# Patient Record
Sex: FEMALE | Race: WHITE | Employment: OTHER | URBAN - METROPOLITAN AREA
[De-identification: names, ages, dates, MRNs, and addresses within clinical notes are randomized per-mention and may not be internally consistent; named-entity substitution may affect disease eponyms.]

---

## 2017-01-17 ENCOUNTER — HOSPITAL ENCOUNTER (OUTPATIENT)
Dept: NON INVASIVE DIAGNOSTICS | Age: 77
Discharge: HOME | End: 2017-01-17
Attending: INTERNAL MEDICINE
Payer: MEDICARE

## 2017-01-17 DIAGNOSIS — C50.111 MALIGNANT NEOPLASM OF CENTRAL PORTION OF RIGHT FEMALE BREAST (HCC): ICD-10-CM

## 2017-01-17 PROCEDURE — 93306 TTE W/DOPPLER COMPLETE: CPT

## 2017-05-09 ENCOUNTER — HOSPITAL ENCOUNTER (OUTPATIENT)
Dept: NON INVASIVE DIAGNOSTICS | Age: 77
Discharge: HOME | End: 2017-05-09
Attending: INTERNAL MEDICINE
Payer: MEDICARE

## 2017-05-09 DIAGNOSIS — Z79.899 ENCOUNTER FOR LONG-TERM (CURRENT) USE OF OTHER MEDICATIONS: ICD-10-CM

## 2017-05-09 PROCEDURE — 93306 TTE W/DOPPLER COMPLETE: CPT

## 2017-08-22 ENCOUNTER — HOSPITAL ENCOUNTER (OUTPATIENT)
Dept: NON INVASIVE DIAGNOSTICS | Age: 77
Discharge: HOME | End: 2017-08-22
Attending: INTERNAL MEDICINE
Payer: MEDICARE

## 2017-08-22 DIAGNOSIS — Z79.899 ENCOUNTER FOR LONG-TERM (CURRENT) USE OF OTHER MEDICATIONS: ICD-10-CM

## 2017-08-22 PROCEDURE — 93306 TTE W/DOPPLER COMPLETE: CPT

## 2017-08-26 PROBLEM — Z78.0 POST-MENOPAUSAL: Status: ACTIVE | Noted: 2017-08-26

## 2017-08-26 PROBLEM — K11.20 SIALOADENITIS: Status: ACTIVE | Noted: 2017-08-26

## 2017-08-26 PROBLEM — K26.9 DUODENAL ULCER: Status: ACTIVE | Noted: 2017-08-26

## 2017-08-26 PROBLEM — N64.53 NIPPLE RETRACTION: Status: ACTIVE | Noted: 2017-08-26

## 2017-08-26 PROBLEM — R53.83 FATIGUE: Status: ACTIVE | Noted: 2017-08-26

## 2017-08-26 PROBLEM — E78.5 HYPERLIPIDEMIA, ACQUIRED: Status: ACTIVE | Noted: 2017-08-26

## 2017-08-26 PROBLEM — D64.9 ANEMIA: Status: ACTIVE | Noted: 2017-08-26

## 2017-08-26 PROBLEM — L03.90 CELLULITIS: Status: ACTIVE | Noted: 2017-08-26

## 2017-08-26 PROBLEM — Z96.659 HISTORY OF TOTAL KNEE REPLACEMENT: Status: ACTIVE | Noted: 2017-08-26

## 2017-08-26 PROBLEM — Z86.018 HISTORY OF UTERINE FIBROID: Status: ACTIVE | Noted: 2017-08-26

## 2017-08-26 PROBLEM — M79.2 NEURALGIA: Status: ACTIVE | Noted: 2017-08-26

## 2017-08-26 PROBLEM — C50.911 BREAST CANCER, RIGHT (HCC): Status: ACTIVE | Noted: 2017-08-26

## 2017-09-22 PROBLEM — I10 HTN (HYPERTENSION), BENIGN: Status: ACTIVE | Noted: 2017-09-22

## 2017-09-28 ENCOUNTER — OFFICE VISIT (OUTPATIENT)
Dept: INTERNAL MEDICINE CLINIC | Age: 77
End: 2017-09-28

## 2017-09-28 VITALS
HEIGHT: 64 IN | BODY MASS INDEX: 32.61 KG/M2 | SYSTOLIC BLOOD PRESSURE: 140 MMHG | WEIGHT: 191 LBS | DIASTOLIC BLOOD PRESSURE: 78 MMHG

## 2017-09-28 DIAGNOSIS — Z23 ENCOUNTER FOR IMMUNIZATION: ICD-10-CM

## 2017-09-28 DIAGNOSIS — Z00.00 MEDICARE ANNUAL WELLNESS VISIT, SUBSEQUENT: Primary | ICD-10-CM

## 2017-09-28 DIAGNOSIS — Z13.31 SCREENING FOR DEPRESSION: ICD-10-CM

## 2017-09-28 DIAGNOSIS — I10 ESSENTIAL HYPERTENSION: ICD-10-CM

## 2017-09-28 DIAGNOSIS — C78.00 CARCINOMA OF RIGHT BREAST METASTATIC TO LUNG (HCC): ICD-10-CM

## 2017-09-28 DIAGNOSIS — C50.911 CARCINOMA OF RIGHT BREAST METASTATIC TO LUNG (HCC): ICD-10-CM

## 2017-09-28 DIAGNOSIS — Z13.6 SCREENING FOR ISCHEMIC HEART DISEASE: ICD-10-CM

## 2017-09-28 LAB
ALBUMIN SERPL-MCNC: 4.1 G/DL (ref 3.9–5.4)
ALKALINE PHOS POC: 108 U/L (ref 38–126)
ALT SERPL-CCNC: 32 U/L (ref 9–52)
AST SERPL-CCNC: 27 U/L (ref 14–36)
BACTERIA UA POCT, BACTPOCT: NORMAL
BILIRUB UR QL STRIP: NEGATIVE
BUN BLD-MCNC: 17 MG/DL (ref 7–17)
CALCIUM BLD-MCNC: 9.4 MG/DL (ref 8.4–10.2)
CASTS UA POCT: 0
CHLORIDE BLD-SCNC: 104 MMOL/L (ref 98–107)
CHOLEST SERPL-MCNC: 207 MG/DL (ref 0–200)
CLUE CELLS, CLUEPOCT: NEGATIVE
CO2 POC: 29 MMOL/L (ref 22–32)
CREAT BLD-MCNC: 0.8 MG/DL (ref 0.7–1.2)
CRYSTALS UA POCT, CRYSPOCT: NEGATIVE
EGFR (POC): 71.6
EPITHELIAL CELLS POCT: NORMAL
GLUCOSE POC: 102 MG/DL (ref 65–105)
GLUCOSE UR-MCNC: NEGATIVE MG/DL
HDLC SERPL-MCNC: 67 MG/DL (ref 35–130)
KETONES P FAST UR STRIP-MCNC: NEGATIVE MG/DL
LDL CHOLESTEROL POC: 118.6 MG/DL (ref 0–130)
MUCUS UA POCT, MUCPOCT: NORMAL
PH UR STRIP: 6 [PH] (ref 5–7)
POTASSIUM SERPL-SCNC: 5 MMOL/L (ref 3.6–5)
PROT SERPL-MCNC: 7.2 G/DL (ref 6.3–8.2)
PROT UR QL STRIP: NEGATIVE MG/DL
RBC UA POCT, RBCPOCT: NORMAL
SODIUM SERPL-SCNC: 144 MMOL/L (ref 137–145)
SP GR UR STRIP: 1.01 (ref 1.01–1.02)
TCHOL/HDL RATIO (POC): 3.1 (ref 0–4)
TOTAL BILIRUBIN POC: 0.4 MG/DL (ref 0.2–1.3)
TRICH UA POCT, TRICHPOC: NEGATIVE
TRIGL SERPL-MCNC: 107 MG/DL (ref 0–200)
UA UROBILINOGEN AMB POC: NORMAL (ref 0.2–1)
URINALYSIS CLARITY POC: CLEAR
URINALYSIS COLOR POC: NORMAL
URINE BLOOD POC: NEGATIVE
URINE CULT COMMENT, POCT: NORMAL
URINE LEUKOCYTES POC: NEGATIVE
URINE NITRITES POC: NEGATIVE
VLDLC SERPL CALC-MCNC: 21.4 MG/DL
WBC UA POCT, WBCPOCT: NORMAL
YEAST UA POCT, YEASTPOC: NEGATIVE

## 2017-09-28 RX ORDER — ATENOLOL 25 MG/1
25 TABLET ORAL
Qty: 30 TAB | Refills: 1 | Status: SHIPPED | OUTPATIENT
Start: 2017-09-28 | End: 2017-11-16 | Stop reason: SDUPTHER

## 2017-09-28 NOTE — PROGRESS NOTES
This is a Subsequent Medicare Annual Wellness Exam (AWV) (Performed 12 months after IPPE or effective date of Medicare Part B enrollment, Once in a lifetime)    I have reviewed the patient's medical history in detail and updated the computerized patient record. History     Past Medical History:   Diagnosis Date    Breast cancer, right ([address] Dx 4/12/16    Dr Aston Lin    HTN (hypertension), benign 9/22/2017    Hypertension     Motion sickness     Nausea & vomiting       Past Surgical History:   Procedure Laterality Date    HX CATARACT REMOVAL Bilateral 1997 2005    HX ORTHOPAEDIC Left 2008    part knee replacement    UPPER GI ENDOSCOPY,BIOPSY  6/20/2016         UPPER GI ENDOSCOPY,CTRL BLEED  6/20/2016          Current Outpatient Prescriptions   Medication Sig Dispense Refill    atenolol (TENORMIN) 25 mg tablet Take 1 Tab by mouth daily (after dinner). 30 Tab 1    omeprazole (PRILOSEC) 40 mg capsule Take 1 Cap by mouth two (2) times a day. 60 Cap 2    multivitamin (ONE A DAY) tablet Take 1 Tab by mouth daily.  aspirin delayed-release 81 mg tablet Take  by mouth daily.        Allergies   Allergen Reactions    [place] Cough Drops [Menthol] Swelling     Mouth swelling, had to go to ER    Bee Sting [Sting, Bee] Swelling    Sulfa (Sulfonamide Antibiotics) Hives     Family History   Problem Relation Age of Onset    Heart Disease Mother     Hypertension Mother      Social History   Substance Use Topics    Smoking status: Never Smoker    Smokeless tobacco: Never Used    Alcohol use Yes      Comment: not weekly     Patient Active Problem List   Diagnosis Code    Inversion, nipple right N64.59    Carcinoma of right breast metastatic to lung (HCC) C50.911, C78.00    Acute upper GI bleed K92.2    Acute blood loss anemia D62    Nausea & vomiting R11.2    HTN (hypertension) I10    Pre-syncope R55    Hypovolemia E86.1    Anemia D64.9    Breast cancer, right (HCC) C50.911    Cellulitis L03.90  Duodenal ulcer K26.9    Fatigue R53.83    History of total knee replacement Z96.659    History of uterine fibroid Z86.018    Hyperlipidemia, acquired E78.5    Neuralgia M79.2    Nipple retraction N64.53    Post-menopausal Z78.0    Sialoadenitis K11.20    HTN (hypertension), benign I10       Depression Risk Factor Screening:     PHQ over the last two weeks 9/28/2017   Little interest or pleasure in doing things Not at all   Feeling down, depressed or hopeless Not at all   Total Score PHQ 2 0     Alcohol Risk Factor Screening: You do not drink alcohol or very rarely. Functional Ability and Level of Safety:   Hearing Loss  Hearing is good. Activities of Daily Living  The home contains: no safety equipment  Patient does total self care    Fall RiskFall Risk Assessment, last 12 mths 9/28/2017   Able to walk? Yes   Fall in past 12 months? No       Abuse Screen  Patient is not abused    Cognitive Screening   Evaluation of Cognitive Function:  Has your family/caregiver stated any concerns about your memory: no  Normal    Patient Care Team   Patient Care Team:  Kiara Pope MD as PCP - General (Internal Medicine)  Ethan Becerra MD as Surgeon (General Surgery)    Assessment/Plan   Education and counseling provided:  Are appropriate based on today's review and evaluation  Pneumococcal Vaccine  Influenza Vaccine  Prostate cancer screening tests (PSA, covered annually)  Colorectal cancer screening tests    Diagnoses and all orders for this visit:    1. Essential hypertension    2. Carcinoma of right breast metastatic to lung [facility])    Other orders  -     atenolol (TENORMIN) 25 mg tablet; Take 1 Tab by mouth daily (after dinner).         Health Maintenance Due   Topic Date Due    DTaP/Tdap/Td series (1 - Tdap) 07/02/1962    ZOSTER VACCINE AGE 60>  05/02/2001    GLAUCOMA SCREENING Q2Y  07/02/2006    OSTEOPOROSIS SCREENING (DEXA)  07/02/2006    Pneumococcal 65+ High/Highest Risk (1 of 2 - PCV13) 07/02/2006    MEDICARE YEARLY EXAM  07/02/2006    INFLUENZA AGE 9 TO ADULT  08/01/2017     This note will not be viewable in [employer]. Leona Rashid is a 69 y.o. female and presents with Blood Pressure Check ( rm 2 - 6 mo follow up - fasting - atenolol rx she wants printed out) and Cholesterol Problem  . Subjective:  Mrs. follow-up presents today for follow-up of hypertension. She is doing well on her current regimen of atenolol. She request a printed prescription for this for refill today. She denies any side effects from the medication. She denies chest pain shortness of breath PND orthopnea or pedal edema. Past Medical History:   Diagnosis Date    Breast cancer, right ([address] Dx 4/12/16    Dr Pat Alicea    HTN (hypertension), benign 9/22/2017    Hypertension     Motion sickness     Nausea & vomiting      Past Surgical History:   Procedure Laterality Date    HX CATARACT REMOVAL Bilateral 1997 2005    HX ORTHOPAEDIC Left 2008    part knee replacement    UPPER GI ENDOSCOPY,BIOPSY  6/20/2016         UPPER GI ENDOSCOPY,CTRL BLEED  6/20/2016          Allergies   Allergen Reactions    [place] Cough Drops [Menthol] Swelling     Mouth swelling, had to go to ER    Bee Sting [Sting, Bee] Swelling    Sulfa (Sulfonamide Antibiotics) Hives     Current Outpatient Prescriptions   Medication Sig Dispense Refill    atenolol (TENORMIN) 25 mg tablet Take 1 Tab by mouth daily (after dinner). 30 Tab 1    omeprazole (PRILOSEC) 40 mg capsule Take 1 Cap by mouth two (2) times a day. 60 Cap 2    multivitamin (ONE A DAY) tablet Take 1 Tab by mouth daily.  aspirin delayed-release 81 mg tablet Take  by mouth daily.        Social History     Social History    Marital status:      Spouse name: N/A    Number of children: N/A    Years of education: N/A     Social History Main Topics    Smoking status: Never Smoker    Smokeless tobacco: Never Used    Alcohol use Yes      Comment: not weekly    Drug use: No    Sexual activity: Not Asked     Other Topics Concern    None     Social History Narrative     Family History   Problem Relation Age of Onset    Heart Disease Mother     Hypertension Mother        Review of Systems  Constitutional:  negative for fevers, chills, anorexia and weight loss  Eyes:    negative for visual disturbance and irritation  ENT:    negative for tinnitus,sore throat,nasal congestion,ear pains. hoarseness  Respiratory:     negative for cough, hemoptysis, dyspnea,wheezing  CV:    negative for chest pain, palpitations, lower extremity edema  GI:    negative for nausea, vomiting, diarrhea, abdominal pain,melena  Endo:               negative for polyuria,polydipsia,polyphagia,heat intolerance  Genitourinary : negative for frequency, dysuria and hematuria  Integumentary: negative for rash and pruritus  Hematologic:   negative for easy bruising and gum/nose bleeding  Musculoskel:  negative for myalgias, arthralgias, back pain, muscle weakness, joint pain  Neurological:   negative for headaches, dizziness, vertigo, memory problems and gait   Behavl/Psych:  negative for feelings of anxiety, depression, mood changes  ROS otherwise negative      Objective:  Visit Vitals    /78 (BP 1 Location: Left arm, BP Patient Position: At rest)    Ht 5' 4\" (1.626 m)    Wt 191 lb (86.6 kg)    BMI 32.79 kg/m2     Physical Exam:   General appearance - alert, well appearing, and in no distress  Mental status - alert, oriented to person, place, and time  EYE-ENE, EOMI, fundi normal, corneas normal, no foreign bodies  ENT-ENT exam normal, no neck nodes or sinus tenderness  Nose - normal and patent, no erythema, discharge or polyps  Mouth - mucous membranes moist, pharynx normal without lesions  Neck - supple, no significant adenopathy   Chest - clear to auscultation, no wheezes, rales or rhonchi, symmetric air entry   Heart - normal rate, regular rhythm, normal S1, S2, no murmurs, rubs, clicks or gallops   Abdomen - soft, nontender, nondistended, no masses or organomegaly  Lymph- no adenopathy palpable  Ext-peripheral pulses normal, no pedal edema, no clubbing or cyanosis  Skin-Warm and dry. no hyperpigmentation, vitiligo, or suspicious lesions  Neuro -alert, oriented, normal speech, no focal findings or movement disorder noted      Assessment/Plan:  Diagnoses and all orders for this visit:    1. Medicare annual wellness visit, subsequent  -     atenolol (TENORMIN) 25 mg tablet; Take 1 Tab by mouth daily (after dinner). -     Depression Screen Annual  -     Influenza Admin ()  -     Influenza virus vaccine (QUADRIVALENT PF SYRINGE) ([id number])  -     AMB POC COMPREHENSIVE METABOLIC PANEL  -     AMB POC URINALYSIS DIP STICK AUTO W/ MICRO   -     AMB POC LIPID PROFILE    2. Essential hypertension  -     atenolol (TENORMIN) 25 mg tablet; Take 1 Tab by mouth daily (after dinner). -     Depression Screen Annual  -     Influenza Admin ()  -     Influenza virus vaccine (QUADRIVALENT PF SYRINGE) ([id number])  -     AMB POC COMPREHENSIVE METABOLIC PANEL  -     AMB POC URINALYSIS DIP STICK AUTO W/ MICRO   -     AMB POC LIPID PROFILE    3. Carcinoma of right breast metastatic to lung (HCC)  -     atenolol (TENORMIN) 25 mg tablet; Take 1 Tab by mouth daily (after dinner). -     Depression Screen Annual  -     Influenza Admin ()  -     Influenza virus vaccine (QUADRIVALENT PF SYRINGE) ([id number])  -     AMB POC COMPREHENSIVE METABOLIC PANEL  -     AMB POC URINALYSIS DIP STICK AUTO W/ MICRO   -     AMB POC LIPID PROFILE    4. Screening for depression  -     atenolol (TENORMIN) 25 mg tablet; Take 1 Tab by mouth daily (after dinner). -     Depression Screen Annual  -     Influenza Admin ()  -     Influenza virus vaccine (QUADRIVALENT PF SYRINGE) ([id number])  -     AMB POC COMPREHENSIVE METABOLIC PANEL  -     AMB POC URINALYSIS DIP STICK AUTO W/ MICRO   -     AMB POC LIPID PROFILE    5.  Screening for ischemic heart disease  -     atenolol (TENORMIN) 25 mg tablet; Take 1 Tab by mouth daily (after dinner). -     Depression Screen Annual  -     Influenza Admin ()  -     Influenza virus vaccine (QUADRIVALENT PF SYRINGE) ([id number])  -     AMB POC COMPREHENSIVE METABOLIC PANEL  -     AMB POC URINALYSIS DIP STICK AUTO W/ MICRO   -     AMB POC LIPID PROFILE    6. Encounter for immunization  -     atenolol (TENORMIN) 25 mg tablet; Take 1 Tab by mouth daily (after dinner). -     Depression Screen Annual  -     Influenza Admin ()  -     Influenza virus vaccine (QUADRIVALENT PF SYRINGE) ([id number])  -     AMB POC COMPREHENSIVE METABOLIC PANEL  -     AMB POC URINALYSIS DIP STICK AUTO W/ MICRO   -     AMB POC LIPID PROFILE          ICD-10-CM ICD-9-CM    1. Medicare annual wellness visit, subsequent Z00.00 V70.0 atenolol (TENORMIN) 25 mg tablet      DEPRESSION SCREEN ANNUAL      ADMIN INFLUENZA VIRUS VAC      INFLUENZA VIRUS VAC QUAD,SPLIT,PRESV FREE SYRINGE IM      AMB POC COMPREHENSIVE METABOLIC PANEL      AMB POC URINALYSIS DIP STICK AUTO W/ MICRO       AMB POC LIPID PROFILE   2. Essential hypertension I10 401.9 atenolol (TENORMIN) 25 mg tablet      DEPRESSION SCREEN ANNUAL      ADMIN INFLUENZA VIRUS VAC      INFLUENZA VIRUS VAC QUAD,SPLIT,PRESV FREE SYRINGE IM      AMB POC COMPREHENSIVE METABOLIC PANEL      AMB POC URINALYSIS DIP STICK AUTO W/ MICRO       AMB POC LIPID PROFILE   3. Carcinoma of right breast metastatic to lung (HCC) C50.911 174.9 atenolol (TENORMIN) 25 mg tablet    C78.00 197.0 DEPRESSION SCREEN ANNUAL      ADMIN INFLUENZA VIRUS VAC      INFLUENZA VIRUS VAC QUAD,SPLIT,PRESV FREE SYRINGE IM      AMB POC COMPREHENSIVE METABOLIC PANEL      AMB POC URINALYSIS DIP STICK AUTO W/ MICRO       AMB POC LIPID PROFILE   4.  Screening for depression Z13.89 V79.0 atenolol (TENORMIN) 25 mg tablet      DEPRESSION SCREEN ANNUAL      ADMIN INFLUENZA VIRUS VAC      INFLUENZA VIRUS VAC QUAD,SPLIT,PRESV FREE SYRINGE IM      AMB POC COMPREHENSIVE METABOLIC PANEL      AMB POC URINALYSIS DIP STICK AUTO W/ MICRO       AMB POC LIPID PROFILE   5. Screening for ischemic heart disease Z13.6 V81.0 atenolol (TENORMIN) 25 mg tablet      DEPRESSION SCREEN ANNUAL      ADMIN INFLUENZA VIRUS VAC      INFLUENZA VIRUS VAC QUAD,SPLIT,PRESV FREE SYRINGE IM      AMB POC COMPREHENSIVE METABOLIC PANEL      AMB POC URINALYSIS DIP STICK AUTO W/ MICRO       AMB POC LIPID PROFILE      CANCELED: LIPID PANEL   6. Encounter for immunization Z23 V03.89 atenolol (TENORMIN) 25 mg tablet      DEPRESSION SCREEN ANNUAL      ADMIN INFLUENZA VIRUS VAC      INFLUENZA VIRUS VAC QUAD,SPLIT,PRESV FREE SYRINGE IM      AMB POC COMPREHENSIVE METABOLIC PANEL      AMB POC URINALYSIS DIP STICK AUTO W/ MICRO       AMB POC LIPID PROFILE     Plan:    Blood pressure and cholesterol appear to be well-controlled further recommendations based on lab results. Continue current dose of atenolol as prescribed. Follow-up in one year unless otherwise indicated. Follow-up Disposition: Not on File    I have reviewed with the patient details of the assessment and plan and all questions were answered. Relevent patient education was performed. Verbal and/or written instructions (see AVS) provided. The most recent lab findings were reviewed with the patient. Plan was discussed with patient who verbally expressed understanding. An After Visit Summary was printed and given to the patient.     Dk Hebert MD

## 2017-09-28 NOTE — PROGRESS NOTES
Laine Velez is a 69 y.o. female presenting for Blood Pressure Check ( rm 2 - 6 mo follow up - atenolol rx she wants printed out) and Cholesterol Problem  . 1. Have you been to the ER, urgent care clinic since your last visit? Hospitalized since your last visit? No    2. Have you seen or consulted any other health care providers outside of the [address] [place] [address] since your last visit? Include any pap smears or colon screening.  Yes When: 1 week ago Where: Dr Queen Mancilla Reason for visit: Oncololgy

## 2017-09-28 NOTE — PATIENT INSTRUCTIONS

## 2017-09-28 NOTE — MR AVS SNAPSHOT
Visit Information Date & Time Provider Department Dept. Phone Encounter #  
 9/28/2017  8:40 AM OZIEL Drummond MD [address] [place] [address] [facility] [phone] [id number] Upcoming Health Maintenance Date Due DTaP/Tdap/Td series (1 - Tdap) 7/2/1962 ZOSTER VACCINE AGE 60> 5/2/2001 GLAUCOMA SCREENING Q2Y 7/2/2006 OSTEOPOROSIS SCREENING (DEXA) 7/2/2006 Pneumococcal 65+ High/Highest Risk (1 of 2 - PCV13) 7/2/2006 INFLUENZA AGE 9 TO ADULT 8/1/2017 MEDICARE YEARLY EXAM 9/29/2018 Allergies as of 9/28/2017  Review Complete On: 9/28/2017 By: Itzel Jordan MD  
  
 Severity Noted Reaction Type Reactions [place] Cough Drops [Menthol] High 04/27/2016    Swelling Mouth swelling, had to go to ER [place] Sting [Sting, Bee]  04/27/2016    Swelling Sulfa (Sulfonamide Antibiotics)  04/05/2016    Hives Current Immunizations  Reviewed on 6/19/2016 Name Date Influenza Vaccine 10/28/2016, 10/24/2014, 10/29/2013, 10/22/2012, 10/31/2011 Influenza Vaccine (Quad) PF  Incomplete Not reviewed this visit You Were Diagnosed With   
  
 Codes Comments Medicare annual wellness visit, subsequent    -  Primary ICD-10-CM: Z00.00 ICD-9-CM: V70.0 Essential hypertension     ICD-10-CM: I10 
ICD-9-CM: 401.9 Carcinoma of right breast metastatic to lung [facility])     ICD-10-CM: C50.911, C78.00 ICD-9-CM: 174.9, 197.0 Screening for depression     ICD-10-CM: Z13.89 ICD-9-CM: V79.0 Screening for ischemic heart disease     ICD-10-CM: Z13.6 ICD-9-CM: V81.0 Encounter for immunization     ICD-10-CM: G42 ICD-9-CM: V03.89 Vitals BP Height(growth percentile) Weight(growth percentile) BMI OB Status Smoking Status 140/78 (BP 1 Location: Left arm, BP Patient Position: At rest) 5' 4\" (1.626 m) 191 lb (86.6 kg) 32.79 kg/m2 Postmenopausal Never Smoker Vitals History BMI and BSA Data Body Mass Index Body Surface Area 32.79 kg/m 2 1.98 m 2 Preferred Pharmacy Pharmacy Name Phone [facility] [address], [address] [place] [address] Avenue [phone] Your Updated Medication List  
  
   
This list is accurate as of: 9/28/17 10:03 AM.  Always use your most recent med list.  
  
  
  
  
 aspirin delayed-release 81 mg tablet Take  by mouth daily. atenolol 25 mg tablet Commonly known as:  TENORMIN Take 1 Tab by mouth daily (after dinner). multivitamin tablet Commonly known as:  ONE A DAY Take 1 Tab by mouth daily. omeprazole 40 mg capsule Commonly known as:  PRILOSEC Take 1 Cap by mouth two (2) times a day. Prescriptions Printed Refills  
 atenolol (TENORMIN) 25 mg tablet 1 Sig: Take 1 Tab by mouth daily (after dinner). Class: Print Route: Oral  
  
We Performed the Following ADMIN INFLUENZA VIRUS VAC [ [facility]] AMB POC COMPREHENSIVE METABOLIC PANEL [91649 CPT(R)] AMB POC LIPID PROFILE [44302 CPT(R)] AMB POC URINALYSIS DIP STICK AUTO W/ MICRO  [27651 CPT(R)] [address] [[id number] [facility]] INFLUENZA VIRUS VAC QUAD,SPLIT,PRESV FREE SYRINGE IM [id number] CPT(R)] Patient Instructions Medicare Wellness Visit, Female The best way to live healthy is to have a healthy lifestyle by eating a well-balanced diet, exercising regularly, limiting alcohol and stopping smoking. Regular physical exams and screening tests are another way to keep healthy. Preventive exams provided by your health care provider can find health problems before they become diseases or illnesses. Preventive services including immunizations, screening tests, monitoring and exams can help you take care of your own health. All people over age 72 should have a pneumovax  and and a prevnar shot to prevent pneumonia. These are once in a lifetime unless you and your provider decide differently. All people over 65 should have a yearly flu shot and a tetanus vaccine every 10 years. A bone mass density to screen for osteoporosis or thinning of the bones should be done every 2 years after 65. Screening for diabetes mellitus with a blood sugar test should be done every year. Glaucoma is a disease of the eye due to increased ocular pressure that can lead to blindness and it should be done every year by an eye professional. 
 
Cardiovascular screening tests that check for elevated lipids (fatty part of blood) which can lead to heart disease and strokes should be done every 5 years. Colorectal screening that evaluates for blood or polyps in your colon should be done yearly as a stool test or every five years as a flexible sigmoidoscope or every 10 years as a colonoscopy up to age 76. Breast cancer screening with a mammogram is recommended biennially  for women age 54-69. Screening for cervical cancer with a pap smear and pelvic exam is recommended for women after age 72 years every 2 years up to age 79 or when the provider and patient decide to stop. If there is a history of cervical abnormalities or other increased risk for cancer then the test is recommended yearly. Hepatitis C screening is also recommended for anyone born between 80 through [address]. A shingles vaccine is also recommended once in a lifetime after age 61. Your Medicare Wellness Exam is recommended annually. Here is a list of your current Health Maintenance items with a due date: 
Health Maintenance Due Topic Date Due  
 DTaP/Tdap/Td  (1 - Tdap) 07/02/1962  Shingles Vaccine  05/02/2001  Glaucoma Screening   07/02/2006  Bone Density Screening  07/02/2006  Pneumococcal Vaccine (1 of 2 - PCV13) 07/02/2006 Katie Carmona Annual Well Visit  07/02/2006  Flu Vaccine  08/01/2017 Introducing \[facility] [place] [facility]\"" & HEALTH SERVICES!    
 Rufino Maldonado introduces [employer] patient portal. Now you can access parts of your medical record, email your doctor's office, and request medication refills online. 1. In your internet browser, go to [email or web address] 2. Click on the First Time User? Click Here link in the Sign In box. You will see the New Member Sign Up page. 3. Enter your [employer] Access Code exactly as it appears below. You will not need to use this code after youve completed the sign-up process. If you do not sign up before the expiration date, you must request a new code. · [employer] Access Code: [email or web address] Expires: 11/19/2017  5:11 PM 
 
4. Enter the last four digits of your Social Security Number (xxxx) and Date of Birth (mm/dd/yyyy) as indicated and click Submit. You will be taken to the next sign-up page. 5. Create a [employer] ID. This will be your [employer] login ID and cannot be changed, so think of one that is secure and easy to remember. 6. Create a [employer] password. You can change your password at any time. 7. Enter your Password Reset Question and Answer. This can be used at a later time if you forget your password. 8. Enter your e-mail address. You will receive e-mail notification when new information is available in [address] 9. Click Sign Up. You can now view and download portions of your medical record. 10. Click the Download Summary menu link to download a portable copy of your medical information. If you have questions, please visit the Frequently Asked Questions section of the [employer] website. Remember, [employer] is NOT to be used for urgent needs. For medical emergencies, dial 911. Now available from your iPhone and Android! Please provide this summary of care documentation to your next provider. Your primary care clinician is listed as OZIEL Billy. If you have any questions after today's visit, please call [phone].

## 2017-10-02 NOTE — PROGRESS NOTES
Glucose is normal.  Liver and kidney function are normal.  Urinalysis was normal.  Total cholesterol is mildly elevated at 207. However HDL cholesterol is excellent at 67 and LDL cholesterol is excellent at 118. Labs look great. Follow-up as planned.

## 2017-11-16 DIAGNOSIS — Z23 ENCOUNTER FOR IMMUNIZATION: ICD-10-CM

## 2017-11-16 DIAGNOSIS — Z00.00 MEDICARE ANNUAL WELLNESS VISIT, SUBSEQUENT: ICD-10-CM

## 2017-11-16 DIAGNOSIS — C50.911 CARCINOMA OF RIGHT BREAST METASTATIC TO LUNG (HCC): ICD-10-CM

## 2017-11-16 DIAGNOSIS — Z13.31 SCREENING FOR DEPRESSION: ICD-10-CM

## 2017-11-16 DIAGNOSIS — Z13.6 SCREENING FOR ISCHEMIC HEART DISEASE: ICD-10-CM

## 2017-11-16 DIAGNOSIS — C78.00 CARCINOMA OF RIGHT BREAST METASTATIC TO LUNG (HCC): ICD-10-CM

## 2017-11-16 DIAGNOSIS — I10 ESSENTIAL HYPERTENSION: ICD-10-CM

## 2017-11-17 RX ORDER — ATENOLOL 25 MG/1
TABLET ORAL
Qty: 90 TAB | Refills: 3 | Status: SHIPPED | OUTPATIENT
Start: 2017-11-17 | End: 2018-10-08 | Stop reason: SDUPTHER

## 2017-11-28 ENCOUNTER — HOSPITAL ENCOUNTER (OUTPATIENT)
Dept: NON INVASIVE DIAGNOSTICS | Age: 77
Discharge: HOME | End: 2017-11-28
Attending: INTERNAL MEDICINE
Payer: MEDICARE

## 2017-11-28 DIAGNOSIS — R19.7 DIARRHEA: ICD-10-CM

## 2017-11-28 DIAGNOSIS — T45.1X5A ANEMIA DUE TO CHEMOTHERAPY: ICD-10-CM

## 2017-11-28 DIAGNOSIS — R11.2 NAUSEA WITH VOMITING: ICD-10-CM

## 2017-11-28 DIAGNOSIS — Z08 VAGINAL PAP SMEAR FOLLOWING HYSTERECTOMY FOR MALIGNANCY: ICD-10-CM

## 2017-11-28 DIAGNOSIS — D64.81 ANEMIA DUE TO CHEMOTHERAPY: ICD-10-CM

## 2017-11-28 DIAGNOSIS — Z90.710 VAGINAL PAP SMEAR FOLLOWING HYSTERECTOMY FOR MALIGNANCY: ICD-10-CM

## 2017-11-28 DIAGNOSIS — Z79.899 NEED FOR PROPHYLACTIC CHEMOTHERAPY: ICD-10-CM

## 2017-11-28 DIAGNOSIS — C50.111 MALIGNANT NEOPLASM OF CENTRAL PORTION OF RIGHT FEMALE BREAST (HCC): ICD-10-CM

## 2017-11-28 PROCEDURE — 93306 TTE W/DOPPLER COMPLETE: CPT

## 2018-03-02 ENCOUNTER — HOSPITAL ENCOUNTER (OUTPATIENT)
Dept: NON INVASIVE DIAGNOSTICS | Age: 78
Discharge: HOME | End: 2018-03-02
Attending: INTERNAL MEDICINE
Payer: MEDICARE

## 2018-03-02 DIAGNOSIS — Z90.710 VAGINAL PAP SMEAR FOLLOWING HYSTERECTOMY FOR MALIGNANCY: ICD-10-CM

## 2018-03-02 DIAGNOSIS — D64.81 ANEMIA DUE TO CHEMOTHERAPY: ICD-10-CM

## 2018-03-02 DIAGNOSIS — T45.1X5A ANEMIA DUE TO CHEMOTHERAPY: ICD-10-CM

## 2018-03-02 DIAGNOSIS — R19.7 DIARRHEA: ICD-10-CM

## 2018-03-02 DIAGNOSIS — Z08 VAGINAL PAP SMEAR FOLLOWING HYSTERECTOMY FOR MALIGNANCY: ICD-10-CM

## 2018-03-02 DIAGNOSIS — C50.111 MALIGNANT NEOPLASM OF CENTRAL PORTION OF RIGHT FEMALE BREAST (HCC): ICD-10-CM

## 2018-03-02 DIAGNOSIS — Z79.899 NEED FOR PROPHYLACTIC CHEMOTHERAPY: ICD-10-CM

## 2018-03-02 DIAGNOSIS — R11.2 NAUSEA WITH VOMITING: ICD-10-CM

## 2018-03-02 PROCEDURE — 93306 TTE W/DOPPLER COMPLETE: CPT

## 2018-06-12 ENCOUNTER — HOSPITAL ENCOUNTER (OUTPATIENT)
Dept: NON INVASIVE DIAGNOSTICS | Age: 78
Discharge: HOME | End: 2018-06-12
Attending: INTERNAL MEDICINE
Payer: MEDICARE

## 2018-06-12 DIAGNOSIS — Z90.710 VAGINAL PAP SMEAR FOLLOWING HYSTERECTOMY FOR MALIGNANCY: ICD-10-CM

## 2018-06-12 DIAGNOSIS — Z79.899 NEED FOR PROPHYLACTIC CHEMOTHERAPY: ICD-10-CM

## 2018-06-12 DIAGNOSIS — C50.111 MALIGNANT NEOPLASM OF CENTRAL PORTION OF RIGHT FEMALE BREAST (HCC): ICD-10-CM

## 2018-06-12 DIAGNOSIS — R11.2 NAUSEA WITH VOMITING: ICD-10-CM

## 2018-06-12 DIAGNOSIS — Z08 VAGINAL PAP SMEAR FOLLOWING HYSTERECTOMY FOR MALIGNANCY: ICD-10-CM

## 2018-06-12 DIAGNOSIS — D64.81 ANEMIA DUE TO CHEMOTHERAPY: ICD-10-CM

## 2018-06-12 DIAGNOSIS — R19.7 DIARRHEA: ICD-10-CM

## 2018-06-12 DIAGNOSIS — T45.1X5A ANEMIA DUE TO CHEMOTHERAPY: ICD-10-CM

## 2018-06-12 PROCEDURE — 93306 TTE W/DOPPLER COMPLETE: CPT

## 2018-08-24 RX ORDER — OMEPRAZOLE 40 MG/1
40 CAPSULE, DELAYED RELEASE ORAL 2 TIMES DAILY
Qty: 60 CAP | Refills: 2 | Status: SHIPPED | OUTPATIENT
Start: 2018-08-24 | End: 2018-10-01 | Stop reason: ALTCHOICE

## 2018-08-24 RX ORDER — OMEPRAZOLE 40 MG/1
CAPSULE, DELAYED RELEASE ORAL
Qty: 60 CAP | Refills: 6 | Status: SHIPPED | OUTPATIENT
Start: 2018-08-24 | End: 2021-12-07

## 2018-09-25 ENCOUNTER — HOSPITAL ENCOUNTER (OUTPATIENT)
Dept: NON INVASIVE DIAGNOSTICS | Age: 78
Discharge: HOME | End: 2018-09-25
Attending: INTERNAL MEDICINE
Payer: MEDICARE

## 2018-09-25 DIAGNOSIS — C50.111 MALIGNANT NEOPLASM OF CENTRAL PORTION OF RIGHT FEMALE BREAST (HCC): ICD-10-CM

## 2018-09-25 DIAGNOSIS — Z08 ENCOUNTER FOR FOLLOW-UP EXAMINATION AFTER TREATMENT FOR MALIGNANT NEOPLASM: ICD-10-CM

## 2018-09-25 DIAGNOSIS — Z79.899 OTHER LONG TERM (CURRENT) DRUG THERAPY: ICD-10-CM

## 2018-09-25 PROCEDURE — 93306 TTE W/DOPPLER COMPLETE: CPT

## 2018-10-01 ENCOUNTER — OFFICE VISIT (OUTPATIENT)
Dept: INTERNAL MEDICINE CLINIC | Age: 78
End: 2018-10-01

## 2018-10-01 VITALS
WEIGHT: 187 LBS | BODY MASS INDEX: 31.92 KG/M2 | TEMPERATURE: 98.6 F | OXYGEN SATURATION: 96 % | HEIGHT: 64 IN | RESPIRATION RATE: 16 BRPM | DIASTOLIC BLOOD PRESSURE: 83 MMHG | HEART RATE: 69 BPM | SYSTOLIC BLOOD PRESSURE: 172 MMHG

## 2018-10-01 DIAGNOSIS — C78.00 CARCINOMA OF RIGHT BREAST METASTATIC TO LUNG (HCC): ICD-10-CM

## 2018-10-01 DIAGNOSIS — C50.911 CARCINOMA OF RIGHT BREAST METASTATIC TO LUNG (HCC): ICD-10-CM

## 2018-10-01 DIAGNOSIS — I10 ESSENTIAL HYPERTENSION: ICD-10-CM

## 2018-10-01 DIAGNOSIS — Z23 ENCOUNTER FOR IMMUNIZATION: ICD-10-CM

## 2018-10-01 DIAGNOSIS — Z00.00 MEDICARE ANNUAL WELLNESS VISIT, SUBSEQUENT: Primary | ICD-10-CM

## 2018-10-01 DIAGNOSIS — Z13.31 SCREENING FOR DEPRESSION: ICD-10-CM

## 2018-10-01 NOTE — PROGRESS NOTES
Chief Complaint   Patient presents with   [facility] Annual Wellness Visit     room 2       Depression Risk Factor Screening:     PHQ over the last two weeks 10/1/2018   Little interest or pleasure in doing things Not at all   Feeling down, depressed, irritable, or hopeless Not at all   Total Score PHQ 2 0       Functional Ability and Level of Safety:     Activities of Daily Living  ADL Assessment 10/1/2018   Feeding yourself No Help Needed   Getting from bed to chair No Help Needed   Getting dressed No Help Needed   Bathing or showering No Help Needed   Walk across the room (includes cane/walker) No Help Needed   Using the telphone No Help Needed   Taking your medications No Help Needed   Preparing meals No Help Needed   Managing money (expenses/bills) No Help Needed   Moderately strenuous housework (laundry) No Help Needed   Shopping for personal items (toiletries/medicines) No Help Needed   Shopping for groceries No Help Needed   Driving No Help Needed   Climbing a flight of stairs No Help Needed   Getting to places beyond walking distances No Help Needed       Fall Risk  Fall Risk Assessment, last 12 mths 10/1/2018   Able to walk? Yes   Fall in past 12 months? No       Abuse Screen  Abuse Screening Questionnaire 10/1/2018   Do you ever feel afraid of your partner? N   Are you in a relationship with someone who physically or mentally threatens you? N   Is it safe for you to go home? Y         Patient Care Team   Patient Care Team:  Winnie Black MD as PCP - General (Internal Medicine)  Caitlin Donato MD as Physician (Hematology and Oncology)      1. Have you been to the ER, urgent care clinic since your last visit? NO Hospitalized since your last visit? NO  2. Have you seen or consulted any other health care providers outside of the [address] [place] [address] since your last visit? Include any pap smears or colon screening. YES, DR. MÁRQUEZ (ONCOLOGY)        PT IS HERE FOR AWV, PT IS FASTING.        PAP/PELVIC: PT STATES SHE NO LONGER GETS THIS DONE. MAMMO: 2016, PT HAS HX OF BREAST CANCER  DEXA:  2016? COLONOSCOPY: NEVER    Silvia Interiano is a 69 y.o. female who presents for routine immunizations. She denies any symptoms , reactions or allergies that would exclude them from being immunized today. Risks and adverse reactions were discussed and the VIS was given to them. All questions were addressed. She was observed for 15 min post injection. There were no reactions observed. Liz Singh LPN      PER PT SHE REQUESTED REG FLU SHOT VS. HIGH DOSE. REVIEWED WITH IHSAN LAGUNA TO ADMINISTER REG DOSE FLU SHOT.

## 2018-10-01 NOTE — MR AVS SNAPSHOT
[address] [facility] [address] [id number] [phone] Patient: Dontae Cartagena MRN: [id number] YQB:[birth year] Visit Information Date & Time Provider Department Dept. Phone Encounter #  
 10/1/2018  9:10 AM OZIEL Starr MD [facility] [phone] [id number] Follow-up Instructions Return in about 6 months (around 4/1/2019) for follow up. Follow-up and Disposition History Your Appointments 10/14/2019  8:50 AM  
Complete Physical with OZIEL Starr MD  
[facility] ([facility]) Appt Note: [facility] [address] [id number] [address] [facility] [phone] Upcoming Health Maintenance Date Due DTaP/Tdap/Td series (1 - Tdap) 7/2/1962 Shingrix Vaccine Age 50> (1 of 2) 7/2/1991 GLAUCOMA SCREENING Q2Y 7/2/2006 Bone Densitometry (Dexa) Screening 7/2/2006 Pneumococcal 65+ High/Highest Risk (1 of 2 - PCV13) 7/2/2006 Influenza Age 5 to Adult 8/1/2018 MEDICARE YEARLY EXAM 9/29/2018 Allergies as of 10/1/2018  Review Complete On: 10/1/2018 By: Candie White MD  
  
 Severity Noted Reaction Type Reactions Halls Cough Drops [Menthol] High 04/27/2016    Swelling Mouth swelling, had to go to ER Bee Sting [Sting, Bee]  04/27/2016    Swelling Sulfa (Sulfonamide Antibiotics)  04/05/2016    Hives Current Immunizations  Reviewed on 6/19/2016 Name Date Influenza Vaccine 10/28/2016, 10/24/2014, 10/29/2013, 10/22/2012, 10/31/2011 Influenza Vaccine (Quad) PF 10/1/2018, 9/28/2017 Tdap 8/24/2018 12:00 AM  
  
 Not reviewed this visit You Were Diagnosed With   
  
 Codes Comments Medicare annual wellness visit, subsequent    -  Primary ICD-10-CM: Z00.00 ICD-9-CM: V70.0 Encounter for immunization     ICD-10-CM: Y39 ICD-9-CM: V03.89   
 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 
ICD-9-CM: 401.9 Screening for depression     ICD-10-CM: Z13.31 
ICD-9-CM: V79.0 Vitals BP Pulse Temp Resp Height(growth percentile) Weight(growth percentile) 172/83 (BP 1 Location: Left arm, BP Patient Position: Sitting) 69 98.6 °F (37 °C) (Oral) 16 5' 4\" (1.626 m) 187 lb (84.8 kg) SpO2 BMI OB Status Smoking Status 96% 32.1 kg/m2 Postmenopausal Never Smoker Vitals History BMI and BSA Data Body Mass Index Body Surface Area  
 32.1 kg/m 2 1.96 m 2 Preferred Pharmacy Pharmacy Name Phone [facility] [address], [address] [place] [address] Avenue [phone] Your Updated Medication List  
  
   
This list is accurate as of 10/1/18 10:33 AM.  Always use your most recent med list.  
  
  
  
  
 aspirin delayed-release 81 mg tablet Take  by mouth daily. atenolol 25 mg tablet Commonly known as:  TENORMIN  
TAKE 1 TABLET EVERY DAY  
  
 multivitamin tablet Commonly known as:  ONE A DAY Take 1 Tab by mouth daily. omeprazole 40 mg capsule Commonly known as:  PRILOSEC  
TAKE ONE CAPSULE BY MOUTH TWICE DAILY We Performed the Following Lee 69 [[id number] HCP] INFLUENZA VIRUS VAC QUAD,SPLIT,PRESV FREE SYRINGE IM [id number] CPT(R)] Follow-up Instructions Return in about 6 months (around 4/1/2019) for follow up. Patient Instructions Medicare Wellness Visit, Female The best way to live healthy is to have a lifestyle where you eat a well-balanced diet, exercise regularly, limit alcohol use, and quit all forms of tobacco/nicotine, if applicable. Regular preventive services are another way to keep healthy.  Preventive services (vaccines, screening tests, monitoring & exams) can help personalize your care plan, which helps you manage your own care. Screening tests can find health problems at the earliest stages, when they are easiest to treat. [facility] follows the current, evidence-based guidelines published by the [facility] Cornelio Cali ([facility]) when recommending preventive services for our patients. Because we follow these guidelines, sometimes recommendations change over time as research supports it. (For example, mammograms used to be recommended annually. Even though Medicare will still pay for an annual mammogram, the newer guidelines recommend a mammogram every two years for women of average risk.) Of course, you and your doctor may decide to screen more often for some diseases, based on your risk and your health status. Preventive services for you include: - Medicare offers their members a free annual wellness visit, which is time for you and your primary care provider to discuss and plan for your preventive service needs. Take advantage of this benefit every year! 
-All adults over the age of 72 should receive the recommended pneumonia vaccines. Current USPSTF guidelines recommend a series of two vaccines for the best pneumonia protection.  
-All adults should have a flu vaccine yearly and a tetanus vaccine every 10 years. All adults age 61 and older should receive a shingles vaccine once in their lifetime.   
-A bone mass density test is recommended when a woman turns 65 to screen for osteoporosis. This test is only recommended one time, as a screening. Some providers will use this same test as a disease monitoring tool if you already have osteoporosis.  
-All adults age 38-68 who are overweight should have a diabetes screening test once every three years.  
-Other screening tests and preventive services for persons with diabetes include: an eye exam to screen for diabetic retinopathy, a kidney function test, a foot exam, and stricter control over your cholesterol.  
-Cardiovascular screening for adults with routine risk involves an electrocardiogram (ECG) at intervals determined by your doctor.  
-Colorectal cancer screenings should be done for adults age 54-65 with no increased risk factors for colorectal cancer. There are a number of acceptable methods of screening for this type of cancer. Each test has its own benefits and drawbacks. Discuss with your doctor what is most appropriate for you during your annual wellness visit. The different tests include: colonoscopy (considered the best screening method), a fecal occult blood test, a fecal DNA test, and sigmoidoscopy. -Breast cancer screenings are recommended every other year for women of normal risk, age 54-69. 
-Cervical cancer screenings for women over age 72 are only recommended with certain risk factors.  
-All adults born between [facility] should be screened once for Hepatitis C. Here is a list of your current Health Maintenance items (your personalized list of preventive services) with a due date: 
Health Maintenance Due Topic Date Due  
 DTaP/Tdap/Td  (1 - Tdap) 07/02/1962  Shingles Vaccine (1 of 2) 07/02/1991  Glaucoma Screening   07/02/2006  Bone Mineral Density   07/02/2006  Pneumococcal Vaccine (1 of 2 - PCV13) 07/02/2006  Flu Vaccine  08/01/2018 [facility] Annual Well Visit  09/29/2018 Medicare Wellness Visit, Female The best way to live healthy is to have a lifestyle where you eat a well-balanced diet, exercise regularly, limit alcohol use, and quit all forms of tobacco/nicotine, if applicable. Regular preventive services are another way to keep healthy. Preventive services (vaccines, screening tests, monitoring & exams) can help personalize your care plan, which helps you manage your own care. Screening tests can find health problems at the earliest stages, when they are easiest to treat. [facility] follows the current, evidence-based guidelines published by the [facility] States Cornelio Viraj ([facility]) when recommending preventive services for our patients. Because we follow these guidelines, sometimes recommendations change over time as research supports it. (For example, mammograms used to be recommended annually. Even though Medicare will still pay for an annual mammogram, the newer guidelines recommend a mammogram every two years for women of average risk.) Of course, you and your doctor may decide to screen more often for some diseases, based on your risk and your health status. Preventive services for you include: - Medicare offers their members a free annual wellness visit, which is time for you and your primary care provider to discuss and plan for your preventive service needs. Take advantage of this benefit every year! 
-All adults over the age of 72 should receive the recommended pneumonia vaccines. Current USPSTF guidelines recommend a series of two vaccines for the best pneumonia protection.  
-All adults should have a flu vaccine yearly and a tetanus vaccine every 10 years. All adults age 61 and older should receive a shingles vaccine once in their lifetime.   
-A bone mass density test is recommended when a woman turns 65 to screen for osteoporosis. This test is only recommended one time, as a screening. Some providers will use this same test as a disease monitoring tool if you already have osteoporosis. -All adults age 38-68 who are overweight should have a diabetes screening test once every three years.  
-Other screening tests and preventive services for persons with diabetes include: an eye exam to screen for diabetic retinopathy, a kidney function test, a foot exam, and stricter control over your cholesterol.  
-Cardiovascular screening for adults with routine risk involves an electrocardiogram (ECG) at intervals determined by your doctor. -Colorectal cancer screenings should be done for adults age 54-65 with no increased risk factors for colorectal cancer. There are a number of acceptable methods of screening for this type of cancer. Each test has its own benefits and drawbacks. Discuss with your doctor what is most appropriate for you during your annual wellness visit. The different tests include: colonoscopy (considered the best screening method), a fecal occult blood test, a fecal DNA test, and sigmoidoscopy. -Breast cancer screenings are recommended every other year for women of normal risk, age 54-69. 
-Cervical cancer screenings for women over age 72 are only recommended with certain risk factors.  
-All adults born between [facility] should be screened once for Hepatitis C. Here is a list of your current Health Maintenance items (your personalized list of preventive services) with a due date: 
Health Maintenance Due Topic Date Due  
 DTaP/Tdap/Td  (1 - Tdap) 07/02/1962  Shingles Vaccine (1 of 2) 07/02/1991  Glaucoma Screening   07/02/2006  Bone Mineral Density   07/02/2006  Pneumococcal Vaccine (1 of 2 - PCV13) 07/02/2006  Flu Vaccine  08/01/2018 [facility] Annual Well Visit  09/29/2018 All Medicare beneficiaries aged 48 and older are covered; however, when a beneficiary is at high risk, there is no minimum age required to receive a screening colonoscopy or a barium enema rendered as an alternative to a screening colonoscopy. The following are the coverage criteria for each colorectal cancer screening test/procedure. Screening FOBT Medicare provides coverage of a screening FOBT annually (i.e., at least 11 months have passed following the month in which the last covered screening FOBT was performed) for beneficiaries aged 48 and older. This screening requires a written order from the beneficiarys attending physician.  
 
NOTE: Medicare will only provide coverage for one FOBT per year: either HCPCS code  or CPT code [id number], but not both. Screening Colonoscopy For Beneficiaries at [address] for Developing Colorectal Cancer Medicare provides coverage of a screening colonoscopy (HCPCS code ) once every 2 years for beneficiaries at high risk for developing colorectal cancer (i.e., at least 23 months have passed following the month in which the last covered screening colonoscopy [HCPCS code ] was performed). For Beneficiaries Not at [address] for Developing Colorectal Cancer Medicare provides coverage of a screening colonoscopy (HCPCS code ) for beneficiaries who do not meet the criteria for being at high risk for developing colorectal cancer once every 10 years (i.e., at least 119 months have passed following the month in which the last covered screening colonoscopy [HCPCS code ] was performed). If the beneficiary otherwise qualifies to have a covered screening colonoscopy (HCPCS code ) based on the above but has had a covered screening flexible sigmoidoscopy (HCPCS code ), then Medicare may cover a screening colonoscopy (HCPCS code ) only after at least 47 months have passed following the month in which the last covered screening flexible sigmoidoscopy (HCPCS code ) was performed. Patient Instructions History Introducing [facility]! [employer] introduces [employer] patient portal. Now you can access parts of your medical record, email your doctor's office, and request medication refills online. 1. In your internet browser, go to [email or web address] and [email or web address] 2. Click on the First Time User? Click Here link in the Sign In box. You will see the New Member Sign Up page. 3. Enter your [employer] Access Code exactly as it appears below. You will not need to use this code after youve completed the sign-up process. If you do not sign up before the expiration date, you must request a new code. · [employer] Access Code: [email or web address] Expires: 12/23/2018  3:43 PM 
 
4. Enter the last four digits of your Social Security Number (xxxx) and Date of Birth (mm/dd/yyyy) as indicated and click Submit. You will be taken to the next sign-up page. 5. Create a [employer] ID. This will be your [employer] login ID and cannot be changed, so think of one that is secure and easy to remember. 6. Create a [employer] password. You can change your password at any time. 7. Enter your Password Reset Question and Answer. This can be used at a later time if you forget your password. 8. Enter your e-mail address. You will receive e-mail notification when new information is available in [address] 9. Click Sign Up. You can now view and download portions of your medical record. 10. Click the Download Summary menu link to download a portable copy of your medical information. If you have questions, please visit the Frequently Asked Questions section of the [employer] website. Remember, [employer] is NOT to be used for urgent needs. For medical emergencies, dial 911. Now available from your iPhone and Android! Please provide this summary of care documentation to your next provider. Your primary care clinician is listed as OZIEL Muhammad. If you have any questions after today's visit, please call [phone].

## 2018-10-01 NOTE — PATIENT INSTRUCTIONS
Medicare Wellness Visit, Female     The best way to live healthy is to have a lifestyle where you eat a well-balanced diet, exercise regularly, limit alcohol use, and quit all forms of tobacco/nicotine, if applicable. Regular preventive services are another way to keep healthy. Preventive services (vaccines, screening tests, monitoring & exams) can help personalize your care plan, which helps you manage your own care. Screening tests can find health problems at the earliest stages, when they are easiest to treat. [facility] follows the current, evidence-based guidelines published by the [facility] Cornelio Viraj ([facility]) when recommending preventive services for our patients. Because we follow these guidelines, sometimes recommendations change over time as research supports it. (For example, mammograms used to be recommended annually. Even though Medicare will still pay for an annual mammogram, the newer guidelines recommend a mammogram every two years for women of average risk.)  Of course, you and your doctor may decide to screen more often for some diseases, based on your risk and your health status. Preventive services for you include:  - Medicare offers their members a free annual wellness visit, which is time for you and your primary care provider to discuss and plan for your preventive service needs. Take advantage of this benefit every year!  -All adults over the age of 72 should receive the recommended pneumonia vaccines. Current USPSTF guidelines recommend a series of two vaccines for the best pneumonia protection.   -All adults should have a flu vaccine yearly and a tetanus vaccine every 10 years. All adults age 61 and older should receive a shingles vaccine once in their lifetime.    -A bone mass density test is recommended when a woman turns 65 to screen for osteoporosis. This test is only recommended one time, as a screening.  Some providers will use this same test as a disease monitoring tool if you already have osteoporosis. -All adults age 38-68 who are overweight should have a diabetes screening test once every three years.   -Other screening tests and preventive services for persons with diabetes include: an eye exam to screen for diabetic retinopathy, a kidney function test, a foot exam, and stricter control over your cholesterol.   -Cardiovascular screening for adults with routine risk involves an electrocardiogram (ECG) at intervals determined by your doctor.   -Colorectal cancer screenings should be done for adults age 54-65 with no increased risk factors for colorectal cancer. There are a number of acceptable methods of screening for this type of cancer. Each test has its own benefits and drawbacks. Discuss with your doctor what is most appropriate for you during your annual wellness visit. The different tests include: colonoscopy (considered the best screening method), a fecal occult blood test, a fecal DNA test, and sigmoidoscopy. -Breast cancer screenings are recommended every other year for women of normal risk, age 54-69.  -Cervical cancer screenings for women over age 72 are only recommended with certain risk factors.   -All adults born between [facility] should be screened once for Hepatitis C. Here is a list of your current Health Maintenance items (your personalized list of preventive services) with a due date:  Health Maintenance Due   Topic Date Due    DTaP/Tdap/Td  (1 - Tdap) 07/02/1962    Shingles Vaccine (1 of 2) 07/02/1991    Glaucoma Screening   07/02/2006    Bone Mineral Density   07/02/2006    Pneumococcal Vaccine (1 of 2 - PCV13) 07/02/2006    Flu Vaccine  08/01/2018    Annual Well Visit  09/29/2018           Medicare Wellness Visit, Female     The best way to live healthy is to have a lifestyle where you eat a well-balanced diet, exercise regularly, limit alcohol use, and quit all forms of tobacco/nicotine, if applicable.    Regular preventive services are another way to keep healthy. Preventive services (vaccines, screening tests, monitoring & exams) can help personalize your care plan, which helps you manage your own care. Screening tests can find health problems at the earliest stages, when they are easiest to treat. [facility] Patricia follows the current, evidence-based guidelines published by the [facility] Cornelio Viraj (USPSTF) when recommending preventive services for our patients. Because we follow these guidelines, sometimes recommendations change over time as research supports it. (For example, mammograms used to be recommended annually. Even though Medicare will still pay for an annual mammogram, the newer guidelines recommend a mammogram every two years for women of average risk.)  Of course, you and your doctor may decide to screen more often for some diseases, based on your risk and your health status. Preventive services for you include:  - Medicare offers their members a free annual wellness visit, which is time for you and your primary care provider to discuss and plan for your preventive service needs. Take advantage of this benefit every year!  -All adults over the age of 72 should receive the recommended pneumonia vaccines. Current USPSTF guidelines recommend a series of two vaccines for the best pneumonia protection.   -All adults should have a flu vaccine yearly and a tetanus vaccine every 10 years. All adults age 61 and older should receive a shingles vaccine once in their lifetime.    -A bone mass density test is recommended when a woman turns 65 to screen for osteoporosis. This test is only recommended one time, as a screening. Some providers will use this same test as a disease monitoring tool if you already have osteoporosis.   -All adults age 38-68 who are overweight should have a diabetes screening test once every three years.   -Other screening tests and preventive services for persons with diabetes include: an eye exam to screen for diabetic retinopathy, a kidney function test, a foot exam, and stricter control over your cholesterol.   -Cardiovascular screening for adults with routine risk involves an electrocardiogram (ECG) at intervals determined by your doctor.   -Colorectal cancer screenings should be done for adults age 54-65 with no increased risk factors for colorectal cancer. There are a number of acceptable methods of screening for this type of cancer. Each test has its own benefits and drawbacks. Discuss with your doctor what is most appropriate for you during your annual wellness visit. The different tests include: colonoscopy (considered the best screening method), a fecal occult blood test, a fecal DNA test, and sigmoidoscopy. -Breast cancer screenings are recommended every other year for women of normal risk, age 54-69.  -Cervical cancer screenings for women over age 72 are only recommended with certain risk factors.   -All adults born between [facility] should be screened once for Hepatitis C. Here is a list of your current Health Maintenance items (your personalized list of preventive services) with a due date:  Health Maintenance Due   Topic Date Due    DTaP/Tdap/Td  (1 - Tdap) 07/02/1962    Shingles Vaccine (1 of 2) 07/02/1991    Glaucoma Screening   07/02/2006    Bone Mineral Density   07/02/2006    Pneumococcal Vaccine (1 of 2 - PCV13) 07/02/2006    Flu Vaccine  08/01/2018    Annual Well Visit  09/29/2018         All Medicare beneficiaries aged 48 and older are covered; however, when a beneficiary is at high risk, there is no minimum age required to receive a screening colonoscopy or a barium enema rendered as an alternative to a screening colonoscopy. The following are the coverage criteria for each colorectal cancer screening test/procedure.     Screening FOBT  Medicare provides coverage of a screening FOBT annually (i.e., at least 11 months have passed following the month in which the last covered screening FOBT was performed) for beneficiaries aged 48 and older. This screening requires a written order from the beneficiarys attending physician. NOTE: Medicare will only provide coverage for one FOBT per year: either HCPCS code  or CPT code 42096, but not both. Screening Colonoscopy  For Beneficiaries at [facility] for Developing Colorectal Cancer  Medicare provides coverage of a screening colonoscopy (HCPCS code ) once every 2 years for beneficiaries at high risk for developing colorectal cancer (i.e., at least 23 months have passed following the month in which the last covered screening colonoscopy [HCPCS code ] was performed). For Beneficiaries Not at [facility] for Developing Colorectal Cancer  Medicare provides coverage of a screening colonoscopy (HCPCS code ) for beneficiaries who do not meet the criteria for being at high risk for developing colorectal cancer once every 10 years (i.e., at least 119 months have passed following the month in which the last covered screening colonoscopy [HCPCS code ] was performed). If the beneficiary otherwise qualifies to have a covered screening colonoscopy (HCPCS code ) based on the above but has had a covered screening flexible sigmoidoscopy (HCPCS code ), then Medicare may cover a screening colonoscopy (HCPCS code ) only after at least 47 months have passed following the month in which the last covered screening flexible sigmoidoscopy (HCPCS code ) was performed.

## 2018-10-08 DIAGNOSIS — C78.00 CARCINOMA OF RIGHT BREAST METASTATIC TO LUNG (HCC): ICD-10-CM

## 2018-10-08 DIAGNOSIS — Z13.31 SCREENING FOR DEPRESSION: ICD-10-CM

## 2018-10-08 DIAGNOSIS — Z00.00 MEDICARE ANNUAL WELLNESS VISIT, SUBSEQUENT: ICD-10-CM

## 2018-10-08 DIAGNOSIS — Z23 ENCOUNTER FOR IMMUNIZATION: ICD-10-CM

## 2018-10-08 DIAGNOSIS — C50.911 CARCINOMA OF RIGHT BREAST METASTATIC TO LUNG (HCC): ICD-10-CM

## 2018-10-08 DIAGNOSIS — Z13.6 SCREENING FOR ISCHEMIC HEART DISEASE: ICD-10-CM

## 2018-10-08 DIAGNOSIS — I10 ESSENTIAL HYPERTENSION: ICD-10-CM

## 2018-10-08 RX ORDER — ATENOLOL 25 MG/1
TABLET ORAL
Qty: 90 TAB | Refills: 3 | Status: SHIPPED | OUTPATIENT
Start: 2018-10-08 | End: 2019-07-29 | Stop reason: SDUPTHER

## 2019-01-08 ENCOUNTER — HOSPITAL ENCOUNTER (OUTPATIENT)
Dept: NON INVASIVE DIAGNOSTICS | Age: 79
Discharge: HOME | End: 2019-01-08
Attending: INTERNAL MEDICINE
Payer: MEDICARE

## 2019-01-08 DIAGNOSIS — D64.81 ANEMIA DUE TO CHEMOTHERAPY: ICD-10-CM

## 2019-01-08 DIAGNOSIS — T45.1X5A ANEMIA DUE TO CHEMOTHERAPY: ICD-10-CM

## 2019-01-08 DIAGNOSIS — C50.111 MALIGNANT NEOPLASM OF CENTRAL PORTION OF RIGHT FEMALE BREAST (HCC): ICD-10-CM

## 2019-01-08 DIAGNOSIS — Z90.710 VAGINAL PAP SMEAR FOLLOWING HYSTERECTOMY FOR MALIGNANCY: ICD-10-CM

## 2019-01-08 DIAGNOSIS — Z79.899 NEED FOR PROPHYLACTIC CHEMOTHERAPY: ICD-10-CM

## 2019-01-08 DIAGNOSIS — Z08 VAGINAL PAP SMEAR FOLLOWING HYSTERECTOMY FOR MALIGNANCY: ICD-10-CM

## 2019-01-08 DIAGNOSIS — R19.7 DIARRHEA: ICD-10-CM

## 2019-01-08 DIAGNOSIS — R11.2 NAUSEA WITH VOMITING: ICD-10-CM

## 2019-01-08 PROCEDURE — 93306 TTE W/DOPPLER COMPLETE: CPT

## 2019-03-18 RX ORDER — OMEPRAZOLE 40 MG/1
CAPSULE, DELAYED RELEASE ORAL
Qty: 60 CAP | Refills: 2 | Status: SHIPPED | OUTPATIENT
Start: 2019-03-18 | End: 2019-06-10 | Stop reason: SDUPTHER

## 2019-04-11 ENCOUNTER — HOSPITAL ENCOUNTER (OUTPATIENT)
Dept: NON INVASIVE DIAGNOSTICS | Age: 79
Discharge: HOME | End: 2019-04-11
Attending: INTERNAL MEDICINE
Payer: MEDICARE

## 2019-04-11 DIAGNOSIS — Z79.899 NEED FOR PROPHYLACTIC CHEMOTHERAPY: ICD-10-CM

## 2019-04-11 DIAGNOSIS — R11.2 NAUSEA WITH VOMITING: ICD-10-CM

## 2019-04-11 DIAGNOSIS — Z08 VAGINAL PAP SMEAR FOLLOWING HYSTERECTOMY FOR MALIGNANCY: ICD-10-CM

## 2019-04-11 DIAGNOSIS — T45.1X5A ANEMIA DUE TO CHEMOTHERAPY: ICD-10-CM

## 2019-04-11 DIAGNOSIS — C50.111 MALIGNANT NEOPLASM OF CENTRAL PORTION OF RIGHT FEMALE BREAST (HCC): ICD-10-CM

## 2019-04-11 DIAGNOSIS — R19.7 DIARRHEA OF PRESUMED INFECTIOUS ORIGIN: ICD-10-CM

## 2019-04-11 DIAGNOSIS — D64.81 ANEMIA DUE TO CHEMOTHERAPY: ICD-10-CM

## 2019-04-11 DIAGNOSIS — Z90.710 VAGINAL PAP SMEAR FOLLOWING HYSTERECTOMY FOR MALIGNANCY: ICD-10-CM

## 2019-04-11 LAB
ECHO AO ROOT DIAM: 3.07 CM
ECHO AV CUSP MM: 1.42 CM
ECHO LA VOL 2C: 71.91 ML (ref 22–52)
ECHO LA VOL 4C: 61.11 ML (ref 22–52)
ECHO LV INTERNAL DIMENSION DIASTOLIC: 5.05 CM (ref 3.9–5.3)
ECHO LV INTERNAL DIMENSION SYSTOLIC: 3.47 CM
ECHO LV IVSD: 1.15 CM (ref 0.6–0.9)
ECHO LV IVSS: 1.55 CM
ECHO LV MASS 2D: 272.6 G (ref 67–162)
ECHO LV POSTERIOR WALL DIASTOLIC: 1.19 CM (ref 0.6–0.9)
ECHO LV POSTERIOR WALL SYSTOLIC: 1.51 CM
ECHO LVOT DIAM: 1.98 CM
ECHO MV A VELOCITY: 92.22 CM/S
ECHO MV AREA PHT: 6.3 CM2
ECHO MV E DECELERATION TIME (DT): 121.3 MS
ECHO MV E VELOCITY: 113.36 CM/S
ECHO MV E/A RATIO: 1.23
ECHO MV PRESSURE HALF TIME (PHT): 35.2 MS
ECHO PV MAX VELOCITY: 80.09 CM/S
ECHO PV PEAK GRADIENT: 2.6 MMHG
ECHO TV REGURGITANT MAX VELOCITY: 245.13 CM/S
ECHO TV REGURGITANT PEAK GRADIENT: 24 MMHG

## 2019-04-11 PROCEDURE — 93306 TTE W/DOPPLER COMPLETE: CPT

## 2019-06-10 ENCOUNTER — OFFICE VISIT (OUTPATIENT)
Dept: INTERNAL MEDICINE CLINIC | Age: 79
End: 2019-06-10

## 2019-06-10 VITALS
HEART RATE: 78 BPM | WEIGHT: 192.2 LBS | HEIGHT: 63 IN | OXYGEN SATURATION: 95 % | TEMPERATURE: 98.4 F | SYSTOLIC BLOOD PRESSURE: 140 MMHG | DIASTOLIC BLOOD PRESSURE: 70 MMHG | BODY MASS INDEX: 34.05 KG/M2 | RESPIRATION RATE: 17 BRPM

## 2019-06-10 DIAGNOSIS — N39.0 URINARY TRACT INFECTION WITHOUT HEMATURIA, SITE UNSPECIFIED: Primary | ICD-10-CM

## 2019-06-10 LAB
BACTERIA,BACTU: ABNORMAL
BILIRUB UR QL: NEGATIVE
CLARITY: ABNORMAL
COLOR UR: ABNORMAL
GLUCOSE 24H UR-MRATE: NEGATIVE G/(24.H)
HGB UR QL STRIP: ABNORMAL
KETONES UR QL STRIP.AUTO: NEGATIVE
LEUKOCYTE ESTERASE: ABNORMAL
NITRITE UR QL STRIP.AUTO: NEGATIVE
PH UR STRIP: 5 [PH] (ref 5–7)
PROT UR STRIP-MCNC: ABNORMAL MG/DL
RBC #/AREA URNS HPF: ABNORMAL #/HPF
SP GR UR REFRACTOMETRY: 1.02 (ref 1–1.03)
SQUAMOUS EPITHELIAL CELLS: ABNORMAL
UROBILINOGEN UR QL STRIP.AUTO: NEGATIVE
WBC URNS QL MICRO: ABNORMAL #/HPF

## 2019-06-10 RX ORDER — CIPROFLOXACIN 250 MG/1
250 TABLET, FILM COATED ORAL 2 TIMES DAILY
Qty: 14 TAB | Refills: 0 | Status: SHIPPED | OUTPATIENT
Start: 2019-06-10 | End: 2020-10-19

## 2019-06-10 RX ORDER — GLUCOSAMINE SULFATE 1500 MG
POWDER IN PACKET (EA) ORAL DAILY
COMMUNITY

## 2019-06-10 NOTE — PROGRESS NOTES
Carlottaon Juan Ramon Andres  Identified pt with two pt identifiers(name and ). Chief Complaint   Patient presents with    Urinary Burning    Urinary Frequency    Chills   Symptoms started yesterday. She is scheduled for infusions tomorrow for breast cancer. Patient was unable to leave enough urine to test.    1. Have you been to the ER, urgent care clinic since your last visit? Hospitalized since your last visit? no    2. Have you seen or consulted any other health care providers outside of the [facility] [place] [address] [place] since your last visit? Include any pap smears or colon screening. no      Medication reconciliation up to date and corrected with patient at this time. Today's provider has been notified of reason for visit, vitals and flowsheets obtained on patients. Reviewed record in preparation for visit, huddled with provider and have obtained necessary documentation.         Depression Risk Factor Screening:     3 most recent PHQ Screens 10/1/2018   Little interest or pleasure in doing things Not at all   Feeling down, depressed, irritable, or hopeless Not at all   Total Score PHQ 2 0       Functional Ability and Level of Safety:     Activities of Daily Living  ADL Assessment 10/1/2018   Feeding yourself No Help Needed   Getting from bed to chair No Help Needed   Getting dressed No Help Needed   Bathing or showering No Help Needed   Walk across the room (includes cane/walker) No Help Needed   Using the telphone No Help Needed   Taking your medications No Help Needed   Preparing meals No Help Needed   Managing money (expenses/bills) No Help Needed   Moderately strenuous housework (laundry) No Help Needed   Shopping for personal items (toiletries/medicines) No Help Needed   Shopping for groceries No Help Needed   Driving No Help Needed   Climbing a flight of stairs No Help Needed   Getting to places beyond walking distances No Help Needed       Fall Risk  Fall Risk Assessment, last 12 mths 10/1/2018   Able to walk? Yes   Fall in past 12 months? No       Abuse Screen  Abuse Screening Questionnaire 10/1/2018   Do you ever feel afraid of your partner? N   Are you in a relationship with someone who physically or mentally threatens you? N   Is it safe for you to go home?  Y           Health Maintenance Due   Topic    Shingrix Vaccine Age 49> (1 of 2)    GLAUCOMA SCREENING Q2Y     Bone Densitometry (Dexa) Screening     Pneumococcal 65+ years (1 of 2 - PCV13)       Wt Readings from Last 3 Encounters:   06/10/19 192 lb 3.2 oz (87.2 kg)   10/01/18 187 lb (84.8 kg)   09/28/17 191 lb (86.6 kg)     Temp Readings from Last 3 Encounters:   06/10/19 98.4 °F (36.9 °C) (Oral)   10/01/18 98.6 °F (37 °C) (Oral)   09/19/16 97 °F (36.1 °C) (Oral)     BP Readings from Last 3 Encounters:   06/10/19 173/83   10/01/18 172/83   09/28/17 140/78     Pulse Readings from Last 3 Encounters:   06/10/19 78   10/01/18 69   09/19/16 74     Vitals:    06/10/19 1032   BP: 173/83   Pulse: 78   Resp: 17   Temp: 98.4 °F (36.9 °C)   TempSrc: Oral   SpO2: 95%   Weight: 192 lb 3.2 oz (87.2 kg)   Height: 5' 3\" (1.6 m)   PainSc:   2         Patient Care Team   Patient Care Team:  Bryan Leavitt MD as PCP - General (Internal Medicine)  Lg Cruz MD as Physician (Hematology and Oncology)

## 2019-06-10 NOTE — PROGRESS NOTES
Subjective:  Ms. Sanchez Shen is a pleasant 69year old lady, patient of Dr. Benny Vu, who comes in today with a one day history of some urinary frequency and dysuria. She denies any hematuria. She denies abdominal or flank pain. She has had some chills, but no fever. Denies nausea or vomiting. In addition she tells me she is getting ready to get Herceptin and Perjeta IV tomorrow through the [facility]. Past Medical History:   Diagnosis Date    Breast cancer, right ([address] Dx 4/12/16    Dr Jaja Ayers    HTN (hypertension), benign 9/22/2017    Hypertension     Motion sickness     Nausea & vomiting      Past Surgical History:   Procedure Laterality Date    HX CATARACT REMOVAL Bilateral 1997 2005    HX ORTHOPAEDIC Left 2008    part knee replacement    UPPER GI ENDOSCOPY,BIOPSY  6/20/2016         UPPER GI ENDOSCOPY,CTRL BLEED  6/20/2016            Current Outpatient Medications on File Prior to Visit   Medication Sig Dispense Refill    cholecalciferol (VITAMIN D3) 1,000 unit cap Take  by mouth daily.  trastuzumab (HERCEPTIN IV) by IntraVENous route.  pertuzumab (PERJETA IV) by IntraVENous route.  atenolol (TENORMIN) 25 mg tablet TAKE 1 TABLET EVERY DAY 90 Tab 3    omeprazole (PRILOSEC) 40 mg capsule TAKE ONE CAPSULE BY MOUTH TWICE DAILY 60 Cap 6    multivitamin (ONE A DAY) tablet Take 1 Tab by mouth daily.  aspirin delayed-release 81 mg tablet Take  by mouth daily. No current facility-administered medications on file prior to visit. Allergies   Allergen Reactions    Wellfleet Cough Drops [Menthol] Swelling     Mouth swelling, had to go to ER    Bee Sting [Sting, Bee] Swelling    Sulfa (Sulfonamide Antibiotics) Hives     Physical Examination:  GENERAL:  Pleasant lady in no acute distress. She is alert and oriented. VITALS:  BP: initially 173/83, repeated by myself 140/70. P: 78.  R: 17.  T: 98.4. O2 sat: 95. HEENT:  Normocephalic, atraumatic.   NECK:  Supple without adenopathy. CHEST:  Lungs clear to auscultation, no rales or wheezes. CV:  Heart regular rhythm without murmur. EXTREMITIES:  No edema or calf tenderness. Distal pulses were present. ABDOMEN:  Soft, non tender, no organomegaly or masses. No CVAT. Studies:  Stat UA revealed 5-10 WBCs, 10-20 RBCs, a few bact. Culture pending. Impression:  1. UTI. Plan:  1. I opted to start her on Cipro 250 mg twice daily for seven days. 2. She is to call the [address] [place] [address] to report whether she can get her infusion tomorrow. 3. She will increase fluids and rest.  4. She certainly will contact us should she fail to improve or if her condition worsens. 5. I will call her as soon as I have the results of her culture.

## 2019-06-10 NOTE — PATIENT INSTRUCTIONS
Urinary Tract Infection in Women: Care Instructions  Your Care Instructions    A urinary tract infection, or UTI, is a general term for an infection anywhere between the kidneys and the urethra (where urine comes out). Most UTIs are bladder infections. They often cause pain or burning when you urinate. UTIs are caused by bacteria and can be cured with antibiotics. Be sure to complete your treatment so that the infection goes away. Follow-up care is a key part of your treatment and safety. Be sure to make and go to all appointments, and call your doctor if you are having problems. It's also a good idea to know your test results and keep a list of the medicines you take. How can you care for yourself at home? · Take your antibiotics as directed. Do not stop taking them just because you feel better. You need to take the full course of antibiotics. · Drink extra water and other fluids for the next day or two. This may help wash out the bacteria that are causing the infection. (If you have kidney, heart, or liver disease and have to limit fluids, talk with your doctor before you increase your fluid intake.)  · Avoid drinks that are carbonated or have caffeine. They can irritate the bladder. · Urinate often. Try to empty your bladder each time. · To relieve pain, take a hot bath or lay a heating pad set on low over your lower belly or genital area. Never go to sleep with a heating pad in place. To prevent UTIs  · Drink plenty of water each day. This helps you urinate often, which clears bacteria from your system. (If you have kidney, heart, or liver disease and have to limit fluids, talk with your doctor before you increase your fluid intake.)  · Urinate when you need to. · Urinate right after you have sex. · Change sanitary pads often. · Avoid douches, bubble baths, feminine hygiene sprays, and other feminine hygiene products that have deodorants.   · After going to the bathroom, wipe from front to back.  When should you call for help? Call your doctor now or seek immediate medical care if:    · Symptoms such as fever, chills, nausea, or vomiting get worse or appear for the first time.     · You have new pain in your back just below your rib cage. This is called flank pain.     · There is new blood or pus in your urine.     · You have any problems with your antibiotic medicine.    Watch closely for changes in your health, and be sure to contact your doctor if:    · You are not getting better after taking an antibiotic for 2 days.     · Your symptoms go away but then come back. Where can you learn more? Go to http://nav-kellen.info/. Enter I375 in the search box to learn more about \"Urinary Tract Infection in Women: Care Instructions. \"  Current as of: March 20, 2018  Content Version: 11.9  © 8321-5605 [employer]. Care instructions adapted under license by [employer] (which disclaims liability or warranty for this information). If you have questions about a medical condition or this instruction, always ask your healthcare professional. [address] any warranty or liability for your use of this information.

## 2019-06-12 LAB — BACTERIA UR CULT: NORMAL

## 2019-07-15 ENCOUNTER — HOSPITAL ENCOUNTER (OUTPATIENT)
Dept: NON INVASIVE DIAGNOSTICS | Age: 79
Discharge: HOME | End: 2019-07-15
Attending: INTERNAL MEDICINE
Payer: MEDICARE

## 2019-07-15 VITALS
SYSTOLIC BLOOD PRESSURE: 150 MMHG | WEIGHT: 192 LBS | HEIGHT: 63 IN | BODY MASS INDEX: 34.02 KG/M2 | DIASTOLIC BLOOD PRESSURE: 80 MMHG

## 2019-07-15 DIAGNOSIS — Z79.899 OTHER LONG TERM (CURRENT) DRUG THERAPY: ICD-10-CM

## 2019-07-15 DIAGNOSIS — Z08 ENCOUNTER FOR FOLLOW-UP EXAMINATION AFTER TREATMENT FOR MALIGNANT NEOPLASM: ICD-10-CM

## 2019-07-15 DIAGNOSIS — C50.111 MALIGNANT NEOPLASM OF CENTRAL PORTION OF RIGHT FEMALE BREAST (HCC): ICD-10-CM

## 2019-07-15 LAB
ECHO AV AREA PEAK VELOCITY: 1.8 CM2
ECHO AV AREA VTI: 1.7 CM2
ECHO AV AREA/BSA PEAK VELOCITY: 0.9 CM2/M2
ECHO AV AREA/BSA VTI: 0.8 CM2/M2
ECHO AV CUSP MM: 1.4 CM
ECHO AV MEAN GRADIENT: 3.8 MMHG
ECHO AV PEAK GRADIENT: 6.5 MMHG
ECHO AV PEAK VELOCITY: 127.01 CM/S
ECHO AV VTI: 30.71 CM
ECHO EST RA PRESSURE: 10 MMHG
ECHO LA AREA 2C: 19.72 CM2
ECHO LA AREA 4C: 17.4 CM2
ECHO LA MAJOR AXIS: 3.73 CM
ECHO LA VOL 2C: 53.23 ML (ref 22–52)
ECHO LA VOL 4C: 45.27 ML (ref 22–52)
ECHO LA VOL BP: 52.5 ML (ref 22–52)
ECHO LA VOL/BSA BIPLANE: 27.62 ML/M2 (ref 16–28)
ECHO LA VOLUME INDEX A2C: 28.01 ML/M2 (ref 16–28)
ECHO LA VOLUME INDEX A4C: 23.82 ML/M2 (ref 16–28)
ECHO LV E' LATERAL VELOCITY: 7.66 CM/S
ECHO LV E' SEPTAL VELOCITY: 8.09 CM/S
ECHO LV EDV A2C: 74.6 ML
ECHO LV EDV A4C: 68.3 ML
ECHO LV EDV BP: 76 ML (ref 56–104)
ECHO LV EDV INDEX A4C: 35.9 ML/M2
ECHO LV EDV INDEX BP: 40 ML/M2
ECHO LV EDV NDEX A2C: 39.3 ML/M2
ECHO LV EJECTION FRACTION A2C: 47 %
ECHO LV EJECTION FRACTION A4C: 64 %
ECHO LV EJECTION FRACTION BIPLANE: 58.6 % (ref 55–100)
ECHO LV ESV A2C: 39.8 ML
ECHO LV ESV A4C: 24.3 ML
ECHO LV ESV BP: 31.5 ML (ref 19–49)
ECHO LV ESV INDEX A2C: 20.9 ML/M2
ECHO LV ESV INDEX A4C: 12.8 ML/M2
ECHO LV ESV INDEX BP: 16.6 ML/M2
ECHO LV INTERNAL DIMENSION DIASTOLIC MMODE: 5.08 CM
ECHO LV INTERNAL DIMENSION DIASTOLIC: 4.9 CM (ref 3.9–5.3)
ECHO LV INTERNAL DIMENSION SYSTOLIC MMODE: 3.51 CM
ECHO LV INTERNAL DIMENSION SYSTOLIC: 3.29 CM
ECHO LV IVSD MMODE: 1.01 CM
ECHO LV IVSD: 1.2 CM (ref 0.6–0.9)
ECHO LV MASS 2D: 284 G (ref 67–162)
ECHO LV MASS INDEX 2D: 149.4 G/M2 (ref 43–95)
ECHO LV POSTERIOR WALL DIASTOLIC MMODE: 1.17 CM
ECHO LV POSTERIOR WALL DIASTOLIC: 1.29 CM (ref 0.6–0.9)
ECHO LVOT DIAM: 1.55 CM
ECHO LVOT PEAK GRADIENT: 5.8 MMHG
ECHO LVOT PEAK VELOCITY: 120.28 CM/S
ECHO LVOT SV: 51 ML
ECHO LVOT VTI: 26.88 CM
ECHO MV A VELOCITY: 107.33 CM/S
ECHO MV AREA PHT: 3.1 CM2
ECHO MV AREA PISA: 0 CM2
ECHO MV AREA VTI: 1.8 CM2
ECHO MV E DECELERATION TIME (DT): 223.6 MS
ECHO MV E POINT SEPTAL SEPARATION (EPSS): 5.9 CM
ECHO MV E VELOCITY: 93.68 CM/S
ECHO MV E/A RATIO: 0.87
ECHO MV E/E' LATERAL: 12.23
ECHO MV E/E' RATIO (AVERAGED): 11.9
ECHO MV E/E' SEPTAL: 11.58
ECHO MV EROA PISA: 0 CM2
ECHO MV MAX VELOCITY: 111.14 CM/S
ECHO MV MEAN GRADIENT: 2 MMHG
ECHO MV PEAK GRADIENT: 4.9 MMHG
ECHO MV PRESSURE HALF TIME (PHT): 70.4 MS
ECHO MV REGURGITANT PEAK GRADIENT: 92.1 MMHG
ECHO MV REGURGITANT PEAK VELOCITY: 479.81 CM/S
ECHO MV REGURGITANT RADIUS PISA: 0.34 CM
ECHO MV REGURGITANT VOLUME: 6.58 CC
ECHO MV REGURGITANT VTIA: 178.19 CM
ECHO MV VTI: 29.05 CM
ECHO PULMONARY ARTERY SYSTOLIC PRESSURE (PASP): 41.4 MMHG
ECHO PV MAX VELOCITY: 109.45 CM/S
ECHO PV MEAN GRADIENT: 2.3 MMHG
ECHO PV PEAK GRADIENT: 4.8 MMHG
ECHO PV VTI: 21.57 CM
ECHO RIGHT VENTRICULAR SYSTOLIC PRESSURE (RVSP): 41.4 MMHG
ECHO RV TAPSE: 2.24 CM (ref 1.5–2)
ECHO RVOT PEAK VELOCITY: 74.94 CM/S
ECHO TV A WAVE: 50.51 CM/S
ECHO TV E WAVE: 51.86 CM/S
ECHO TV EROA: 1
ECHO TV REGURGITANT MAX VELOCITY: 280.28 CM/S
ECHO TV REGURGITANT PEAK GRADIENT: 31.4 MMHG

## 2019-07-15 PROCEDURE — 93306 TTE W/DOPPLER COMPLETE: CPT

## 2019-07-29 DIAGNOSIS — I10 ESSENTIAL HYPERTENSION: ICD-10-CM

## 2019-07-29 DIAGNOSIS — Z13.6 SCREENING FOR ISCHEMIC HEART DISEASE: ICD-10-CM

## 2019-07-29 DIAGNOSIS — C78.00 CARCINOMA OF RIGHT BREAST METASTATIC TO LUNG (HCC): ICD-10-CM

## 2019-07-29 DIAGNOSIS — Z23 ENCOUNTER FOR IMMUNIZATION: ICD-10-CM

## 2019-07-29 DIAGNOSIS — C50.911 CARCINOMA OF RIGHT BREAST METASTATIC TO LUNG (HCC): ICD-10-CM

## 2019-07-29 DIAGNOSIS — Z13.31 SCREENING FOR DEPRESSION: ICD-10-CM

## 2019-07-29 DIAGNOSIS — Z00.00 MEDICARE ANNUAL WELLNESS VISIT, SUBSEQUENT: ICD-10-CM

## 2019-07-30 RX ORDER — ATENOLOL 25 MG/1
TABLET ORAL
Qty: 90 TAB | Refills: 3 | Status: SHIPPED | OUTPATIENT
Start: 2019-07-30 | End: 2020-05-11

## 2019-10-14 ENCOUNTER — OFFICE VISIT (OUTPATIENT)
Dept: INTERNAL MEDICINE CLINIC | Age: 79
End: 2019-10-14

## 2019-10-14 VITALS
SYSTOLIC BLOOD PRESSURE: 142 MMHG | BODY MASS INDEX: 35.33 KG/M2 | WEIGHT: 199.4 LBS | HEART RATE: 78 BPM | RESPIRATION RATE: 20 BRPM | DIASTOLIC BLOOD PRESSURE: 80 MMHG | HEIGHT: 63 IN | OXYGEN SATURATION: 97 %

## 2019-10-14 DIAGNOSIS — N39.0 URINARY TRACT INFECTION WITHOUT HEMATURIA, SITE UNSPECIFIED: ICD-10-CM

## 2019-10-14 DIAGNOSIS — Z13.6 SCREENING FOR ISCHEMIC HEART DISEASE: ICD-10-CM

## 2019-10-14 DIAGNOSIS — Z13.39 SCREENING FOR ALCOHOLISM: ICD-10-CM

## 2019-10-14 DIAGNOSIS — Z00.00 MEDICARE ANNUAL WELLNESS VISIT, SUBSEQUENT: Primary | ICD-10-CM

## 2019-10-14 DIAGNOSIS — Z13.31 SCREENING FOR DEPRESSION: ICD-10-CM

## 2019-10-14 DIAGNOSIS — E66.01 SEVERE OBESITY (HCC): ICD-10-CM

## 2019-10-14 DIAGNOSIS — I10 ESSENTIAL HYPERTENSION: ICD-10-CM

## 2019-10-14 DIAGNOSIS — Z13.1 SCREENING FOR DIABETES MELLITUS: ICD-10-CM

## 2019-10-14 DIAGNOSIS — Z23 ENCOUNTER FOR IMMUNIZATION: ICD-10-CM

## 2019-10-14 LAB
A-G RATIO,AGRAT: 1.3 RATIO
ALBUMIN SERPL-MCNC: 4.2 G/DL (ref 3.9–5.4)
ALP SERPL-CCNC: 113 U/L (ref 38–126)
ALT SERPL-CCNC: 22 U/L (ref 0–35)
ANION GAP SERPL CALC-SCNC: 9 MMOL/L
AST SERPL W P-5'-P-CCNC: 25 U/L (ref 14–36)
BACTERIA,BACTU: ABNORMAL
BILIRUB SERPL-MCNC: 0.6 MG/DL (ref 0.2–1.3)
BILIRUB UR QL: NEGATIVE
BUN SERPL-MCNC: 13 MG/DL (ref 7–17)
BUN/CREATININE RATIO,BUCR: 14 RATIO
CALCIUM SERPL-MCNC: 9.2 MG/DL (ref 8.4–10.2)
CHLORIDE SERPL-SCNC: 102 MMOL/L (ref 98–107)
CHOL/HDL RATIO,CHHD: 3 RATIO (ref 0–4)
CHOLEST SERPL-MCNC: 226 MG/DL (ref 0–200)
CLARITY: CLEAR
CO2 SERPL-SCNC: 29 MMOL/L (ref 22–32)
COLOR UR: ABNORMAL
CREAT SERPL-MCNC: 0.9 MG/DL (ref 0.7–1.2)
GLOBULIN,GLOB: 3.3
GLUCOSE 24H UR-MRATE: NEGATIVE G/(24.H)
GLUCOSE SERPL-MCNC: 118 MG/DL (ref 65–105)
HDLC SERPL-MCNC: 68 MG/DL (ref 35–130)
HGB UR QL STRIP: NEGATIVE
KETONES UR QL STRIP.AUTO: NEGATIVE
LDL/HDL RATIO,LDHD: 2 RATIO
LDLC SERPL CALC-MCNC: 134 MG/DL (ref 0–130)
LEUKOCYTE ESTERASE: NEGATIVE
NITRITE UR QL STRIP.AUTO: NEGATIVE
PH UR STRIP: 6 [PH] (ref 5–7)
POTASSIUM SERPL-SCNC: 4.9 MMOL/L (ref 3.6–5)
PROT SERPL-MCNC: 7.5 G/DL (ref 6.3–8.2)
PROT UR STRIP-MCNC: ABNORMAL MG/DL
RBC #/AREA URNS HPF: ABNORMAL #/HPF
SODIUM SERPL-SCNC: 140 MMOL/L (ref 137–145)
SP GR UR REFRACTOMETRY: 1.01 (ref 1–1.03)
SQUAMOUS EPITHELIAL CELLS: ABNORMAL
TRIGL SERPL-MCNC: 122 MG/DL (ref 0–200)
UROBILINOGEN UR QL STRIP.AUTO: NEGATIVE
VLDLC SERPL CALC-MCNC: 24 MG/DL
WBC URNS QL MICRO: ABNORMAL #/HPF

## 2019-10-14 NOTE — PROGRESS NOTES
After obtaining written consent and per orders of Dr. Martha Melendrez, injection of flu shot given by Jammie Painter LPN. Order and injection/medication verified by second nurse/ma review by Jacob Saini CMA. Patient tolerated procedure well. VIS was given to them. No reactions noted.

## 2019-10-14 NOTE — PROGRESS NOTES
This is the Subsequent Medicare Annual Wellness Exam, performed 12 months or more after the Initial AWV or the last Subsequent AWV    I have reviewed the patient's medical history in detail and updated the computerized patient record. History     Past Medical History:   Diagnosis Date    Breast cancer, right ([address] Dx 4/12/16    Dr Nando Arroyo    HTN (hypertension), benign 9/22/2017    Hypertension     Motion sickness     Nausea & vomiting       Past Surgical History:   Procedure Laterality Date    HX CATARACT REMOVAL Bilateral 1997 2005    HX ORTHOPAEDIC Left 2008    part knee replacement    UPPER GI ENDOSCOPY,BIOPSY  6/20/2016         UPPER GI ENDOSCOPY,CTRL BLEED  6/20/2016          Current Outpatient Medications   Medication Sig Dispense Refill    OTHER Indications: circulation and vein support      atenolol (TENORMIN) 25 mg tablet TAKE 1 TABLET EVERY DAY 90 Tab 3    cholecalciferol (VITAMIN D3) 1,000 unit cap Take  by mouth daily.  trastuzumab (HERCEPTIN IV) by IntraVENous route.  omeprazole (PRILOSEC) 40 mg capsule TAKE ONE CAPSULE BY MOUTH TWICE DAILY 60 Cap 6    multivitamin (ONE A DAY) tablet Take 1 Tab by mouth daily.  aspirin delayed-release 81 mg tablet Take  by mouth daily.  pertuzumab (PERJETA IV) by IntraVENous route.  ciprofloxacin HCl (CIPRO) 250 mg tablet Take 1 Tab by mouth two (2) times a day.  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 (HCC) C50.911, C78.00    Acute upper GI bleed K92.2    Acute blood loss anemia D62    Nausea & vomiting R11.2    HTN (hypertension) I10    Pre-syncope R55    Hypovolemia E86.1    Anemia D64.9    Breast cancer, right (HCC) C50.911    Cellulitis L03.90    Duodenal ulcer K26.9    Fatigue R53.83    History of total knee replacement Z96.659    History of uterine fibroid Z86.018    Hyperlipidemia, acquired E78.5    Neuralgia M79.2    Nipple retraction N64.53    Post-menopausal Z78.0    Sialoadenitis K11.20    HTN (hypertension), benign I10    Severe obesity (HCC) E66.01       Depression Risk Factor Screening:     3 most recent PHQ Screens 10/14/2019   Little interest or pleasure in doing things Not at all   Feeling down, depressed, irritable, or hopeless Not at all   Total Score PHQ 2 0     Alcohol Risk Factor Screening: You do not drink alcohol or very rarely. Functional Ability and Level of Safety:   Hearing Loss  The patient wears hearing aids. Activities of Daily Living  The home contains: no safety equipment. Patient does total self care    Fall Risk  Fall Risk Assessment, last 12 mths 10/14/2019   Able to walk? Yes   Fall in past 12 months? Yes   Fall with injury? No   Number of falls in past 12 months 1   Fall Risk Score 1       Abuse Screen  Patient is not abused    Cognitive Screening   Evaluation of Cognitive Function:  Has your family/caregiver stated any concerns about your memory: no  Normal    Patient Care Team   Patient Care Team:  Meryle Guise, MD as PCP - General (Internal Medicine)  Sulema Rodriguez MD as Physician (Hematology and Oncology)    Assessment/Plan   Education and counseling provided:  Are appropriate based on today's review and evaluation    Diagnoses and all orders for this visit:    1. Medicare annual wellness visit, subsequent    2.  Essential hypertension  -     METABOLIC PANEL, COMPREHENSIVE  -     LIPID PANEL  - URINALYSIS W/O MICRO    3. Screening for ischemic heart disease  -     LIPID PANEL    4. Encounter for immunization  -     INFLUENZA VIRUS VAC QUAD,SPLIT,PRESV FREE SYRINGE IM    5. Severe obesity ([address]    6. Screening for alcoholism  -     [place] ANNUAL ALCOHOL SCREEN 15 MIN    7. Screening for depression  -     DEPRESSION SCREEN ANNUAL    8. Screening for diabetes mellitus        Health Maintenance Due   Topic Date Due    Shingrix Vaccine Age 49> (1 of 2) 07/02/1991    GLAUCOMA SCREENING Q2Y  07/02/2006    Bone Densitometry (Dexa) Screening  07/02/2006    Pneumococcal 65+ years (1 of 2 - PCV13) 07/02/2006    Influenza Age 5 to Adult  08/01/2019    MEDICARE YEARLY EXAM  10/02/2019     This note will not be viewable in [address] Venancio Ennis is a 67 y.o. female and presents with Annual Wellness Visit and Hypertension  . Subjective:  Mrs. Andres presents today for annual wellness review as well as follow-up of hypertension. She has a history of breast cancer and is being followed by Dr. Amy Charles and remains on Herceptin at this time. She has some peripheral neuropathy mainly involving her feet and some intermittent loose stools that she states is a complication of the chemotherapy that she received. She remains on Tenormin for hypertension with good results. She has a previous history of a duodenal ulcer and remains on omeprazole. She denies any chest pain, palpitations, PND, orthopnea, or pedal edema.     Past Medical History:   Diagnosis Date    Breast cancer, right ([address] Dx 4/12/16    Dr Gail Clarke    HTN (hypertension), benign 9/22/2017    Hypertension     Motion sickness     Nausea & vomiting      Past Surgical History:   Procedure Laterality Date    HX CATARACT REMOVAL Bilateral 1997 2005    HX ORTHOPAEDIC Left 2008    part knee replacement    UPPER GI ENDOSCOPY,BIOPSY  6/20/2016         UPPER GI ENDOSCOPY,CTRL BLEED  6/20/2016          Allergies   Allergen Reactions    [place] Cough Drops [Menthol] Swelling     Mouth swelling, had to go to ER    Bee Sting [Sting, Bee] Swelling    Sulfa (Sulfonamide Antibiotics) Hives     Current Outpatient Medications   Medication Sig Dispense Refill    OTHER Indications: circulation and vein support      atenolol (TENORMIN) 25 mg tablet TAKE 1 TABLET EVERY DAY 90 Tab 3    cholecalciferol (VITAMIN D3) 1,000 unit cap Take  by mouth daily.  trastuzumab (HERCEPTIN IV) by IntraVENous route.  omeprazole (PRILOSEC) 40 mg capsule TAKE ONE CAPSULE BY MOUTH TWICE DAILY 60 Cap 6    multivitamin (ONE A DAY) tablet Take 1 Tab by mouth daily.  aspirin delayed-release 81 mg tablet Take  by mouth daily.  pertuzumab (PERJETA IV) by IntraVENous route.  ciprofloxacin HCl (CIPRO) 250 mg tablet Take 1 Tab by mouth two (2) times a day. 14 Tab 0     Social History     Socioeconomic History    Marital status:      Spouse name: Not on file    Number of children: Not on file    Years of education: Not on file    Highest education level: Not on file   Tobacco Use    Smoking status: Never Smoker    Smokeless tobacco: Never Used   Substance and Sexual Activity    Alcohol use: Yes     Comment:  occasional    Drug use: No     Family History   Problem Relation Age of Onset    Heart Disease Mother     Hypertension Mother        Review of Systems  Constitutional:  negative for fevers, chills, anorexia and weight loss  Eyes:    negative for visual disturbance and irritation  ENT:    negative for tinnitus,sore throat,nasal congestion,ear pains. hoarseness  Respiratory:     negative for cough, hemoptysis, dyspnea,wheezing  CV:    negative for chest pain, palpitations, lower extremity edema  GI:    negative for nausea, vomiting, diarrhea, abdominal pain,melena  Endo:               negative for polyuria,polydipsia,polyphagia,heat intolerance  Genitourinary : negative for frequency, dysuria and hematuria  Integumentary: negative for rash and pruritus  Hematologic:   negative for easy bruising and gum/nose bleeding  Musculoskel:  negative for myalgias, arthralgias, back pain, muscle weakness, joint pain  Neurological:   negative for headaches, dizziness, vertigo, memory problems and gait   Behavl/Psych:  negative for feelings of anxiety, depression, mood changes  ROS otherwise negative      Objective:  Visit Vitals  /80 (BP 1 Location: Left arm, BP Patient Position: Sitting)   Pulse 78   Resp 20   Ht 5' 3\" (1.6 m)   Wt 199 lb 6.4 oz (90.4 kg)   SpO2 97%   BMI 35.32 kg/m²     Physical Exam:   General appearance - alert, well appearing, and in no distress  Mental status - alert, oriented to person, place, and time  EYE-ENE, EOMI, fundi normal, corneas normal, no foreign bodies  ENT-ENT exam normal, no neck nodes or sinus tenderness  Nose - normal and patent, no erythema, discharge or polyps  Mouth - mucous membranes moist, pharynx normal without lesions  Neck - supple, no significant adenopathy   Chest - clear to auscultation, no wheezes, rales or rhonchi, symmetric air entry   Heart - normal rate, regular rhythm, normal S1, S2, no murmurs, rubs, clicks or gallops   Abdomen - soft, nontender, nondistended, no masses or organomegaly  Lymph- no adenopathy palpable  Ext-peripheral pulses normal, no pedal edema, no clubbing or cyanosis  Skin-Warm and dry. no hyperpigmentation, vitiligo, or suspicious lesions  Neuro -alert, oriented, normal speech, no focal findings or movement disorder noted      Assessment/Plan:  Diagnoses and all orders for this visit:    1. Medicare annual wellness visit, subsequent    2. Essential hypertension  -     METABOLIC PANEL, COMPREHENSIVE  -     LIPID PANEL  -     URINALYSIS W/O MICRO    3. Screening for ischemic heart disease  -     LIPID PANEL    4. Encounter for immunization  -     INFLUENZA VIRUS VAC QUAD,SPLIT,PRESV FREE SYRINGE IM    5. Severe obesity ([address]    6.  Screening for alcoholism  -     [place] ANNUAL ALCOHOL SCREEN 15 MIN    7. Screening for depression  -     DEPRESSION SCREEN ANNUAL    8. Screening for diabetes mellitus          ICD-10-CM ICD-9-CM    1. Medicare annual wellness visit, subsequent Z00.00 V70.0    2. Essential hypertension Q26 027.3 METABOLIC PANEL, COMPREHENSIVE      LIPID PANEL      URINALYSIS W/O MICRO   3. Screening for ischemic heart disease Z13.6 V81.0 LIPID PANEL   4. Encounter for immunization Z23 V03.89 INFLUENZA VIRUS VAC QUAD,SPLIT,PRESV FREE SYRINGE IM   5. Severe obesity ([facility] E66.01 278.01    6. Screening for alcoholism Z13.39 V79.1 [place] ANNUAL ALCOHOL SCREEN 15 MIN   7. Screening for depression Z13.31 V79.0 DEPRESSION SCREEN ANNUAL   8. Screening for diabetes mellitus Z13.1 V77.1          Follow-up and Dispositions    · Return in about 6 months (around 4/14/2020). Plan:    Continue current medical regimen as outlined above. Further recommendations based on labs as ordered. Return to clinic in 6 months for reevaluation. I have reviewed with the patient details of the assessment and plan and all questions were answered. Relevent patient education was performed. Verbal and/or written instructions (see AVS) provided. The most recent lab findings were reviewed with the patient. Plan was discussed with patient who verbally expressed understanding. An After Visit Summary was printed and given to the patient.     Mikel Ryan MD

## 2019-10-14 NOTE — PROGRESS NOTES
Chief Complaint   Patient presents with    Annual Wellness Visit    Hypertension       Depression Risk Factor Screening:     3 most recent PHQ Screens 10/14/2019   Little interest or pleasure in doing things Not at all   Feeling down, depressed, irritable, or hopeless Not at all   Total Score PHQ 2 0       Functional Ability and Level of Safety:     Activities of Daily Living  ADL Assessment 10/14/2019   Feeding yourself No Help Needed   Getting from bed to chair No Help Needed   Getting dressed No Help Needed   Bathing or showering No Help Needed   Walk across the room (includes cane/walker) No Help Needed   Using the telphone No Help Needed   Taking your medications No Help Needed   Preparing meals No Help Needed   Managing money (expenses/bills) No Help Needed   Moderately strenuous housework (laundry) No Help Needed   Shopping for personal items (toiletries/medicines) No Help Needed   Shopping for groceries No Help Needed   Driving No Help Needed   Climbing a flight of stairs No Help Needed   Getting to places beyond walking distances No Help Needed       Fall Risk  Fall Risk Assessment, last 12 mths 10/14/2019   Able to walk? Yes   Fall in past 12 months? Yes   Fall with injury? No   Number of falls in past 12 months 1   Fall Risk Score 1       Abuse Screen  Abuse Screening Questionnaire 10/14/2019   Do you ever feel afraid of your partner? N   Are you in a relationship with someone who physically or mentally threatens you? N   Is it safe for you to go home? Y     Reviewed record in preparation for visit and have obtained necessary documentation. Identified pt with two pt identifiers(name and ).     Chief Complaint   Patient presents with   [facility] Annual Wellness Visit    Hypertension      Wt Readings from Last 3 Encounters:   10/14/19 199 lb 6.4 oz (90.4 kg)   07/15/19 192 lb (87.1 kg)   06/10/19 192 lb 3.2 oz (87.2 kg)     Temp Readings from Last 3 Encounters:   06/10/19 98.4 °F (36.9 °C) (Oral)   10/01/18 98.6 °F (37 °C) (Oral)   09/19/16 97 °F (36.1 °C) (Oral)     BP Readings from Last 3 Encounters:   10/14/19 152/84   07/15/19 150/80   06/10/19 140/70     Pulse Readings from Last 3 Encounters:   10/14/19 78   06/10/19 78   10/01/18 69       Coordination of Care Questionnaire:  :     1) Have you been to an emergency room, urgent care clinic since your last visit? No     Hospitalized since your last visit? No             2) Have you seen or consulted any other health care providers outside of [address] [place] [address] since your last visit?  Yes Dr Vivien Cage            Patient Care Team   Patient Care Team:  Abhijeet Savage MD as PCP - General (Internal Medicine)  Yoni Boland MD as Physician (Hematology and Oncology)

## 2019-10-14 NOTE — PATIENT INSTRUCTIONS
Vaccine Information Statement    Influenza (Flu) Vaccine (Inactivated or Recombinant): What You Need to Know    Many Vaccine Information Statements are available in [place] and other languages. See www.immunize.org/vis  Hojas de información sobre vacunas están disponibles en español y en muchos otros idiomas. Visite www.immunize.org/vis    1. Why get vaccinated? Influenza vaccine can prevent influenza (flu). Flu is a contagious disease that spreads around the [facility] every year, usually between October and May. Anyone can get the flu, but it is more dangerous for some people. Infants and young children, people 72years of age and older, pregnant women, and people with certain health conditions or a weakened immune system are at greatest risk of flu complications. Pneumonia, bronchitis, sinus infections and ear infections are examples of flu-related complications. If you have a medical condition, such as heart disease, cancer or diabetes, flu can make it worse. Flu can cause fever and chills, sore throat, muscle aches, fatigue, cough, headache, and runny or stuffy nose. Some people may have vomiting and diarrhea, though this is more common in children than adults. Each year thousands of people in the [facility] die from flu, and many more are hospitalized. Flu vaccine prevents millions of illnesses and flu-related visits to the doctor each year. 2. Influenza vaccines     CDC recommends everyone 10months of age and older get vaccinated every flu season. Children 6 months through 6years of age may need 2 doses during a single flu season. Everyone else needs only 1 dose each flu season. It takes about 2 weeks for protection to develop after vaccination. There are many flu viruses, and they are always changing. Each year a new flu vaccine is made to protect against three or four viruses that are likely to cause disease in the upcoming flu season.  Even when the vaccine doesnt exactly match these viruses, it may still provide some protection. Influenza vaccine does not cause flu. Influenza vaccine may be given at the same time as other vaccines. 3. Talk with your health care provider    Tell your vaccine provider if the person getting the vaccine:   Has had an allergic reaction after a previous dose of influenza vaccine, or has any severe, life-threatening allergies.  Has ever had Guillain-Barré Syndrome (also called GBS). In some cases, your health care provider may decide to postpone influenza vaccination to a future visit. People with minor illnesses, such as a cold, may be vaccinated. People who are moderately or severely ill should usually wait until they recover before getting influenza vaccine. Your health care provider can give you more information. 4. Risks of a reaction     Soreness, redness, and swelling where shot is given, fever, muscle aches, and headache can happen after influenza vaccine.  There may be a very small increased risk of Guillain-Barré Syndrome (GBS) after inactivated influenza vaccine (the flu shot). Dexter Beery children who get the flu shot along with pneumococcal vaccine (PCV13), and/or DTaP vaccine at the same time might be slightly more likely to have a seizure caused by fever. Tell your health care provider if a child who is getting flu vaccine has ever had a seizure. People sometimes faint after medical procedures, including vaccination. Tell your provider if you feel dizzy or have vision changes or ringing in the ears. As with any medicine, there is a very remote chance of a vaccine causing a severe allergic reaction, other serious injury, or death. 5. What if there is a serious problem? An allergic reaction could occur after the vaccinated person leaves the clinic.  If you see signs of a severe allergic reaction (hives, swelling of the face and throat, difficulty breathing, a fast heartbeat, dizziness, or weakness), call 9-1-1 and get the person to the nearest hospital.    For other signs that concern you, call your health care provider. Adverse reactions should be reported to the Vaccine Adverse Event Reporting System (VAERS). Your health care provider will usually file this report, or you can do it yourself. Visit the VAERS website at www.vaers. hhs.gov or call 5-661.791.6121. VAERS is only for reporting reactions, and VAERS staff do not give medical advice. 6. The National Vaccine Injury Compensation Program    The [facility] Vaccine Injury Compensation Program (VICP) is a federal program that was created to compensate people who may have been injured by certain vaccines. Visit the VICP website at www.[facility].gov/vaccinecompensation or call 7-159.851.6990 to learn about the program and about filing a claim. There is a time limit to file a claim for compensation. 7. How can I learn more?  Ask your health care provider.  Call your local or state health department.  Contact the Centers for Disease Control and Prevention (CDC):  - Call 9-512.540.8605 (4-567-PEF-INFO) or  - Visit CDCs influenza website at www.cdc.gov/flu    Vaccine Information Statement (Interim)  Inactivated Influenza Vaccine   8/15/2019  43 KAROLeonarda Carver [id number]   Department of Health and Human Services  Centers for Disease Control and Prevention    Office Use Only      Medicare Wellness Visit, Female     The best way to live healthy is to have a lifestyle where you eat a well-balanced diet, exercise regularly, limit alcohol use, and quit all forms of tobacco/nicotine, if applicable. Regular preventive services are another way to keep healthy. Preventive services (vaccines, screening tests, monitoring & exams) can help personalize your care plan, which helps you manage your own care. Screening tests can find health problems at the earliest stages, when they are easiest to treat.    [facility] follows the current, evidence-based guidelines published by the [employer] (USPSTF) when recommending preventive services for our patients. Because we follow these guidelines, sometimes recommendations change over time as research supports it. (For example, mammograms used to be recommended annually. Even though Medicare will still pay for an annual mammogram, the newer guidelines recommend a mammogram every two years for women of average risk.)  Of course, you and your doctor may decide to screen more often for some diseases, based on your risk and your health status. Preventive services for you include:  - Medicare offers their members a free annual wellness visit, which is time for you and your primary care provider to discuss and plan for your preventive service needs. Take advantage of this benefit every year!  -All adults over the age of 72 should receive the recommended pneumonia vaccines. Current USPSTF guidelines recommend a series of two vaccines for the best pneumonia protection.   -All adults should have a flu vaccine yearly and a tetanus vaccine every 10 years. All adults age 61 and older should receive a shingles vaccine once in their lifetime.    -A bone mass density test is recommended when a woman turns 65 to screen for osteoporosis. This test is only recommended one time, as a screening. Some providers will use this same test as a disease monitoring tool if you already have osteoporosis.   -All adults age 38-68 who are overweight should have a diabetes screening test once every three years.   -Other screening tests and preventive services for persons with diabetes include: an eye exam to screen for diabetic retinopathy, a kidney function test, a foot exam, and stricter control over your cholesterol.   -Cardiovascular screening for adults with routine risk involves an electrocardiogram (ECG) at intervals determined by your doctor.   -Colorectal cancer screenings should be done for adults age 54-65 with no increased risk factors for colorectal cancer. There are a number of acceptable methods of screening for this type of cancer. Each test has its own benefits and drawbacks. Discuss with your doctor what is most appropriate for you during your annual wellness visit. The different tests include: colonoscopy (considered the best screening method), a fecal occult blood test, a fecal DNA test, and sigmoidoscopy. -Breast cancer screenings are recommended every other year for women of normal risk, age 54-69.  -Cervical cancer screenings for women over age 72 are only recommended with certain risk factors.   -All adults born between [facility] should be screened once for Hepatitis C. Here is a list of your current Health Maintenance items (your personalized list of preventive services) with a due date:  Health Maintenance Due   Topic Date Due    Shingles Vaccine (1 of 2) 07/02/1991    Glaucoma Screening   07/02/2006    Bone Mineral Density   07/02/2006    Pneumococcal Vaccine (1 of 2 - PCV13) 07/02/2006    Flu Vaccine  08/01/2019    Annual Well Visit  10/02/2019         All Medicare beneficiaries aged 48 and older are covered; however, when a beneficiary is at high risk, there is no minimum age required to receive a screening colonoscopy or a barium enema rendered as an alternative to a screening colonoscopy. The following are the coverage criteria for each colorectal cancer screening test/procedure. Screening FOBT  Medicare provides coverage of a screening FOBT annually (i.e., at least 11 months have passed following the month in which the last covered screening FOBT was performed) for beneficiaries aged 48 and older. This screening requires a written order from the beneficiarys attending physician. NOTE: Medicare will only provide coverage for one FOBT per year: either HCPCS code  or CPT code 85124, but not both.     Screening Colonoscopy  For Beneficiaries at [address] [place] [address] for Developing Colorectal Cancer  Medicare provides coverage of a screening colonoscopy (HCPCS code ) once every 2 years for beneficiaries at high risk for developing colorectal cancer (i.e., at least 23 months have passed following the month in which the last covered screening colonoscopy [HCPCS code ] was performed). For Beneficiaries Not at [address] for Developing Colorectal Cancer  Medicare provides coverage of a screening colonoscopy (HCPCS code ) for beneficiaries who do not meet the criteria for being at high risk for developing colorectal cancer once every 10 years (i.e., at least 119 months have passed following the month in which the last covered screening colonoscopy [HCPCS code ] was performed). If the beneficiary otherwise qualifies to have a covered screening colonoscopy (HCPCS code ) based on the above but has had a covered screening flexible sigmoidoscopy ([facility] ), then Medicare may cover a screening colonoscopy (HCPCS code ) only after at least 47 months have passed following the month in which the last covered screening flexible sigmoidoscopy (HCPCS code ) was performed.

## 2019-10-16 LAB — BACTERIA UR CULT: NORMAL

## 2019-10-17 ENCOUNTER — HOSPITAL ENCOUNTER (OUTPATIENT)
Dept: NON INVASIVE DIAGNOSTICS | Age: 79
Discharge: HOME | End: 2019-10-17
Attending: INTERNAL MEDICINE
Payer: MEDICARE

## 2019-10-17 VITALS — BODY MASS INDEX: 35.31 KG/M2 | HEIGHT: 63 IN | WEIGHT: 199.3 LBS

## 2019-10-17 DIAGNOSIS — C50.111 MALIGNANT NEOPLASM OF CENTRAL PORTION OF RIGHT FEMALE BREAST (HCC): ICD-10-CM

## 2019-10-17 DIAGNOSIS — Z79.899 ENCOUNTER FOR LONG-TERM (CURRENT) USE OF OTHER MEDICATIONS: ICD-10-CM

## 2019-10-17 LAB
ECHO AO ROOT DIAM: 3.13 CM
ECHO AV AREA PEAK VELOCITY: 2.4 CM2
ECHO AV AREA/BSA PEAK VELOCITY: 1.2 CM2/M2
ECHO AV CUSP MM: 1.37 CM
ECHO AV PEAK GRADIENT: 8.8 MMHG
ECHO AV PEAK VELOCITY: 148.21 CM/S
ECHO EST RA PRESSURE: 8 MMHG
ECHO LA AREA 4C: 23.2 CM2
ECHO LA VOL 2C: 71.82 ML (ref 22–52)
ECHO LA VOL 4C: 77.39 ML (ref 22–52)
ECHO LA VOL BP: 81.37 ML (ref 22–52)
ECHO LA VOL/BSA BIPLANE: 42.14 ML/M2 (ref 16–28)
ECHO LA VOLUME INDEX A2C: 37.19 ML/M2 (ref 16–28)
ECHO LA VOLUME INDEX A4C: 40.08 ML/M2 (ref 16–28)
ECHO LV E' LATERAL VELOCITY: 7.74 CM/S
ECHO LV E' SEPTAL VELOCITY: 7.58 CM/S
ECHO LV EDV A2C: 104.4 ML
ECHO LV EDV A4C: 104.5 ML
ECHO LV EDV BP: 106.1 ML (ref 56–104)
ECHO LV EDV INDEX A4C: 54.1 ML/M2
ECHO LV EDV INDEX BP: 54.9 ML/M2
ECHO LV EDV NDEX A2C: 54.1 ML/M2
ECHO LV EJECTION FRACTION A2C: 50 %
ECHO LV EJECTION FRACTION A4C: 54 %
ECHO LV EJECTION FRACTION BIPLANE: 52.8 % (ref 55–100)
ECHO LV ESV A2C: 52.4 ML
ECHO LV ESV A4C: 47.8 ML
ECHO LV ESV BP: 50 ML (ref 19–49)
ECHO LV ESV INDEX A2C: 27.1 ML/M2
ECHO LV ESV INDEX A4C: 24.8 ML/M2
ECHO LV ESV INDEX BP: 25.9 ML/M2
ECHO LV INTERNAL DIMENSION DIASTOLIC: 4.66 CM (ref 3.9–5.3)
ECHO LV INTERNAL DIMENSION SYSTOLIC: 2.67 CM
ECHO LV IVSD: 1.17 CM (ref 0.6–0.9)
ECHO LV IVSS: 1.74 CM
ECHO LV MASS 2D: 242.2 G (ref 67–162)
ECHO LV MASS INDEX 2D: 125.4 G/M2 (ref 43–95)
ECHO LV POSTERIOR WALL DIASTOLIC: 1.2 CM (ref 0.6–0.9)
ECHO LV POSTERIOR WALL SYSTOLIC: 1.75 CM
ECHO LVOT DIAM: 2.12 CM
ECHO LVOT PEAK GRADIENT: 4.2 MMHG
ECHO LVOT PEAK VELOCITY: 102.38 CM/S
ECHO MV A VELOCITY: 97.31 CM/S
ECHO MV AREA PISA: 0.2 CM2
ECHO MV E DECELERATION TIME (DT): 183.7 MS
ECHO MV E VELOCITY: 92.63 CM/S
ECHO MV E/A RATIO: 0.95
ECHO MV E/E' LATERAL: 11.97
ECHO MV E/E' RATIO (AVERAGED): 12.09
ECHO MV E/E' SEPTAL: 12.22
ECHO MV EROA PISA: 0.2 CM2
ECHO MV REGURGITANT PEAK GRADIENT: 114.4 MMHG
ECHO MV REGURGITANT PEAK VELOCITY: 534.82 CM/S
ECHO MV REGURGITANT RADIUS PISA: 0.68 CM
ECHO PULMONARY ARTERY SYSTOLIC PRESSURE (PASP): 35.1 MMHG
ECHO RA AREA 4C: 10.5 CM2
ECHO RIGHT VENTRICULAR SYSTOLIC PRESSURE (RVSP): 35.1 MMHG
ECHO RV INTERNAL DIMENSION: 2.75 CM
ECHO TV REGURGITANT MAX VELOCITY: 260.44 CM/S
ECHO TV REGURGITANT PEAK GRADIENT: 27.1 MMHG

## 2019-10-17 PROCEDURE — 93306 TTE W/DOPPLER COMPLETE: CPT

## 2020-01-21 ENCOUNTER — HOSPITAL ENCOUNTER (OUTPATIENT)
Dept: NON INVASIVE DIAGNOSTICS | Age: 80
Discharge: HOME | End: 2020-01-21
Attending: INTERNAL MEDICINE
Payer: MEDICARE

## 2020-01-21 VITALS
SYSTOLIC BLOOD PRESSURE: 179 MMHG | WEIGHT: 199 LBS | BODY MASS INDEX: 35.26 KG/M2 | DIASTOLIC BLOOD PRESSURE: 97 MMHG | HEIGHT: 63 IN

## 2020-01-21 DIAGNOSIS — R19.7 DIARRHEA: ICD-10-CM

## 2020-01-21 DIAGNOSIS — Z08 ENCOUNTER FOR FOLLOW-UP EXAMINATION AFTER TREATMENT FOR MALIGNANT NEOPLASM: ICD-10-CM

## 2020-01-21 DIAGNOSIS — C50.111 MALIGNANT NEOPLASM OF CENTRAL PORTION OF RIGHT FEMALE BREAST (HCC): ICD-10-CM

## 2020-01-21 DIAGNOSIS — Z79.899 OTHER LONG TERM (CURRENT) DRUG THERAPY: ICD-10-CM

## 2020-01-21 DIAGNOSIS — D64.81 ANEMIA DUE TO ANTINEOPLASTIC CHEMOTHERAPY: ICD-10-CM

## 2020-01-21 DIAGNOSIS — R11.2 NAUSEA WITH VOMITING: ICD-10-CM

## 2020-01-21 DIAGNOSIS — T45.1X5A ANEMIA DUE TO ANTINEOPLASTIC CHEMOTHERAPY: ICD-10-CM

## 2020-01-21 LAB
AV VELOCITY RATIO: 0.78
AV VTI RATIO: 0.8
ECHO AO ROOT DIAM: 2.98 CM
ECHO AV AREA PEAK VELOCITY: 2.4 CM2
ECHO AV AREA VTI: 2.5 CM2
ECHO AV MEAN GRADIENT: 3.8 MMHG
ECHO AV MEAN VELOCITY: 0.93 M/S
ECHO AV PEAK GRADIENT: 7.1 MMHG
ECHO AV PEAK VELOCITY: 133.37 CM/S
ECHO AV VTI: 32.79 CM
ECHO EST RA PRESSURE: 10 MMHG
ECHO LA AREA 4C: 22.8 CM2
ECHO LA MAJOR AXIS: 4.7 CM
ECHO LA TO AORTIC ROOT RATIO: 1.58
ECHO LA VOL 4C: 68.16 ML (ref 22–52)
ECHO LA VOLUME INDEX A4C: 35.32 ML/M2 (ref 16–28)
ECHO LV E' LATERAL VELOCITY: 5.56 CM/S
ECHO LV E' SEPTAL VELOCITY: 5.29 CM/S
ECHO LV EDV A4C: 131.4 ML
ECHO LV EDV INDEX A4C: 68.1 ML/M2
ECHO LV EJECTION FRACTION A4C: 61 %
ECHO LV ESV A4C: 50.8 ML
ECHO LV ESV INDEX A4C: 26.3 ML/M2
ECHO LV INTERNAL DIMENSION DIASTOLIC MMODE: 5.1 CM
ECHO LV INTERNAL DIMENSION DIASTOLIC: 4.87 CM (ref 3.9–5.3)
ECHO LV INTERNAL DIMENSION SYSTOLIC MMODE: 3.46 CM
ECHO LV INTERNAL DIMENSION SYSTOLIC: 3.55 CM
ECHO LV IVSD: 1.37 CM (ref 0.6–0.9)
ECHO LV MASS 2D: 339.9 G (ref 67–162)
ECHO LV MASS INDEX 2D: 176.1 G/M2 (ref 43–95)
ECHO LV POSTERIOR WALL DIASTOLIC: 1.45 CM (ref 0.6–0.9)
ECHO LVOT CARDIAC OUTPUT: 5.5 L/MIN
ECHO LVOT DIAM: 1.99 CM
ECHO LVOT PEAK GRADIENT: 4.4 MMHG
ECHO LVOT PEAK VELOCITY: 104.34 CM/S
ECHO LVOT SV: 81.7 ML
ECHO LVOT VTI: 26.29 CM
ECHO MV A VELOCITY: 108.99 CM/S
ECHO MV AREA PHT: 4.3 CM2
ECHO MV AREA VTI: 2.7 CM2
ECHO MV E DECELERATION TIME (DT): 172.4 MS
ECHO MV E VELOCITY: 109.43 CM/S
ECHO MV E/A RATIO: 1
ECHO MV E/E' LATERAL: 19.68
ECHO MV E/E' RATIO (AVERAGED): 20.18
ECHO MV E/E' SEPTAL: 20.69
ECHO MV MAX VELOCITY: 113.01 CM/S
ECHO MV MEAN GRADIENT: 2.2 MMHG
ECHO MV MEAN INFLOW VELOCITY: 0.7 M/S
ECHO MV PEAK GRADIENT: 5.1 MMHG
ECHO MV PRESSURE HALF TIME (PHT): 50.9 MS
ECHO MV REGURGITANT PEAK GRADIENT: 120.2 MMHG
ECHO MV REGURGITANT PEAK VELOCITY: 548.23 CM/S
ECHO MV VTI: 30.41 CM
ECHO PULMONARY ARTERY SYSTOLIC PRESSURE (PASP): 45.9 MMHG
ECHO PV MAX VELOCITY: 87.93 CM/S
ECHO PV MEAN GRADIENT: 1.8 MMHG
ECHO PV PEAK GRADIENT: 3.1 MMHG
ECHO PV VTI: 21.15 CM
ECHO RA AREA 4C: 11.22 CM2
ECHO RIGHT VENTRICULAR SYSTOLIC PRESSURE (RVSP): 45.9 MMHG
ECHO TV REGURGITANT MAX VELOCITY: 299.5 CM/S
ECHO TV REGURGITANT PEAK GRADIENT: 35.9 MMHG
LVFS 2D: 27.18 %
LVFS: 32.2 %
LVOT MG: 2.33 MMHG
LVOT MV: 0.73 CM/S
MV DEC SLOPE: 6.35
PULMONARY ARTERY END DIASTOLIC PRESSURE: 14.4 MMHG
PULMONARY ARTERY MEAN PRESURE: 24.9 MMHG
PV END DIASTOLIC VELOCITY: 1 MMHG

## 2020-01-21 PROCEDURE — 93306 TTE W/DOPPLER COMPLETE: CPT

## 2020-05-05 ENCOUNTER — HOSPITAL ENCOUNTER (OUTPATIENT)
Dept: NON INVASIVE DIAGNOSTICS | Age: 80
Discharge: HOME | End: 2020-05-05
Attending: INTERNAL MEDICINE
Payer: MEDICARE

## 2020-05-05 VITALS
WEIGHT: 199 LBS | DIASTOLIC BLOOD PRESSURE: 94 MMHG | BODY MASS INDEX: 35.26 KG/M2 | SYSTOLIC BLOOD PRESSURE: 170 MMHG | HEIGHT: 63 IN

## 2020-05-05 DIAGNOSIS — R11.2 NAUSEA WITH VOMITING: ICD-10-CM

## 2020-05-05 DIAGNOSIS — R19.7 DIARRHEA: ICD-10-CM

## 2020-05-05 DIAGNOSIS — Z08 ENCOUNTER FOR FOLLOW-UP EXAMINATION AFTER COMPLETED TREATMENT FOR MALIGNANT NEOPLASM: ICD-10-CM

## 2020-05-05 DIAGNOSIS — C50.111 MALIGNANT NEOPLASM OF CENTRAL PORTION OF RIGHT FEMALE BREAST (HCC): ICD-10-CM

## 2020-05-05 DIAGNOSIS — T45.1X5A ANEMIA DUE TO ANTINEOPLASTIC CHEMOTHERAPY: ICD-10-CM

## 2020-05-05 DIAGNOSIS — D64.81 ANEMIA DUE TO ANTINEOPLASTIC CHEMOTHERAPY: ICD-10-CM

## 2020-05-05 DIAGNOSIS — Z79.899 OTHER LONG TERM (CURRENT) DRUG THERAPY: ICD-10-CM

## 2020-05-05 LAB
AV VELOCITY RATIO: 0.73
AV VTI RATIO: 0.7
ECHO AO ROOT DIAM: 2.72 CM
ECHO AV AREA PEAK VELOCITY: 2.3 CM2
ECHO AV AREA VTI: 2.1 CM2
ECHO AV MEAN GRADIENT: 6 MMHG
ECHO AV MEAN VELOCITY: 1.18 M/S
ECHO AV PEAK GRADIENT: 10.7 MMHG
ECHO AV PEAK VELOCITY: 163.61 CM/S
ECHO AV VTI: 43.62 CM
ECHO EST RA PRESSURE: 10 MMHG
ECHO LA AREA 4C: 19.9 CM2
ECHO LA MAJOR AXIS: 3.48 CM
ECHO LA TO AORTIC ROOT RATIO: 1.28
ECHO LA VOL 4C: 63.99 ML (ref 22–52)
ECHO LA VOLUME INDEX A4C: 33.16 ML/M2 (ref 16–28)
ECHO LV E' LATERAL VELOCITY: 9.5 CM/S
ECHO LV E' SEPTAL VELOCITY: 9.54 CM/S
ECHO LV EDV A4C: 123 ML
ECHO LV EDV INDEX A4C: 63.7 ML/M2
ECHO LV EJECTION FRACTION A4C: 61 %
ECHO LV ESV A4C: 48.3 ML
ECHO LV ESV INDEX A4C: 25 ML/M2
ECHO LV INTERNAL DIMENSION DIASTOLIC MMODE: 6.1 CM
ECHO LV INTERNAL DIMENSION DIASTOLIC: 4.66 CM (ref 3.9–5.3)
ECHO LV INTERNAL DIMENSION SYSTOLIC MMODE: 5.15 CM
ECHO LV INTERNAL DIMENSION SYSTOLIC: 3.67 CM
ECHO LV IVSD: 1.17 CM (ref 0.6–0.9)
ECHO LV MASS 2D: 276.1 G (ref 67–162)
ECHO LV MASS INDEX 2D: 143.1 G/M2 (ref 43–95)
ECHO LV POSTERIOR WALL DIASTOLIC: 1.41 CM (ref 0.6–0.9)
ECHO LVOT CARDIAC OUTPUT: 6.8 L/MIN
ECHO LVOT DIAM: 2.02 CM
ECHO LVOT PEAK GRADIENT: 5.7 MMHG
ECHO LVOT PEAK VELOCITY: 118.89 CM/S
ECHO LVOT SV: 92.7 ML
ECHO LVOT VTI: 28.81 CM
ECHO MV A VELOCITY: 127.66 CM/S
ECHO MV AREA PHT: 5.5 CM2
ECHO MV AREA VTI: 2.9 CM2
ECHO MV E DECELERATION TIME (DT): 186.7 MS
ECHO MV E VELOCITY: 106.04 CM/S
ECHO MV E/A RATIO: 0.83
ECHO MV E/E' LATERAL: 11.16
ECHO MV E/E' RATIO (AVERAGED): 11.14
ECHO MV E/E' SEPTAL: 11.12
ECHO MV MAX VELOCITY: 135.38 CM/S
ECHO MV MEAN GRADIENT: 4 MMHG
ECHO MV MEAN INFLOW VELOCITY: 0.96 M/S
ECHO MV PEAK GRADIENT: 7.3 MMHG
ECHO MV PRESSURE HALF TIME (PHT): 40.2 MS
ECHO MV REGURGITANT PEAK GRADIENT: 103.9 MMHG
ECHO MV REGURGITANT PEAK VELOCITY: 509.54 CM/S
ECHO MV VTI: 32.44 CM
ECHO PULMONARY ARTERY SYSTOLIC PRESSURE (PASP): 45.1 MMHG
ECHO PV MAX VELOCITY: 88.16 CM/S
ECHO PV MEAN GRADIENT: 1.7 MMHG
ECHO PV PEAK GRADIENT: 3.1 MMHG
ECHO PV VTI: 20.03 CM
ECHO RA AREA 4C: 12.4 CM2
ECHO RIGHT VENTRICULAR SYSTOLIC PRESSURE (RVSP): 45.1 MMHG
ECHO TV REGURGITANT MAX VELOCITY: 296.37 CM/S
ECHO TV REGURGITANT PEAK GRADIENT: 35.1 MMHG
LVFS 2D: 21.23 %
LVFS: 15.58 %
LVOT MG: 3.12 MMHG
LVOT MV: 0.83 CM/S
MV DEC SLOPE: 5.68

## 2020-05-05 PROCEDURE — 93306 TTE W/DOPPLER COMPLETE: CPT

## 2020-05-08 DIAGNOSIS — C78.00 CARCINOMA OF RIGHT BREAST METASTATIC TO LUNG (HCC): ICD-10-CM

## 2020-05-08 DIAGNOSIS — C50.911 CARCINOMA OF RIGHT BREAST METASTATIC TO LUNG (HCC): ICD-10-CM

## 2020-05-08 DIAGNOSIS — Z13.31 SCREENING FOR DEPRESSION: ICD-10-CM

## 2020-05-08 DIAGNOSIS — I10 ESSENTIAL HYPERTENSION: ICD-10-CM

## 2020-05-08 DIAGNOSIS — Z00.00 MEDICARE ANNUAL WELLNESS VISIT, SUBSEQUENT: ICD-10-CM

## 2020-05-08 DIAGNOSIS — Z23 ENCOUNTER FOR IMMUNIZATION: ICD-10-CM

## 2020-05-08 DIAGNOSIS — Z13.6 SCREENING FOR ISCHEMIC HEART DISEASE: ICD-10-CM

## 2020-05-11 RX ORDER — ATENOLOL 25 MG/1
TABLET ORAL
Qty: 90 TAB | Refills: 3 | Status: SHIPPED | OUTPATIENT
Start: 2020-05-11 | End: 2021-02-23

## 2020-08-18 ENCOUNTER — HOSPITAL ENCOUNTER (OUTPATIENT)
Dept: NON INVASIVE DIAGNOSTICS | Age: 80
Discharge: HOME | End: 2020-08-18
Attending: INTERNAL MEDICINE
Payer: MEDICARE

## 2020-08-18 VITALS — WEIGHT: 199.08 LBS | HEIGHT: 63 IN | BODY MASS INDEX: 35.27 KG/M2

## 2020-08-18 DIAGNOSIS — Z08 ENCOUNTER FOR FOLLOW-UP EXAMINATION AFTER COMPLETED TREATMENT FOR MALIGNANT NEOPLASM: ICD-10-CM

## 2020-08-18 DIAGNOSIS — C50.111 MALIGNANT NEOPLASM OF CENTRAL PORTION OF RIGHT FEMALE BREAST (HCC): ICD-10-CM

## 2020-08-18 DIAGNOSIS — Z79.899 OTHER LONG TERM (CURRENT) DRUG THERAPY: ICD-10-CM

## 2020-08-18 DIAGNOSIS — R11.2 NAUSEA WITH VOMITING: ICD-10-CM

## 2020-08-18 DIAGNOSIS — T45.1X5A ANEMIA DUE TO ANTINEOPLASTIC CHEMOTHERAPY: ICD-10-CM

## 2020-08-18 DIAGNOSIS — D64.81 ANEMIA DUE TO ANTINEOPLASTIC CHEMOTHERAPY: ICD-10-CM

## 2020-08-18 DIAGNOSIS — R19.7 DIARRHEA: ICD-10-CM

## 2020-08-18 LAB
ECHO AO ROOT DIAM: 2.82 CM
ECHO AV AREA PEAK VELOCITY: 3.12 CM2
ECHO AV AREA/BSA PEAK VELOCITY: 1.6 CM2/M2
ECHO AV CUSP MM: 1.59 CM
ECHO AV PEAK GRADIENT: 3.72 MMHG
ECHO AV PEAK VELOCITY: 96.45 CM/S
ECHO EST RA PRESSURE: 10 MMHG
ECHO LA AREA 4C: 19.2 CM2
ECHO LA MAJOR AXIS: 2.95 CM
ECHO LA MINOR AXIS: 1.53 CM
ECHO LA TO AORTIC ROOT RATIO: 1.41
ECHO LA TO AORTIC ROOT RATIO: 1.41
ECHO LA VOL 2C: 111.85 ML (ref 22–52)
ECHO LA VOL 4C: 57.56 ML (ref 22–52)
ECHO LA VOL BP: 87.16 ML (ref 22–52)
ECHO LA VOL/BSA BIPLANE: 45.16 ML/M2 (ref 16–28)
ECHO LA VOLUME INDEX A2C: 57.95 ML/M2 (ref 16–28)
ECHO LA VOLUME INDEX A4C: 29.82 ML/M2 (ref 16–28)
ECHO LV E' LATERAL VELOCITY: 6.47 CM/S
ECHO LV E' SEPTAL VELOCITY: 6.77 CM/S
ECHO LV INTERNAL DIMENSION DIASTOLIC: 4.97 CM (ref 3.9–5.3)
ECHO LV INTERNAL DIMENSION SYSTOLIC: 3.11 CM
ECHO LV IVSD: 1.24 CM (ref 0.6–0.9)
ECHO LV IVSS: 1.74 CM
ECHO LV MASS 2D: 231.5 G (ref 67–162)
ECHO LV MASS INDEX 2D: 120 G/M2 (ref 43–95)
ECHO LV POSTERIOR WALL DIASTOLIC: 1.16 CM (ref 0.6–0.9)
ECHO LV POSTERIOR WALL SYSTOLIC: 1.88 CM
ECHO LVOT DIAM: 1.89 CM
ECHO LVOT PEAK GRADIENT: 4.63 MMHG
ECHO LVOT PEAK VELOCITY: 107.64 CM/S
ECHO MV A VELOCITY: 103.89 CM/S
ECHO MV E DECELERATION TIME (DT): 0.19 S
ECHO MV E VELOCITY: 94.41 CM/S
ECHO MV E/A RATIO: 0.91
ECHO MV E/E' LATERAL: 14.59
ECHO MV E/E' RATIO (AVERAGED): 14.27
ECHO MV E/E' SEPTAL: 13.95
ECHO MV EROA PISA: 0.11 CM2
ECHO MV REGURGITANT RADIUS PISA: 0.59 CM
ECHO MV REGURGITANT VOLUME: 26.54 ML
ECHO MV REGURGITANT VTIA: 241.96 CM
ECHO PV MAX VELOCITY: 99.77 CM/S
ECHO PV PEAK INSTANTANEOUS GRADIENT SYSTOLIC: 3.98 MMHG
ECHO PV REGURGITANT END DIASTOLIC MAX VELOCITY: 86.89 CM/S
ECHO PV REGURGITANT MAX VELOCITY: 624 CM/S
ECHO RIGHT VENTRICULAR SYSTOLIC PRESSURE (RVSP): 40.31 MMHG
ECHO RV INTERNAL DIMENSION: 2.99 CM
ECHO TV REGURGITANT MAX VELOCITY: 275.09 CM/S
ECHO TV REGURGITANT PEAK GRADIENT: 30.31 MMHG

## 2020-08-18 PROCEDURE — 93356 MYOCRD STRAIN IMG SPCKL TRCK: CPT

## 2020-10-19 ENCOUNTER — OFFICE VISIT (OUTPATIENT)
Dept: INTERNAL MEDICINE CLINIC | Age: 80
End: 2020-10-19
Payer: MEDICARE

## 2020-10-19 VITALS
TEMPERATURE: 98 F | HEIGHT: 63 IN | BODY MASS INDEX: 37.21 KG/M2 | WEIGHT: 210 LBS | OXYGEN SATURATION: 95 % | HEART RATE: 82 BPM | SYSTOLIC BLOOD PRESSURE: 160 MMHG | RESPIRATION RATE: 18 BRPM | DIASTOLIC BLOOD PRESSURE: 82 MMHG

## 2020-10-19 DIAGNOSIS — Z23 NEEDS FLU SHOT: ICD-10-CM

## 2020-10-19 DIAGNOSIS — C78.00 CARCINOMA OF RIGHT BREAST METASTATIC TO LUNG (HCC): ICD-10-CM

## 2020-10-19 DIAGNOSIS — Z00.00 MEDICARE ANNUAL WELLNESS VISIT, SUBSEQUENT: Primary | ICD-10-CM

## 2020-10-19 DIAGNOSIS — Z13.31 SCREENING FOR DEPRESSION: ICD-10-CM

## 2020-10-19 DIAGNOSIS — I10 ESSENTIAL HYPERTENSION: ICD-10-CM

## 2020-10-19 DIAGNOSIS — C50.911 CARCINOMA OF RIGHT BREAST METASTATIC TO LUNG (HCC): ICD-10-CM

## 2020-10-19 DIAGNOSIS — Z13.6 SCREENING FOR ISCHEMIC HEART DISEASE: ICD-10-CM

## 2020-10-19 DIAGNOSIS — E78.5 HYPERLIPIDEMIA, ACQUIRED: ICD-10-CM

## 2020-10-19 DIAGNOSIS — Z78.0 POSTMENOPAUSAL STATE: ICD-10-CM

## 2020-10-19 DIAGNOSIS — E66.01 SEVERE OBESITY (HCC): ICD-10-CM

## 2020-10-19 LAB
CHOL/HDL RATIO,CHHD: 3 RATIO (ref 0–4)
CHOLEST SERPL-MCNC: 225 MG/DL (ref 0–200)
HDLC SERPL-MCNC: 66 MG/DL (ref 35–130)
LDL/HDL RATIO,LDHD: 2 RATIO
LDLC SERPL CALC-MCNC: 133 MG/DL (ref 0–130)
TRIGL SERPL-MCNC: 131 MG/DL (ref 0–200)
VLDLC SERPL CALC-MCNC: 26 MG/DL

## 2020-10-19 PROCEDURE — G8536 NO DOC ELDER MAL SCRN: HCPCS | Performed by: INTERNAL MEDICINE

## 2020-10-19 PROCEDURE — 80061 LIPID PANEL: CPT | Performed by: INTERNAL MEDICINE

## 2020-10-19 PROCEDURE — 99214 OFFICE O/P EST MOD 30 MIN: CPT | Performed by: INTERNAL MEDICINE

## 2020-10-19 PROCEDURE — 1090F PRES/ABSN URINE INCON ASSESS: CPT | Performed by: INTERNAL MEDICINE

## 2020-10-19 PROCEDURE — G8753 SYS BP > OR = 140: HCPCS | Performed by: INTERNAL MEDICINE

## 2020-10-19 PROCEDURE — 3288F FALL RISK ASSESSMENT DOCD: CPT | Performed by: INTERNAL MEDICINE

## 2020-10-19 PROCEDURE — G8754 DIAS BP LESS 90: HCPCS | Performed by: INTERNAL MEDICINE

## 2020-10-19 PROCEDURE — G8427 DOCREV CUR MEDS BY ELIG CLIN: HCPCS | Performed by: INTERNAL MEDICINE

## 2020-10-19 PROCEDURE — G8510 SCR DEP NEG, NO PLAN REQD: HCPCS | Performed by: INTERNAL MEDICINE

## 2020-10-19 PROCEDURE — G8417 CALC BMI ABV UP PARAM F/U: HCPCS | Performed by: INTERNAL MEDICINE

## 2020-10-19 PROCEDURE — G0008 ADMIN INFLUENZA VIRUS VAC: HCPCS | Performed by: INTERNAL MEDICINE

## 2020-10-19 PROCEDURE — G0439 PPPS, SUBSEQ VISIT: HCPCS | Performed by: INTERNAL MEDICINE

## 2020-10-19 PROCEDURE — 90686 IIV4 VACC NO PRSV 0.5 ML IM: CPT

## 2020-10-19 PROCEDURE — 1100F PTFALLS ASSESS-DOCD GE2>/YR: CPT | Performed by: INTERNAL MEDICINE

## 2020-10-19 PROCEDURE — G8400 PT W/DXA NO RESULTS DOC: HCPCS | Performed by: INTERNAL MEDICINE

## 2020-10-19 NOTE — PROGRESS NOTES
After obtaining verbal consent and per orders of Dr. Kelvin Hernandez, injection of flu shot given by Fredy Mahajan LPN. Order and injection/medication verified by second nurse/ma review by Luz Olmstead LPN. Patient tolerated procedure well.  No reactions noted

## 2020-10-19 NOTE — PATIENT INSTRUCTIONS
Vaccine Information Statement    Influenza (Flu) Vaccine (Inactivated or Recombinant): What You Need to Know    Many Vaccine Information Statements are available in [place] and other languages. See www.immunize.org/vis  Hojas de información sobre vacunas están disponibles en español y en muchos otros idiomas. Visite www.immunize.org/vis    1. Why get vaccinated? Influenza vaccine can prevent influenza (flu). Flu is a contagious disease that spreads around the [facility] every year, usually between October and May. Anyone can get the flu, but it is more dangerous for some people. Infants and young children, people 72years of age and older, pregnant women, and people with certain health conditions or a weakened immune system are at greatest risk of flu complications. Pneumonia, bronchitis, sinus infections and ear infections are examples of flu-related complications. If you have a medical condition, such as heart disease, cancer or diabetes, flu can make it worse. Flu can cause fever and chills, sore throat, muscle aches, fatigue, cough, headache, and runny or stuffy nose. Some people may have vomiting and diarrhea, though this is more common in children than adults. Each year thousands of people in the [facility] die from flu, and many more are hospitalized. Flu vaccine prevents millions of illnesses and flu-related visits to the doctor each year. 2. Influenza vaccines     CDC recommends everyone 10months of age and older get vaccinated every flu season. Children 6 months through 6years of age may need 2 doses during a single flu season. Everyone else needs only 1 dose each flu season. It takes about 2 weeks for protection to develop after vaccination. There are many flu viruses, and they are always changing. Each year a new flu vaccine is made to protect against three or four viruses that are likely to cause disease in the upcoming flu season.  Even when the vaccine doesnt exactly match these viruses, it may still provide some protection. Influenza vaccine does not cause flu. Influenza vaccine may be given at the same time as other vaccines. 3. Talk with your health care provider    Tell your vaccine provider if the person getting the vaccine:   Has had an allergic reaction after a previous dose of influenza vaccine, or has any severe, life-threatening allergies.  Has ever had Guillain-Barré Syndrome (also called GBS). In some cases, your health care provider may decide to postpone influenza vaccination to a future visit. People with minor illnesses, such as a cold, may be vaccinated. People who are moderately or severely ill should usually wait until they recover before getting influenza vaccine. Your health care provider can give you more information. 4. Risks of a reaction     Soreness, redness, and swelling where shot is given, fever, muscle aches, and headache can happen after influenza vaccine.  There may be a very small increased risk of Guillain-Barré Syndrome (GBS) after inactivated influenza vaccine (the flu shot). [facility] children who get the flu shot along with pneumococcal vaccine (PCV13), and/or DTaP vaccine at the same time might be slightly more likely to have a seizure caused by fever. Tell your health care provider if a child who is getting flu vaccine has ever had a seizure. People sometimes faint after medical procedures, including vaccination. Tell your provider if you feel dizzy or have vision changes or ringing in the ears. As with any medicine, there is a very remote chance of a vaccine causing a severe allergic reaction, other serious injury, or death. 5. What if there is a serious problem? An allergic reaction could occur after the vaccinated person leaves the clinic.  If you see signs of a severe allergic reaction (hives, swelling of the face and throat, difficulty breathing, a fast heartbeat, dizziness, or weakness), call 9-1-1 and get the person to the nearest hospital.    For other signs that concern you, call your health care provider. Adverse reactions should be reported to the Vaccine Adverse Event Reporting System (VAERS). Your health care provider will usually file this report, or you can do it yourself. Visit the VAERS website at www.vaers. hhs.gov or call 6-699.293.3834. VAERS is only for reporting reactions, and VAERS staff do not give medical advice. 6. The National Vaccine Injury Compensation Program    The [facility] Vaccine Injury Compensation Program (VICP) is a federal program that was created to compensate people who may have been injured by certain vaccines. Visit the VICP website at www.[facility].gov/vaccinecompensation or call 1-882.514.8747 to learn about the program and about filing a claim. There is a time limit to file a claim for compensation. 7. How can I learn more?  Ask your health care provider.  Call your local or state health department.  Contact the Centers for Disease Control and Prevention (CDC):  - Call 4-967.623.2035 (5-630-MNF-INFO) or  - Visit CDCs influenza website at www.cdc.gov/flu    Vaccine Information Statement (Interim)  Inactivated Influenza Vaccine   8/15/2019  43 CHASIDY Montoya [id number]   Department of Health and Human Services  Centers for Disease Control and Prevention    Office Use Only      Medicare Wellness Visit, Female     The best way to live healthy is to have a lifestyle where you eat a well-balanced diet, exercise regularly, limit alcohol use, and quit all forms of tobacco/nicotine, if applicable. Regular preventive services are another way to keep healthy. Preventive services (vaccines, screening tests, monitoring & exams) can help personalize your care plan, which helps you manage your own care. Screening tests can find health problems at the earliest stages, when they are easiest to treat.    [facility] follows the current, evidence-based guidelines published by the [facility] (USPSTF) when recommending preventive services for our patients. Because we follow these guidelines, sometimes recommendations change over time as research supports it. (For example, mammograms used to be recommended annually. Even though Medicare will still pay for an annual mammogram, the newer guidelines recommend a mammogram every two years for women of average risk). Of course, you and your doctor may decide to screen more often for some diseases, based on your risk and your co-morbidities (chronic disease you are already diagnosed with). Preventive services for you include:  - Medicare offers their members a free annual wellness visit, which is time for you and your primary care provider to discuss and plan for your preventive service needs. Take advantage of this benefit every year!  -All adults over the age of 72 should receive the recommended pneumonia vaccines. Current USPSTF guidelines recommend a series of two vaccines for the best pneumonia protection.   -All adults should have a flu vaccine yearly and a tetanus vaccine every 10 years.   -All adults age 48 and older should receive the shingles vaccines (series of two vaccines). -All adults age 38-68 who are overweight should have a diabetes screening test once every three years.   -All adults born between [birth year] and 1964 should be screened once for Hepatitis C.  -Other screening tests and preventive services for persons with diabetes include: an eye exam to screen for diabetic retinopathy, a kidney function test, a foot exam, and stricter control over your cholesterol.   -Cardiovascular screening for adults with routine risk involves an electrocardiogram (ECG) at intervals determined by your doctor.   -Colorectal cancer screenings should be done for adults age 54-65 with no increased risk factors for colorectal cancer.   There are a number of acceptable methods of screening for this type of cancer. Each test has its own benefits and drawbacks. Discuss with your doctor what is most appropriate for you during your annual wellness visit. The different tests include: colonoscopy (considered the best screening method), a fecal occult blood test, a fecal DNA test, and sigmoidoscopy.    -A bone mass density test is recommended when a woman turns 65 to screen for osteoporosis. This test is only recommended one time, as a screening. Some providers will use this same test as a disease monitoring tool if you already have osteoporosis. -Breast cancer screenings are recommended every other year for women of normal risk, age 54-69.  -Cervical cancer screenings for women over age 72 are only recommended with certain risk factors.      Here is a list of your current Health Maintenance items (your personalized list of preventive services) with a due date:  Health Maintenance Due   Topic Date Due    Shingles Vaccine (1 of 2) 07/02/1991    Glaucoma Screening   07/02/2006    Bone Mineral Density   07/02/2006    Pneumococcal Vaccine (1 of 1 - PPSV23) 07/02/2006    Pneumococcal Vaccine 65+ years at Risk (1 of 2 - PCV13) 07/02/2006    Yearly Flu Vaccine (1) 09/01/2020    Annual Well Visit  10/14/2020

## 2020-10-19 NOTE — PROGRESS NOTES
This is the Subsequent Medicare Annual Wellness Exam, performed 12 months or more after the Initial AWV or the last Subsequent AWV    I have reviewed the patient's medical history in detail and updated the computerized patient record. History     Patient Active Problem List   Diagnosis Code    Inversion, nipple right N64.59    Carcinoma of right breast metastatic to lung ([facility] C50.911, C78.00    Acute upper GI bleed K92.2    Acute blood loss anemia D62    Nausea & vomiting R11.2    HTN (hypertension) I10    Pre-syncope R55    Hypovolemia E86.1    Anemia D64.9    Breast cancer, right (HCC) C50.911    Cellulitis L03.90    Duodenal ulcer K26.9    Fatigue R53.83    History of total knee replacement Z96.659    History of uterine fibroid Z86.018    Hyperlipidemia, acquired E78.5    Neuralgia M79.2    Nipple retraction N64.53    Post-menopausal Z78.0    Sialoadenitis K11.20    HTN (hypertension), benign I10    Severe obesity ([facility] E66.01     Past Medical History:   Diagnosis Date    Breast cancer, right ([facility] [facility] Dx 4/12/16    Dr Yoav Gallego    HTN (hypertension), benign 9/22/2017    Hypertension     Motion sickness     Nausea & vomiting       Past Surgical History:   Procedure Laterality Date    HX CATARACT REMOVAL Bilateral 1997 2005    HX ORTHOPAEDIC Left 2008    part knee replacement    UPPER GI ENDOSCOPY,BIOPSY  6/20/2016         UPPER GI ENDOSCOPY,CTRL BLEED  6/20/2016          Current Outpatient Medications   Medication Sig Dispense Refill    atenoloL (TENORMIN) 25 mg tablet TAKE 1 TABLET EVERY DAY 90 Tab 3    OTHER Indications: circulation and vein support      cholecalciferol (VITAMIN D3) 1,000 unit cap Take  by mouth daily.  trastuzumab (HERCEPTIN IV) by IntraVENous route.  multivitamin (ONE A DAY) tablet Take 1 Tab by mouth daily.  aspirin delayed-release 81 mg tablet Take  by mouth daily.  pertuzumab (PERJETA IV) by IntraVENous route.       omeprazole (PRILOSEC) 40 mg capsule TAKE ONE CAPSULE BY MOUTH TWICE DAILY 60 Cap 6     Allergies   Allergen Reactions    [place] Cough Drops [Menthol] Swelling     Mouth swelling, had to go to ER    Bee Sting [Sting, Bee] Swelling    Sulfa (Sulfonamide Antibiotics) Hives       Family History   Problem Relation Age of Onset    Heart Disease Mother     Hypertension Mother      Social History     Tobacco Use    Smoking status: Never Smoker    Smokeless tobacco: Never Used   Substance Use Topics    Alcohol use: Yes     Comment:  occasional       Depression Risk Factor Screening:     3 most recent PHQ Screens 10/19/2020   Little interest or pleasure in doing things Not at all   Feeling down, depressed, irritable, or hopeless Not at all   Total Score PHQ 2 0       Alcohol Risk Screen   Do you average more than 1 drink per night or more than 7 drinks a week:  No    On any one occasion in the past three months have you have had more than 3 drinks containing alcohol:  No        Functional Ability and Level of Safety:   Hearing: Hearing is good. Activities of Daily Living: The home contains: no safety equipment. Patient does total self care     Ambulation: with no difficulty     Fall Risk:  Fall Risk Assessment, last 12 mths 10/19/2020   Able to walk? Yes   Fall in past 12 months? Yes   Fall with injury? No   Number of falls in past 12 months 1   Fall Risk Score 1     Abuse Screen:  Patient is not abused       Cognitive Screening   Has your family/caregiver stated any concerns about your memory: no     Cognitive Screening: Normal - Verbal Fluency Test    Patient Care Team   Patient Care Team:  Abdifatah Adams MD as PCP - General (Internal Medicine)  Abdifatah Adams MD as PCP - REHABILITATION [facility]  Jacquelyn Cardona MD as Physician (Hematology and Oncology)    Assessment/Plan   Education and counseling provided:  Are appropriate based on today's review and evaluation    Diagnoses and all orders for this visit:    1. Medicare annual wellness visit, subsequent    2. Essential hypertension    3. Carcinoma of right breast metastatic to lung ([facility]    4. Severe obesity ([facility] [address]    5. Hyperlipidemia, acquired  -     LIPID PANEL    6. Needs flu shot  -     INFLUENZA VIRUS VAC QUAD,SPLIT,PRESV FREE SYRINGE IM    7. Screening for depression  -     DEPRESSION SCREEN ANNUAL    8. Screening for ischemic heart disease    9. Postmenopausal state  -     DEXA BONE DENSITY STUDY AXIAL; Future        Health Maintenance Due   Topic Date Due    Shingrix Vaccine Age 49> (1 of 2) 07/02/1991    GLAUCOMA SCREENING Q2Y  07/02/2006    Bone Densitometry (Dexa) Screening  07/02/2006    Pneumococcal 65+ years (1 of 1 - PPSV23) 07/02/2006    Pneumococcal 65+ yrs at Risk Vaccine (1 of 2 - PCV13) 07/02/2006    Flu Vaccine (1) 09/01/2020    Medicare Yearly Exam  10/14/2020           Suly Oreilly is a 79 y.o. female and presents with Annual Wellness Visit  . Subjective:  Mrs. Andres presents today for Medicare annual wellness review as well as follow-up of hypertension. She remains on Tenormin 25 mg daily. She has been on this medication for years now. She denies any chest pain, shortness of breath, PND, orthopnea, or pedal edema. She remains on Herceptin for breast cancer. She has not had a follow-up bone density test and this was discussed at length with her today. She remains on omeprazole for reflux. She takes 81 mg aspirin daily.     Past Medical History:   Diagnosis Date    Breast cancer, right ([facility] [address] Dx 4/12/16    Dr Edison Nettles    HTN (hypertension), benign 9/22/2017    Hypertension     Motion sickness     Nausea & vomiting      Past Surgical History:   Procedure Laterality Date    HX CATARACT REMOVAL Bilateral 1997 2005    HX ORTHOPAEDIC Left 2008    part knee replacement    UPPER GI ENDOSCOPY,BIOPSY  6/20/2016         UPPER GI ENDOSCOPY,CTRL BLEED  6/20/2016          Allergies   Allergen Reactions    [place] Cough Drops [Menthol] Swelling     Mouth swelling, had to go to ER    Bee Sting [Sting, Bee] Swelling    Sulfa (Sulfonamide Antibiotics) Hives     Current Outpatient Medications   Medication Sig Dispense Refill    atenoloL (TENORMIN) 25 mg tablet TAKE 1 TABLET EVERY DAY 90 Tab 3    OTHER Indications: circulation and vein support      cholecalciferol (VITAMIN D3) 1,000 unit cap Take  by mouth daily.  trastuzumab (HERCEPTIN IV) by IntraVENous route.  multivitamin (ONE A DAY) tablet Take 1 Tab by mouth daily.  aspirin delayed-release 81 mg tablet Take  by mouth daily.  pertuzumab (PERJETA IV) by IntraVENous route.  omeprazole (PRILOSEC) 40 mg capsule TAKE ONE CAPSULE BY MOUTH TWICE DAILY 60 Cap 6     Social History     Socioeconomic History    Marital status:      Spouse name: Not on file    Number of children: Not on file    Years of education: Not on file    Highest education level: Not on file   Tobacco Use    Smoking status: Never Smoker    Smokeless tobacco: Never Used   Substance and Sexual Activity    Alcohol use: Yes     Comment:  occasional    Drug use: No     Family History   Problem Relation Age of Onset    Heart Disease Mother     Hypertension Mother        Review of Systems  Constitutional:  negative for fevers, chills, anorexia and weight loss  Eyes:    negative for visual disturbance and irritation  ENT:    negative for tinnitus,sore throat,nasal congestion,ear pains. hoarseness  Respiratory:     negative for cough, hemoptysis, dyspnea,wheezing  CV:    negative for chest pain, palpitations, lower extremity edema  GI:    negative for nausea, vomiting, diarrhea, abdominal pain,melena  Endo:               negative for polyuria,polydipsia,polyphagia,heat intolerance  Genitourinary : negative for frequency, dysuria and hematuria  Integumentary: negative for rash and pruritus  Hematologic:   negative for easy bruising and gum/nose bleeding  Musculoskel:  negative for myalgias, arthralgias, back pain, muscle weakness, joint pain  Neurological:   negative for headaches, dizziness, vertigo, memory problems and gait   Behavl/Psych:  negative for feelings of anxiety, depression, mood changes  ROS otherwise negative      Objective:  Visit Vitals  BP (!) 160/82 (BP 1 Location: Left arm, BP Patient Position: Sitting)   Pulse 82   Temp 98 °F (36.7 °C) (Oral)   Resp 18   Ht 5' 3\" (1.6 m)   Wt 210 lb (95.3 kg)   SpO2 95%   BMI 37.20 kg/m²     Physical Exam:   General appearance - alert, well appearing, and in no distress  Mental status - alert, oriented to person, place, and time  EYE-ENE, EOMI, fundi normal, corneas normal, no foreign bodies  ENT-ENT exam normal, no neck nodes or sinus tenderness  Nose - normal and patent, no erythema, discharge or polyps  Mouth - mucous membranes moist, pharynx normal without lesions  Neck - supple, no significant adenopathy   Chest - clear to auscultation, no wheezes, rales or rhonchi, symmetric air entry   Heart - normal rate, regular rhythm, normal S1, S2, no murmurs, rubs, clicks or gallops   Abdomen - soft, nontender, nondistended, no masses or organomegaly  Lymph- no adenopathy palpable  Ext-peripheral pulses normal, no pedal edema, no clubbing or cyanosis  Skin-Warm and dry. no hyperpigmentation, vitiligo, or suspicious lesions  Neuro -alert, oriented, normal speech, no focal findings or movement disorder noted      Assessment/Plan:  Diagnoses and all orders for this visit:    1. Medicare annual wellness visit, subsequent    2. Essential hypertension    3. Carcinoma of right breast metastatic to lung ([address]    4. Severe obesity ([address]    5. Hyperlipidemia, acquired  -     LIPID PANEL    6. Needs flu shot  -     INFLUENZA VIRUS VAC QUAD,SPLIT,PRESV FREE SYRINGE IM    7. Screening for depression  -     DEPRESSION SCREEN ANNUAL    8. Screening for ischemic heart disease    9. Postmenopausal state  -     DEXA BONE DENSITY STUDY AXIAL;  Future          ICD-10-CM ICD-9-CM    1. Medicare annual wellness visit, subsequent  Z00.00 V70.0    2. Essential hypertension  I10 401.9    3. Carcinoma of right breast metastatic to lung (HCC)  C50.911 174.9     C78.00 197.0    4. Severe obesity ([address]  E66.01 278.01    5. Hyperlipidemia, acquired  E78.5 272.4 LIPID PANEL   6. Needs flu shot  Z23 V04.81 INFLUENZA VIRUS VAC QUAD,SPLIT,PRESV FREE SYRINGE IM   7. Screening for depression  Z13.31 V79.0 [address]   8. Screening for ischemic heart disease  Z13.6 V81.0    9. Postmenopausal state  Z78.0 V49.81 DEXA BONE DENSITY STUDY AXIAL         Plan:    Increase atenolol to 25 mg twice daily. Patient will have a blood pressure check next week and notify me if her blood pressure remains elevated. We discussed adjusting her regimen for her blood pressure if needed. She will be scheduled for bone density test per her Medicare annual wellness review. She will receive a quadrivalent influenza vaccine today per her request.  She will have a follow-up lipid profile with a mildly elevated LDL cholesterol and an excellent HDL cholesterol. I have reviewed with the patient details of the assessment and plan and all questions were answered. Relevent patient education was performed. Verbal and/or written instructions (see AVS) provided. The most recent lab findings were reviewed with the patient. Plan was discussed with patient who verbally expressed understanding. An After Visit Summary was printed and given to the patient.     Sirena Yap MD

## 2020-10-19 NOTE — PROGRESS NOTES
Chief Complaint   Patient presents with    Annual Wellness Visit       Depression Risk Factor Screening:     3 most recent PHQ Screens 10/19/2020   Little interest or pleasure in doing things Not at all   Feeling down, depressed, irritable, or hopeless Not at all   Total Score PHQ 2 0       Functional Ability and Level of Safety:     Activities of Daily Living  ADL Assessment 10/19/2020   Feeding yourself No Help Needed   Getting from bed to chair No Help Needed   Getting dressed No Help Needed   Bathing or showering No Help Needed   Walk across the room (includes cane/walker) No Help Needed   Using the telphone No Help Needed   Taking your medications No Help Needed   Preparing meals No Help Needed   Managing money (expenses/bills) No Help Needed   Moderately strenuous housework (laundry) No Help Needed   Shopping for personal items (toiletries/medicines) No Help Needed   Shopping for groceries No Help Needed   Driving No Help Needed   Climbing a flight of stairs No Help Needed   Getting to places beyond walking distances No Help Needed       Fall Risk  Fall Risk Assessment, last 12 mths 10/19/2020   Able to walk? Yes   Fall in past 12 months? Yes   Fall with injury? No   Number of falls in past 12 months 1   Fall Risk Score 1       Abuse Screen  Abuse Screening Questionnaire 10/19/2020   Do you ever feel afraid of your partner? N   Are you in a relationship with someone who physically or mentally threatens you? N   Is it safe for you to go home? Y     Reviewed record in preparation for visit and have obtained necessary documentation. Identified pt with two pt identifiers(name and ).     Chief Complaint   Patient presents with   [address] [facility] Annual Wellness Visit      Wt Readings from Last 3 Encounters:   10/19/20 210 lb (95.3 kg)   20 199 lb 1.2 oz (90.3 kg)   20 199 lb (90.3 kg)     Temp Readings from Last 3 Encounters:   10/19/20 98 °F (36.7 °C) (Oral)   06/10/19 98.4 °F (36.9 °C) (Oral)   10/01/18 98.6 °F (37 °C) (Oral)     BP Readings from Last 3 Encounters:   10/19/20 (!) 160/82   05/05/20 (!) 170/94   01/21/20 (!) 179/97     Pulse Readings from Last 3 Encounters:   10/19/20 82   10/14/19 78   06/10/19 78       Coordination of Care Questionnaire:  :     1) Have you been to an emergency room, urgent care clinic since your last visit? No     Hospitalized since your last visit? No               2) Have you seen or consulted any other health care providers outside of [address] [place] [address] since your last visit?  Yes Dr Shannon Valentin            Patient Care Team   Patient Care Team:  Shelly Rodriguez MD as PCP - General (Internal Medicine)  Shelly Rodriguez MD as PCP - [facility]  Diana Zuniga MD as Physician (Hematology and Oncology)

## 2020-10-22 ENCOUNTER — TRANSCRIBE ORDER (OUTPATIENT)
Dept: SCHEDULING | Age: 80
End: 2020-10-22

## 2020-10-22 DIAGNOSIS — Z79.899 ENCOUNTER FOR LONG-TERM (CURRENT) USE OF OTHER MEDICATIONS: Primary | ICD-10-CM

## 2020-10-27 ENCOUNTER — TELEPHONE (OUTPATIENT)
Dept: INTERNAL MEDICINE CLINIC | Age: 80
End: 2020-10-27

## 2020-10-27 NOTE — TELEPHONE ENCOUNTER
Patient calling to give Dr. Wesly Way her BP reading from when she saw Dr. Kristina Tyson it was 182/87. She was advised to call with her reading since she stated taking two atenolol 25mg.

## 2020-12-01 ENCOUNTER — HOSPITAL ENCOUNTER (OUTPATIENT)
Dept: NON INVASIVE DIAGNOSTICS | Age: 80
Discharge: HOME | End: 2020-12-01
Attending: INTERNAL MEDICINE
Payer: MEDICARE

## 2020-12-01 DIAGNOSIS — Z79.899 ENCOUNTER FOR LONG-TERM (CURRENT) USE OF OTHER MEDICATIONS: ICD-10-CM

## 2020-12-01 LAB
ECHO AO ROOT DIAM: 2.45 CM
ECHO AV AREA PEAK VELOCITY: 2.56 CM2
ECHO AV CUSP MM: 1.7 CM
ECHO AV PEAK GRADIENT: 9.4 MMHG
ECHO AV PEAK VELOCITY: 153.32 CM/S
ECHO EST RA PRESSURE: 10 MMHG
ECHO LA MAJOR AXIS: 4.08 CM
ECHO LA TO AORTIC ROOT RATIO: 1.54
ECHO LA TO AORTIC ROOT RATIO: 1.54
ECHO LV E' LATERAL VELOCITY: 6.77 CM/S
ECHO LV E' SEPTAL VELOCITY: 7.5 CM/S
ECHO LV EDV A2C: 112.09 ML
ECHO LV EDV A4C: 93.51 ML
ECHO LV EDV BP: 104.83 ML (ref 56–104)
ECHO LV EJECTION FRACTION A2C: 60 PERCENT
ECHO LV EJECTION FRACTION A4C: 56 PERCENT
ECHO LV EJECTION FRACTION BIPLANE: 58.4 PERCENT (ref 55–100)
ECHO LV ESV A2C: 45.14 ML
ECHO LV ESV A4C: 40.77 ML
ECHO LV ESV BP: 43.57 ML (ref 19–49)
ECHO LV INTERNAL DIMENSION DIASTOLIC: 4.93 CM (ref 3.9–5.3)
ECHO LV INTERNAL DIMENSION SYSTOLIC: 3.62 CM
ECHO LV IVSD: 1.63 CM (ref 0.6–0.9)
ECHO LV IVSS: 1.7 CM
ECHO LV MASS 2D: 311.1 G (ref 67–162)
ECHO LV POSTERIOR WALL DIASTOLIC: 1.34 CM (ref 0.6–0.9)
ECHO LV POSTERIOR WALL SYSTOLIC: 1.89 CM
ECHO LVOT DIAM: 2.08 CM
ECHO LVOT PEAK GRADIENT: 5.34 MMHG
ECHO LVOT PEAK VELOCITY: 115.58 CM/S
ECHO MV A VELOCITY: 107.21 CM/S
ECHO MV E DECELERATION TIME (DT): 197.43 MS
ECHO MV E VELOCITY: 110.67 CM/S
ECHO MV E/A RATIO: 1.03
ECHO MV E/E' LATERAL: 16.35
ECHO MV E/E' RATIO (AVERAGED): 15.55
ECHO MV E/E' SEPTAL: 14.76
ECHO MV EROA PISA: 0.14 CM2
ECHO MV MAX VELOCITY: 119.96 CM/S
ECHO MV MEAN GRADIENT: 3.03 MMHG
ECHO MV PEAK GRADIENT: 5.76 MMHG
ECHO MV REGURGITANT RADIUS PISA: 0.59 CM
ECHO MV REGURGITANT VOLUME: 27.32 ML
ECHO MV REGURGITANT VTIA: 192.21 CM
ECHO MV VTI: 32.19 CM
ECHO PV MAX VELOCITY: 89.75 CM/S
ECHO PV PEAK INSTANTANEOUS GRADIENT SYSTOLIC: 3.22 MMHG
ECHO PV REGURGITANT MAX VELOCITY: 84.27 CM/S
ECHO RIGHT VENTRICULAR SYSTOLIC PRESSURE (RVSP): 48.36 MMHG
ECHO RV INTERNAL DIMENSION: 2.51 CM
ECHO TV REGURGITANT MAX VELOCITY: 309.67 CM/S
ECHO TV REGURGITANT MAX VELOCITY: 477.92 CM/S
ECHO TV REGURGITANT PEAK GRADIENT: 38.36 MMHG
MR PISA PV: 536.09 CM/S

## 2020-12-01 PROCEDURE — 93306 TTE W/DOPPLER COMPLETE: CPT

## 2021-01-19 ENCOUNTER — TRANSCRIBE ORDER (OUTPATIENT)
Dept: SCHEDULING | Age: 81
End: 2021-01-19

## 2021-01-19 DIAGNOSIS — D64.81 ANEMIA DUE TO ANTINEOPLASTIC CHEMOTHERAPY: ICD-10-CM

## 2021-01-19 DIAGNOSIS — C50.111 MALIGNANT NEOPLASM OF CENTRAL PORTION OF RIGHT FEMALE BREAST (HCC): Primary | ICD-10-CM

## 2021-01-19 DIAGNOSIS — R11.2 NAUSEA WITH VOMITING: ICD-10-CM

## 2021-01-19 DIAGNOSIS — Z79.899 DRUG THERAPY: ICD-10-CM

## 2021-01-19 DIAGNOSIS — R19.7 DIARRHEA: ICD-10-CM

## 2021-01-19 DIAGNOSIS — T45.1X5A ANEMIA DUE TO ANTINEOPLASTIC CHEMOTHERAPY: ICD-10-CM

## 2021-01-29 ENCOUNTER — OFFICE VISIT (OUTPATIENT)
Dept: INTERNAL MEDICINE CLINIC | Age: 81
End: 2021-01-29
Payer: MEDICARE

## 2021-01-29 VITALS
RESPIRATION RATE: 16 BRPM | HEART RATE: 74 BPM | BODY MASS INDEX: 37.67 KG/M2 | WEIGHT: 212.6 LBS | HEIGHT: 63 IN | OXYGEN SATURATION: 97 % | TEMPERATURE: 98 F | DIASTOLIC BLOOD PRESSURE: 82 MMHG | SYSTOLIC BLOOD PRESSURE: 130 MMHG

## 2021-01-29 DIAGNOSIS — E66.01 SEVERE OBESITY (HCC): ICD-10-CM

## 2021-01-29 DIAGNOSIS — E78.5 HYPERLIPIDEMIA, ACQUIRED: ICD-10-CM

## 2021-01-29 DIAGNOSIS — I10 ESSENTIAL HYPERTENSION: Primary | ICD-10-CM

## 2021-01-29 PROBLEM — H35.3231 EXUDATIVE AGE-RELATED MACULAR DEGENERATION OF BOTH EYES WITH ACTIVE CHOROIDAL NEOVASCULARIZATION (HCC): Status: ACTIVE | Noted: 2021-01-29

## 2021-01-29 PROBLEM — M85.89 OSTEOPENIA OF MULTIPLE SITES: Status: ACTIVE | Noted: 2021-01-29

## 2021-01-29 PROBLEM — E78.00 PURE HYPERCHOLESTEROLEMIA: Status: ACTIVE | Noted: 2021-01-29

## 2021-01-29 PROCEDURE — G8417 CALC BMI ABV UP PARAM F/U: HCPCS | Performed by: INTERNAL MEDICINE

## 2021-01-29 PROCEDURE — 3288F FALL RISK ASSESSMENT DOCD: CPT | Performed by: INTERNAL MEDICINE

## 2021-01-29 PROCEDURE — G8536 NO DOC ELDER MAL SCRN: HCPCS | Performed by: INTERNAL MEDICINE

## 2021-01-29 PROCEDURE — 99214 OFFICE O/P EST MOD 30 MIN: CPT | Performed by: INTERNAL MEDICINE

## 2021-01-29 PROCEDURE — G8427 DOCREV CUR MEDS BY ELIG CLIN: HCPCS | Performed by: INTERNAL MEDICINE

## 2021-01-29 PROCEDURE — 1090F PRES/ABSN URINE INCON ASSESS: CPT | Performed by: INTERNAL MEDICINE

## 2021-01-29 PROCEDURE — G8399 PT W/DXA RESULTS DOCUMENT: HCPCS | Performed by: INTERNAL MEDICINE

## 2021-01-29 PROCEDURE — 1100F PTFALLS ASSESS-DOCD GE2>/YR: CPT | Performed by: INTERNAL MEDICINE

## 2021-01-29 PROCEDURE — G8752 SYS BP LESS 140: HCPCS | Performed by: INTERNAL MEDICINE

## 2021-01-29 PROCEDURE — G8510 SCR DEP NEG, NO PLAN REQD: HCPCS | Performed by: INTERNAL MEDICINE

## 2021-01-29 PROCEDURE — G8754 DIAS BP LESS 90: HCPCS | Performed by: INTERNAL MEDICINE

## 2021-01-29 RX ORDER — AMLODIPINE BESYLATE 5 MG/1
TABLET ORAL
COMMUNITY
Start: 2021-01-21 | End: 2021-05-14 | Stop reason: SDUPTHER

## 2021-01-29 NOTE — PROGRESS NOTES
This note will not be viewable in [address] Joana William is a 79 y.o. female and presents with Blood Pressure Check (1 month bp check)  . Subjective: This is follow-up presents today for follow-up blood pressure check. He has been in the office with an elevated blood pressure in October and had follow-up with her specialist and her blood pressure remained elevated. She was started on amlodipine 5 mg daily in addition to atenolol 25 mg twice daily. She had a bone density test which we reviewed today which revealed some osteopenia involving her hip and forearm. She has macular degeneration and is seeing a retina specialist at [facility] and getting a Avastin injections. She remains on Herceptin for breast cancer. Her cholesterol is mildly elevated with an excellent HDL and minimally elevated LDL cholesterol.     Past Medical History:   Diagnosis Date    Breast cancer, right ([address] Dx 4/12/16    Dr Lynch Degree    Exudative age-related macular degeneration of both eyes with active choroidal neovascularization ([address] 1/29/2021    HTN (hypertension), benign 9/22/2017    Hypertension     Motion sickness     Nausea & vomiting     Osteopenia of multiple sites 1/29/2021    Pure hypercholesterolemia 1/29/2021     Past Surgical History:   Procedure Laterality Date    HX CATARACT REMOVAL Bilateral 1997 2005    HX ORTHOPAEDIC Left 2008    part knee replacement    UPPER GI ENDOSCOPY,BIOPSY  6/20/2016         UPPER GI ENDOSCOPY,CTRL BLEED  6/20/2016          Allergies   Allergen Reactions    [place] Cough Drops [Menthol] Swelling     Mouth swelling, had to go to ER    Bee Sting [Sting, Bee] Swelling    Sulfa (Sulfonamide Antibiotics) Hives     Current Outpatient Medications   Medication Sig Dispense Refill    amLODIPine (NORVASC) 5 mg tablet       atenoloL (TENORMIN) 25 mg tablet TAKE 1 TABLET EVERY DAY (Patient taking differently: two (2) times a day.) 90 Tab 3    OTHER Indications: circulation and vein support      cholecalciferol (VITAMIN D3) 1,000 unit cap Take  by mouth daily.  trastuzumab (HERCEPTIN IV) by IntraVENous route.  pertuzumab (PERJETA IV) by IntraVENous route.  omeprazole (PRILOSEC) 40 mg capsule TAKE ONE CAPSULE BY MOUTH TWICE DAILY 60 Cap 6    multivitamin (ONE A DAY) tablet Take 1 Tab by mouth daily.  aspirin delayed-release 81 mg tablet Take  by mouth daily. Social History     Socioeconomic History    Marital status:      Spouse name: Not on file    Number of children: Not on file    Years of education: Not on file    Highest education level: Not on file   Tobacco Use    Smoking status: Never Smoker    Smokeless tobacco: Never Used   Substance and Sexual Activity    Alcohol use: Yes     Comment:  occasional    Drug use: No     Family History   Problem Relation Age of Onset    Heart Disease Mother     Hypertension Mother        Review of Systems  Constitutional:  negative for fevers, chills, anorexia and weight loss  Eyes:    negative for visual disturbance and irritation  ENT:    negative for tinnitus,sore throat,nasal congestion,ear pains. hoarseness  Respiratory:     negative for cough, hemoptysis, dyspnea,wheezing  CV:    negative for chest pain, palpitations, lower extremity edema  GI:    negative for nausea, vomiting, diarrhea, abdominal pain,melena  Endo:               negative for polyuria,polydipsia,polyphagia,heat intolerance  Genitourinary : negative for frequency, dysuria and hematuria  Integumentary: negative for rash and pruritus  Hematologic:   negative for easy bruising and gum/nose bleeding  Musculoskel:  negative for myalgias, arthralgias, back pain, muscle weakness, joint pain  Neurological:   negative for headaches, dizziness, vertigo, memory problems and gait   Behavl/Psych:  negative for feelings of anxiety, depression, mood changes  ROS otherwise negative      Objective:  Visit Vitals  /82 (BP 1 Location: Left upper arm, BP Patient Position: Sitting)   Pulse 74   Temp 98 °F (36.7 °C) (Temporal)   Resp 16   Ht 5' 3\" (1.6 m)   Wt 212 lb 9.6 oz (96.4 kg)   SpO2 97%   BMI 37.66 kg/m²     Physical Exam:   General appearance - alert, well appearing, and in no distress  Mental status - alert, oriented to person, place, and time  EYE-ENE, EOMI, fundi normal, corneas normal, no foreign bodies  ENT-ENT exam normal, no neck nodes or sinus tenderness  Nose - normal and patent, no erythema, discharge or polyps  Mouth - mucous membranes moist, pharynx normal without lesions  Neck - supple, no significant adenopathy   Chest - clear to auscultation, no wheezes, rales or rhonchi, symmetric air entry   Heart - normal rate, regular rhythm, normal S1, S2, no murmurs, rubs, clicks or gallops   Abdomen - soft, nontender, nondistended, no masses or organomegaly  Lymph- no adenopathy palpable  Ext-peripheral pulses normal, no pedal edema, no clubbing or cyanosis  Skin-Warm and dry. no hyperpigmentation, vitiligo, or suspicious lesions  Neuro -alert, oriented, normal speech, no focal findings or movement disorder noted      Assessment/Plan:  Diagnoses and all orders for this visit:    1. Essential hypertension    2. Hyperlipidemia, acquired    3. Severe obesity ([facility]          ICD-10-CM ICD-9-CM    1. Essential hypertension  I10 401.9    2. Hyperlipidemia, acquired  E78.5 272.4    3. Severe obesity ([address]  E66.01 278.01        Plan:    Continue amlodipine 5 mg daily in addition to atenolol 25 mg twice daily. Blood pressure appears to be well controlled. HDL cholesterol is excellent with a minimally elevated LDL cholesterol in the absence of other risk factors. We will continue to monitor. I have reviewed with the patient details of the assessment and plan and all questions were answered. Relevent patient education was performed.  Verbal and/or written instructions (see AVS) provided. The most recent lab findings were reviewed with the patient. Plan was discussed with patient who verbally expressed understanding. An After Visit Summary was printed and given to the patient.     Mainor Ty MD

## 2021-01-29 NOTE — PROGRESS NOTES
Lakshmi Adame is a 79 y.o. female     Chief Complaint   Patient presents with    Blood Pressure Check     1 month bp check       Visit Vitals  /82 (BP 1 Location: Left upper arm, BP Patient Position: Sitting)   Pulse 74   Temp 98 °F (36.7 °C) (Temporal)   Resp 16   Ht 5' 3\" (1.6 m)   Wt 212 lb 9.6 oz (96.4 kg)   SpO2 97%   BMI 37.66 kg/m²       Health Maintenance Due   Topic Date Due    COVID-19 Vaccine (1 of 2) 07/02/1956    Shingrix Vaccine Age 50> (1 of 2) 07/02/1991    GLAUCOMA SCREENING Q2Y  07/02/2006    Pneumococcal 65+ years (1 of 1 - PPSV23) 07/02/2006    Pneumococcal 65+ yrs at Risk Vaccine (1 of 2 - PCV13) 07/02/2006       1. Have you been to the ER, urgent care clinic since your last visit? Hospitalized since your last visit? No    2. Have you seen or consulted any other health care providers outside of the [address] [place] [address] since your last visit? Include any pap smears or colon screening.  No

## 2021-02-11 ENCOUNTER — OFFICE VISIT (OUTPATIENT)
Dept: INTERNAL MEDICINE CLINIC | Age: 81
End: 2021-02-11
Payer: MEDICARE

## 2021-02-11 VITALS
RESPIRATION RATE: 16 BRPM | WEIGHT: 209 LBS | HEART RATE: 78 BPM | DIASTOLIC BLOOD PRESSURE: 68 MMHG | HEIGHT: 63 IN | SYSTOLIC BLOOD PRESSURE: 138 MMHG | OXYGEN SATURATION: 98 % | TEMPERATURE: 97.5 F | BODY MASS INDEX: 37.03 KG/M2

## 2021-02-11 DIAGNOSIS — B02.8 HERPES ZOSTER WITH COMPLICATION: Primary | ICD-10-CM

## 2021-02-11 PROCEDURE — G8417 CALC BMI ABV UP PARAM F/U: HCPCS | Performed by: INTERNAL MEDICINE

## 2021-02-11 PROCEDURE — 3288F FALL RISK ASSESSMENT DOCD: CPT | Performed by: INTERNAL MEDICINE

## 2021-02-11 PROCEDURE — G8754 DIAS BP LESS 90: HCPCS | Performed by: INTERNAL MEDICINE

## 2021-02-11 PROCEDURE — G8427 DOCREV CUR MEDS BY ELIG CLIN: HCPCS | Performed by: INTERNAL MEDICINE

## 2021-02-11 PROCEDURE — 99213 OFFICE O/P EST LOW 20 MIN: CPT | Performed by: INTERNAL MEDICINE

## 2021-02-11 PROCEDURE — 1100F PTFALLS ASSESS-DOCD GE2>/YR: CPT | Performed by: INTERNAL MEDICINE

## 2021-02-11 PROCEDURE — G8399 PT W/DXA RESULTS DOCUMENT: HCPCS | Performed by: INTERNAL MEDICINE

## 2021-02-11 PROCEDURE — G8432 DEP SCR NOT DOC, RNG: HCPCS | Performed by: INTERNAL MEDICINE

## 2021-02-11 PROCEDURE — G8536 NO DOC ELDER MAL SCRN: HCPCS | Performed by: INTERNAL MEDICINE

## 2021-02-11 PROCEDURE — 1090F PRES/ABSN URINE INCON ASSESS: CPT | Performed by: INTERNAL MEDICINE

## 2021-02-11 PROCEDURE — G8752 SYS BP LESS 140: HCPCS | Performed by: INTERNAL MEDICINE

## 2021-02-11 RX ORDER — VALACYCLOVIR HYDROCHLORIDE 1 G/1
1000 TABLET, FILM COATED ORAL 3 TIMES DAILY
Qty: 21 TAB | Refills: 0 | Status: SHIPPED | OUTPATIENT
Start: 2021-02-11 | End: 2021-02-18

## 2021-02-11 RX ORDER — PREDNISONE 10 MG/1
10 TABLET ORAL SEE ADMIN INSTRUCTIONS
Qty: 21 TAB | Refills: 0 | Status: SHIPPED | OUTPATIENT
Start: 2021-02-11 | End: 2021-05-14 | Stop reason: ALTCHOICE

## 2021-02-11 NOTE — PROGRESS NOTES
This note will not be viewable in [address] Marion Ventura is a 79 y.o. female and presents with Rash (above right eye), Eye Swelling (right eye), and Headache  . Subjective:  Mrs. Follow-up presents today with complaint of a rash above her right eye. Her symptoms began about 3 days ago when she had a small area over her eyebrow that she describes as a raised bump with some mild discomfort. She notes by the next day she had more diffuse redness and swelling of her forehead, around her eye and involving her scalp. She notes the pain is a 3 on a scale of 1-10. She describes an increase sensitivity of her skin. She does not note any visual changes. She does have macular degeneration and is getting injections in her eyes regularly. She is scheduled to see her ophthalmologist tomorrow. Past Medical History:   Diagnosis Date    Breast cancer, right ([address] Dx 4/12/16    Dr Vanita Heimlich    Exudative age-related macular degeneration of both eyes with active choroidal neovascularization ([address] 1/29/2021    HTN (hypertension), benign 9/22/2017    Hypertension     Motion sickness     Nausea & vomiting     Osteopenia of multiple sites 1/29/2021    Pure hypercholesterolemia 1/29/2021     Past Surgical History:   Procedure Laterality Date    HX CATARACT REMOVAL Bilateral 1997 2005    HX ORTHOPAEDIC Left 2008    part knee replacement    UPPER GI ENDOSCOPY,BIOPSY  6/20/2016         UPPER GI ENDOSCOPY,CTRL BLEED  6/20/2016          Allergies   Allergen Reactions    [place] Cough Drops [Menthol] Swelling     Mouth swelling, had to go to ER    Bee Sting [Sting, Bee] Swelling    Sulfa (Sulfonamide Antibiotics) Hives     Current Outpatient Medications   Medication Sig Dispense Refill    valACYclovir (VALTREX) 1 gram tablet Take 1 Tab by mouth three (3) times daily for 7 days. 21 Tab 0    predniSONE (STERAPRED DS) 10 mg dose pack Take 1 Tab by mouth See Admin Instructions.  See administration instruction per 10mg dose pack 21 Tab 0    amLODIPine (NORVASC) 5 mg tablet       atenoloL (TENORMIN) 25 mg tablet TAKE 1 TABLET EVERY DAY (Patient taking differently: two (2) times a day.) 90 Tab 3    OTHER Indications: circulation and vein support      cholecalciferol (VITAMIN D3) 1,000 unit cap Take  by mouth daily.  trastuzumab (HERCEPTIN IV) by IntraVENous route.  omeprazole (PRILOSEC) 40 mg capsule TAKE ONE CAPSULE BY MOUTH TWICE DAILY (Patient taking differently: as needed.) 60 Cap 6    multivitamin (ONE A DAY) tablet Take 1 Tab by mouth daily.  aspirin delayed-release 81 mg tablet Take  by mouth daily. Social History     Socioeconomic History    Marital status:      Spouse name: Not on file    Number of children: Not on file    Years of education: Not on file    Highest education level: Not on file   Tobacco Use    Smoking status: Never Smoker    Smokeless tobacco: Never Used   Substance and Sexual Activity    Alcohol use: Yes     Comment:  occasional    Drug use: No     Family History   Problem Relation Age of Onset    Heart Disease Mother     Hypertension Mother        Review of Systems  Constitutional:  negative for fevers, chills, anorexia and weight loss  Eyes:    negative for visual disturbance and irritation  ENT:    negative for tinnitus,sore throat,nasal congestion,ear pains. hoarseness  Respiratory:     negative for cough, hemoptysis, dyspnea,wheezing  CV:    negative for chest pain, palpitations, lower extremity edema  GI:    negative for nausea, vomiting, diarrhea, abdominal pain,melena  Endo:               negative for polyuria,polydipsia,polyphagia,heat intolerance  Genitourinary : negative for frequency, dysuria and hematuria  Integumentary: Positive for rash and pruritus  Hematologic:   negative for easy bruising and gum/nose bleeding  Musculoskel:  negative for myalgias, arthralgias, back pain, muscle weakness, joint pain  Neurological:   negative for headaches, dizziness, vertigo, memory problems and gait   Behavl/Psych:  negative for feelings of anxiety, depression, mood changes  ROS otherwise negative      Objective:  Visit Vitals  /68 (BP 1 Location: Left upper arm, BP Patient Position: Sitting, BP Cuff Size: Large adult)   Pulse 78   Temp 97.5 °F (36.4 °C) (Oral)   Resp 16   Ht 5' 3\" (1.6 m)   Wt 209 lb (94.8 kg)   SpO2 98%   BMI 37.02 kg/m²     Physical Exam:   General appearance - alert, well appearing, and in no distress  Mental status - alert, oriented to person, place, and time  EYE-ENE, EOMI, fundi normal, corneas normal, no foreign bodies  ENT-dermatomal rash involving the first branch of the trigeminal nerve on the right, no neck nodes or sinus tenderness  Nose - normal and patent, no erythema, discharge or polyps  Mouth - mucous membranes moist, pharynx normal without lesions  Neck - supple, no significant adenopathy   Chest - clear to auscultation, no wheezes, rales or rhonchi, symmetric air entry   Heart - normal rate, regular rhythm, normal S1, S2, no murmurs, rubs, clicks or gallops   Abdomen - soft, nontender, nondistended, no masses or organomegaly  Lymph- no adenopathy palpable  Ext-peripheral pulses normal, no pedal edema, no clubbing or cyanosis  Skin-Warm and dry. no hyperpigmentation, vitiligo, or suspicious lesions  Neuro -alert, oriented, normal speech, no focal findings or movement disorder noted      Assessment/Plan:  Diagnoses and all orders for this visit:    1. Herpes zoster with complication  -     valACYclovir (VALTREX) 1 gram tablet; Take 1 Tab by mouth three (3) times daily for 7 days. -     predniSONE (STERAPRED DS) 10 mg dose pack; Take 1 Tab by mouth See Admin Instructions. See administration instruction per 10mg dose pack          ICD-10-CM ICD-9-CM    1.  Herpes zoster with complication  Q47.8 298.4 valACYclovir (VALTREX) 1 gram tablet      predniSONE (STERAPRED DS) 10 mg dose pack     Plan:    Start Valtrex as prescribed and a Sterapred Dosepak for shingles. Patient is instructed to follow-up with her ophthalmologist to evaluate her eye although I doubt there is involvement of the optic nerve. May visualize to ensure there is no involvement of the cornea. She will complete her medications and follow-up if not improved or if things progress. We discussed the complication of postherpetic neuralgia and medication for this if needed. I have reviewed with the patient details of the assessment and plan and all questions were answered. Relevent patient education was performed. Verbal and/or written instructions (see AVS) provided. The most recent lab findings were reviewed with the patient. Plan was discussed with patient who verbally expressed understanding. An After Visit Summary was printed and given to the patient.     Siddharth Peck MD

## 2021-02-11 NOTE — PROGRESS NOTES
Ynes Ortiz presents today at the clinic for    Chief Complaint   Patient presents with    Rash     above right eye    Eye Swelling     right eye    Headache        Wt Readings from Last 3 Encounters:   02/11/21 209 lb (94.8 kg)   01/29/21 212 lb 9.6 oz (96.4 kg)   10/19/20 210 lb (95.3 kg)     Temp Readings from Last 3 Encounters:   02/11/21 97.5 °F (36.4 °C) (Oral)   01/29/21 98 °F (36.7 °C) (Temporal)   10/19/20 98 °F (36.7 °C) (Oral)     BP Readings from Last 3 Encounters:   02/11/21 138/68   01/29/21 130/82   10/19/20 (!) 160/82     Pulse Readings from Last 3 Encounters:   02/11/21 78   01/29/21 74   10/19/20 82       Health Maintenance Due   Topic    COVID-19 Vaccine (1 of 2)    Shingrix Vaccine Age 50> (1 of 2)    GLAUCOMA SCREENING Q2Y     Pneumococcal 65+ years (1 of 1 - PPSV23)    Pneumococcal 65+ yrs at Risk Vaccine (1 of 2 - PCV13)         Learning Assessment:  :     Learning Assessment 4/5/2016   PRIMARY LEARNER Patient   PRIMARY LANGUAGE ENGLISH   LEARNER PREFERENCE PRIMARY LISTENING     READING   ANSWERED BY emilie jimenez   RELATIONSHIP SELF       Depression Screening:  :     3 most recent PHQ Screens 1/29/2021   Little interest or pleasure in doing things Not at all   Feeling down, depressed, irritable, or hopeless Not at all   Total Score PHQ 2 0       Fall Risk Assessment:  :     Fall Risk Assessment, last 12 mths 1/29/2021   Able to walk? Yes   Fall in past 12 months? 0   Do you feel unsteady? 0   Are you worried about falling 0   Number of falls in past 12 months -   Fall with injury? -       Abuse Screening:  :     Abuse Screening Questionnaire 10/19/2020 10/14/2019 10/1/2018 9/28/2017   Do you ever feel afraid of your partner? N N N N   Are you in a relationship with someone who physically or mentally threatens you? N N N N   Is it safe for you to go home?  Demi Beckford

## 2021-03-01 ENCOUNTER — IMMUNIZATION (OUTPATIENT)
Dept: INTERNAL MEDICINE CLINIC | Age: 81
End: 2021-03-01
Payer: MEDICARE

## 2021-03-01 ENCOUNTER — TELEPHONE (OUTPATIENT)
Dept: INTERNAL MEDICINE CLINIC | Age: 81
End: 2021-03-01

## 2021-03-01 DIAGNOSIS — Z23 ENCOUNTER FOR IMMUNIZATION: Primary | ICD-10-CM

## 2021-03-01 PROCEDURE — 0001A COVID-19, MRNA, LNP-S, PF, 30MCG/0.3ML DOSE(PFIZER): CPT | Performed by: FAMILY MEDICINE

## 2021-03-01 PROCEDURE — 91300 COVID-19, MRNA, LNP-S, PF, 30MCG/0.3ML DOSE(PFIZER): CPT | Performed by: FAMILY MEDICINE

## 2021-03-01 NOTE — TELEPHONE ENCOUNTER
Patient called complaining of continued pain from shingles. She states the pain is severe at times and it's also keeping her awake at night. Please advise.

## 2021-03-02 DIAGNOSIS — B02.29 POSTHERPETIC NEURALGIA: Primary | ICD-10-CM

## 2021-03-02 RX ORDER — GABAPENTIN 300 MG/1
300 CAPSULE ORAL
Qty: 30 CAP | Refills: 2 | Status: SHIPPED | OUTPATIENT
Start: 2021-03-02 | End: 2021-05-14

## 2021-03-02 NOTE — TELEPHONE ENCOUNTER
Lakshmi Adame is a 79 y.o. female  HIPAA verified by two patient identifiers. Please notify patient prescription for gabapentin was sent to the pharmacy. Jonn Jiang will take this medication every evening at bedtime.  If pain from shingles has not improved after taking this for several days please schedule an office visit for follow-up. Called spoke to pt. Advised her of the Rx that was sent to pharmacy. She stated she didn't want to get the medication but would just take Tylenol for the pain. Also she would like to take with Dr Santy Jean-Baptiste. Will call back on 3/3/2021.

## 2021-03-16 ENCOUNTER — HOSPITAL ENCOUNTER (OUTPATIENT)
Dept: NON INVASIVE DIAGNOSTICS | Age: 81
Discharge: HOME | End: 2021-03-16
Attending: INTERNAL MEDICINE
Payer: MEDICARE

## 2021-03-16 VITALS
BODY MASS INDEX: 37.03 KG/M2 | WEIGHT: 209 LBS | DIASTOLIC BLOOD PRESSURE: 68 MMHG | HEIGHT: 63 IN | SYSTOLIC BLOOD PRESSURE: 138 MMHG

## 2021-03-16 DIAGNOSIS — C50.111 MALIGNANT NEOPLASM OF CENTRAL PORTION OF RIGHT FEMALE BREAST (HCC): ICD-10-CM

## 2021-03-16 DIAGNOSIS — T45.1X5A ANEMIA DUE TO ANTINEOPLASTIC CHEMOTHERAPY: ICD-10-CM

## 2021-03-16 DIAGNOSIS — Z79.899 DRUG THERAPY: ICD-10-CM

## 2021-03-16 DIAGNOSIS — R11.2 NAUSEA WITH VOMITING: ICD-10-CM

## 2021-03-16 DIAGNOSIS — D64.81 ANEMIA DUE TO ANTINEOPLASTIC CHEMOTHERAPY: ICD-10-CM

## 2021-03-16 DIAGNOSIS — R19.7 DIARRHEA: ICD-10-CM

## 2021-03-16 LAB
ECHO AO ROOT DIAM: 3.07 CM
ECHO AV AREA PEAK VELOCITY: 1.31 CM2
ECHO AV AREA PEAK VELOCITY: 1.39 CM2
ECHO AV AREA PEAK VELOCITY: 1.4 CM2
ECHO AV AREA PEAK VELOCITY: 1.48 CM2
ECHO AV AREA VTI: 1.4 CM2
ECHO AV AREA/BSA VTI: 0.7 CM2/M2
ECHO AV MEAN GRADIENT: 4.66 MMHG
ECHO AV PEAK GRADIENT: 7.81 MMHG
ECHO AV PEAK GRADIENT: 8.73 MMHG
ECHO AV PEAK VELOCITY: 137.64 CM/S
ECHO AV PEAK VELOCITY: 146.88 CM/S
ECHO AV VTI: 35.86 CM
ECHO EST RA PRESSURE: 10 MMHG
ECHO LA AREA 4C: 19.94 CM2
ECHO LA MAJOR AXIS: 3.74 CM
ECHO LA MINOR AXIS: 1.9 CM
ECHO LA VOL 4C: 57.8 ML (ref 22–52)
ECHO LA VOLUME INDEX A4C: 29.34 ML/M2 (ref 16–28)
ECHO LV E' LATERAL VELOCITY: 7.95 CM/S
ECHO LV E' SEPTAL VELOCITY: 6.98 CM/S
ECHO LV INTERNAL DIMENSION DIASTOLIC: 4.05 CM (ref 3.9–5.3)
ECHO LV INTERNAL DIMENSION SYSTOLIC: 3.24 CM
ECHO LV IVSD: 0.86 CM (ref 0.6–0.9)
ECHO LV MASS 2D: 110.2 G (ref 67–162)
ECHO LV MASS INDEX 2D: 55.9 G/M2 (ref 43–95)
ECHO LV POSTERIOR WALL DIASTOLIC: 0.92 CM (ref 0.6–0.9)
ECHO LVOT CARDIAC OUTPUT: 3.36 LITER/MINUTE
ECHO LVOT DIAM: 1.52 CM
ECHO LVOT PEAK GRADIENT: 4.49 MMHG
ECHO LVOT PEAK GRADIENT: 5.02 MMHG
ECHO LVOT PEAK VELOCITY: 106 CM/S
ECHO LVOT PEAK VELOCITY: 112.03 CM/S
ECHO LVOT SV: 50.3 ML
ECHO LVOT VTI: 27.72 CM
ECHO MV A VELOCITY: 82.45 CM/S
ECHO MV AREA PHT: 4.18 CM2
ECHO MV AREA VTI: 1.76 CM2
ECHO MV E DECELERATION TIME (DT): 141.57 MS
ECHO MV E VELOCITY: 100.94 CM/S
ECHO MV E/A RATIO: 1.22
ECHO MV E/E' LATERAL: 12.7
ECHO MV E/E' RATIO (AVERAGED): 13.58
ECHO MV E/E' SEPTAL: 14.46
ECHO MV EROA PISA: 0.13 CM2
ECHO MV MAX VELOCITY: 128.75 CM/S
ECHO MV MEAN GRADIENT: 2.57 MMHG
ECHO MV PEAK GRADIENT: 6.63 MMHG
ECHO MV PRESSURE HALF TIME (PHT): 52.65 MS
ECHO MV REGURGITANT RADIUS PISA: 0.52 CM
ECHO MV REGURGITANT VOLUME: 22.42 ML
ECHO MV REGURGITANT VTIA: 172.53 CM
ECHO MV VTI: 28.6 CM
ECHO PV MAX VELOCITY: 105.7 CM/S
ECHO PV MAX VELOCITY: 107.3 CM/S
ECHO PV MEAN GRADIENT: 2.41 MMHG
ECHO PV PEAK INSTANTANEOUS GRADIENT SYSTOLIC: 4.47 MMHG
ECHO PV PEAK INSTANTANEOUS GRADIENT SYSTOLIC: 4.6 MMHG
ECHO PV VTI: 24.41 CM
ECHO RIGHT VENTRICULAR SYSTOLIC PRESSURE (RVSP): 39.56 MMHG
ECHO TV REGURGITANT MAX VELOCITY: 262.81 CM/S
ECHO TV REGURGITANT MAX VELOCITY: 416.28 CM/S
ECHO TV REGURGITANT PEAK GRADIENT: 29.56 MMHG
LVOT MG: 2.3 MMHG
MR PISA PV: 401.66 CM/S

## 2021-03-16 PROCEDURE — 93306 TTE W/DOPPLER COMPLETE: CPT

## 2021-03-22 ENCOUNTER — IMMUNIZATION (OUTPATIENT)
Dept: INTERNAL MEDICINE CLINIC | Age: 81
End: 2021-03-22
Payer: MEDICARE

## 2021-03-22 DIAGNOSIS — Z23 ENCOUNTER FOR IMMUNIZATION: Primary | ICD-10-CM

## 2021-03-22 PROCEDURE — 0002A COVID-19, MRNA, LNP-S, PF, 30MCG/0.3ML DOSE(PFIZER): CPT | Performed by: FAMILY MEDICINE

## 2021-03-22 PROCEDURE — 91300 COVID-19, MRNA, LNP-S, PF, 30MCG/0.3ML DOSE(PFIZER): CPT | Performed by: FAMILY MEDICINE

## 2021-05-14 ENCOUNTER — OFFICE VISIT (OUTPATIENT)
Dept: INTERNAL MEDICINE CLINIC | Age: 81
End: 2021-05-14
Payer: MEDICARE

## 2021-05-14 VITALS
DIASTOLIC BLOOD PRESSURE: 78 MMHG | SYSTOLIC BLOOD PRESSURE: 128 MMHG | OXYGEN SATURATION: 98 % | RESPIRATION RATE: 18 BRPM | WEIGHT: 205 LBS | HEIGHT: 63 IN | TEMPERATURE: 98 F | HEART RATE: 79 BPM | BODY MASS INDEX: 36.32 KG/M2

## 2021-05-14 DIAGNOSIS — I10 HTN (HYPERTENSION), BENIGN: Primary | ICD-10-CM

## 2021-05-14 DIAGNOSIS — C78.00 CARCINOMA OF RIGHT BREAST METASTATIC TO LUNG (HCC): ICD-10-CM

## 2021-05-14 DIAGNOSIS — C50.911 CARCINOMA OF RIGHT BREAST METASTATIC TO LUNG (HCC): ICD-10-CM

## 2021-05-14 DIAGNOSIS — M85.89 OSTEOPENIA OF MULTIPLE SITES: ICD-10-CM

## 2021-05-14 PROCEDURE — G8399 PT W/DXA RESULTS DOCUMENT: HCPCS | Performed by: INTERNAL MEDICINE

## 2021-05-14 PROCEDURE — G8754 DIAS BP LESS 90: HCPCS | Performed by: INTERNAL MEDICINE

## 2021-05-14 PROCEDURE — 99213 OFFICE O/P EST LOW 20 MIN: CPT | Performed by: INTERNAL MEDICINE

## 2021-05-14 PROCEDURE — G8427 DOCREV CUR MEDS BY ELIG CLIN: HCPCS | Performed by: INTERNAL MEDICINE

## 2021-05-14 PROCEDURE — G8510 SCR DEP NEG, NO PLAN REQD: HCPCS | Performed by: INTERNAL MEDICINE

## 2021-05-14 PROCEDURE — G8752 SYS BP LESS 140: HCPCS | Performed by: INTERNAL MEDICINE

## 2021-05-14 PROCEDURE — 3288F FALL RISK ASSESSMENT DOCD: CPT | Performed by: INTERNAL MEDICINE

## 2021-05-14 PROCEDURE — G8417 CALC BMI ABV UP PARAM F/U: HCPCS | Performed by: INTERNAL MEDICINE

## 2021-05-14 PROCEDURE — 1090F PRES/ABSN URINE INCON ASSESS: CPT | Performed by: INTERNAL MEDICINE

## 2021-05-14 PROCEDURE — 1100F PTFALLS ASSESS-DOCD GE2>/YR: CPT | Performed by: INTERNAL MEDICINE

## 2021-05-14 PROCEDURE — G8536 NO DOC ELDER MAL SCRN: HCPCS | Performed by: INTERNAL MEDICINE

## 2021-05-14 RX ORDER — AMLODIPINE BESYLATE 5 MG/1
5 TABLET ORAL DAILY
Qty: 90 TAB | Refills: 3 | Status: SHIPPED | OUTPATIENT
Start: 2021-05-14 | End: 2022-03-03

## 2021-05-14 NOTE — PROGRESS NOTES
Chief Complaint   Patient presents with    Hypertension    Shingles         1. Have you been to the ER, urgent care clinic since your last visit? no  Hospitalized since your last visit? no    2. Have you seen or consulted any other health care providers outside of the [address] [place] [address] since your last visit? Include any pap smears or colon screening.   no

## 2021-05-14 NOTE — PROGRESS NOTES
This note will not be viewable in [address] Valentin Jamison is a 79 y.o. female and presents with Hypertension and Shingles  . Subjective:  Mrs. Follow-up presents today for follow-up of hypertension. She is doing well on her current regimen which includes amlodipine 5 mg daily and atenolol 25 mg daily. She did unfortunately have shingles earlier in the year and has some postherpetic neuralgia. She was prescribed gabapentin but after reading the potential side effects of this medication she declined to take this. She rates her pain as a 4 on a scale of 1-10. She states this is tolerable and she would rather not take medication for it. She does take over-the-counter Tylenol extra strength with some benefit. She has had follow-up with oncology for her history of breast cancer. She denies any shortness of breath, chest pain, palpitations, PND, orthopnea, or pedal edema. Past Medical History:   Diagnosis Date    Breast cancer, right ([address] Dx 4/12/16    Dr Alexandria Mckenna    Exudative age-related macular degeneration of both eyes with active choroidal neovascularization ([address] 1/29/2021    HTN (hypertension), benign 9/22/2017    Hypertension     Motion sickness     Nausea & vomiting     Osteopenia of multiple sites 1/29/2021    Pure hypercholesterolemia 1/29/2021     Past Surgical History:   Procedure Laterality Date    HX CATARACT REMOVAL Bilateral 1997 2005    HX ORTHOPAEDIC Left 2008    part knee replacement    UPPER GI ENDOSCOPY,BIOPSY  6/20/2016         UPPER GI ENDOSCOPY,CTRL BLEED  6/20/2016          Allergies   Allergen Reactions    [place] Cough Drops [Menthol] Swelling     Mouth swelling, had to go to ER    Bee Sting [Sting, Bee] Swelling    Sulfa (Sulfonamide Antibiotics) Hives     Current Outpatient Medications   Medication Sig Dispense Refill    atenoloL (TENORMIN) 25 mg tablet Take 1 Tab by mouth two (2) times a day.  180 Tab 3    amLODIPine (NORVASC) 5 mg tablet       OTHER Indications: circulation and vein support      cholecalciferol (VITAMIN D3) 1,000 unit cap Take  by mouth daily.  trastuzumab (HERCEPTIN IV) by IntraVENous route.  omeprazole (PRILOSEC) 40 mg capsule TAKE ONE CAPSULE BY MOUTH TWICE DAILY (Patient taking differently: as needed.) 60 Cap 6    multivitamin (ONE A DAY) tablet Take 1 Tab by mouth daily.  aspirin delayed-release 81 mg tablet Take  by mouth daily.  gabapentin (NEURONTIN) 300 mg capsule Take 1 Cap by mouth nightly. Max Daily Amount: 300 mg. 30 Cap 2     Social History     Socioeconomic History    Marital status:      Spouse name: Not on file    Number of children: Not on file    Years of education: Not on file    Highest education level: Not on file   Tobacco Use    Smoking status: Never Smoker    Smokeless tobacco: Never Used   Substance and Sexual Activity    Alcohol use: Yes     Comment:  occasional    Drug use: Not Currently     Family History   Problem Relation Age of Onset    Heart Disease Mother     Hypertension Mother        Review of Systems  Constitutional:  negative for fevers, chills, anorexia and weight loss  Eyes:    negative for visual disturbance and irritation  ENT:    negative for tinnitus,sore throat,nasal congestion,ear pains. hoarseness  Respiratory:     negative for cough, hemoptysis, dyspnea,wheezing  CV:    negative for chest pain, palpitations, lower extremity edema  GI:    negative for nausea, vomiting, diarrhea, abdominal pain,melena  Endo:               negative for polyuria,polydipsia,polyphagia,heat intolerance  Genitourinary : negative for frequency, dysuria and hematuria  Integumentary: negative for rash and pruritus  Hematologic:   negative for easy bruising and gum/nose bleeding  Musculoskel:  negative for myalgias, arthralgias, back pain, muscle weakness, joint pain  Neurological:   negative for headaches, dizziness, vertigo, memory problems and gait   Behavl/Psych:  negative for feelings of anxiety, depression, mood changes  ROS otherwise negative      Objective:  Visit Vitals  /78   Pulse 79   Temp 98 °F (36.7 °C) (Temporal)   Resp 18   Ht 5' 3\" (1.6 m)   Wt 205 lb (93 kg)   SpO2 98%   BMI 36.31 kg/m²     Physical Exam:   General appearance - alert, well appearing, and in no distress  Mental status - alert, oriented to person, place, and time  EYE-ENE, EOMI, fundi normal, subconjunctival hemorrhage right eye (status post injection for macular degeneration), no foreign bodies  ENT-ENT exam normal, no neck nodes or sinus tenderness  Nose - normal and patent, no erythema, discharge or polyps  Mouth - mucous membranes moist, pharynx normal without lesions  Neck - supple, no significant adenopathy   Chest - clear to auscultation, no wheezes, rales or rhonchi, symmetric air entry   Heart - normal rate, regular rhythm, normal S1, S2, no murmurs, rubs, clicks or gallops   Abdomen - soft, nontender, nondistended, no masses or organomegaly  Lymph- no adenopathy palpable  Ext-peripheral pulses normal, no pedal edema, no clubbing or cyanosis  Skin-Warm and dry. no hyperpigmentation, vitiligo, or suspicious lesions  Neuro -alert, oriented, normal speech, no focal findings or movement disorder noted      Assessment/Plan:  Diagnoses and all orders for this visit:    1. HTN (hypertension), benign    2. 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 Osteopenia of multiple sites          ICD-10-CM ICD-9-CM    1. HTN (hypertension), benign  I10 401.1    2. 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 Osteopenia of multiple sites  M85.89 733.90      Plan:    Continue current medical regimen. I offered her a different medication for postherpetic neuralgia but she has declined. She will continue Tylenol extra strength as needed. She will call if she changes her mind. She will follow-up as scheduled in October.         I have reviewed with the patient details of the assessment and plan and all questions were answered. Relevent patient education was performed. Verbal and/or written instructions (see AVS) provided. The most recent lab findings were reviewed with the patient. Plan was discussed with patient who verbally expressed understanding. An After Visit Summary was printed and given to the patient.     Edy Marie MD

## 2021-05-27 ENCOUNTER — TRANSCRIBE ORDER (OUTPATIENT)
Dept: SCHEDULING | Age: 81
End: 2021-05-27

## 2021-05-27 DIAGNOSIS — T45.1X5A ANEMIA DUE TO ANTINEOPLASTIC CHEMOTHERAPY: ICD-10-CM

## 2021-05-27 DIAGNOSIS — C50.111 MALIGNANT NEOPLASM OF CENTRAL PORTION OF RIGHT FEMALE BREAST (HCC): Primary | ICD-10-CM

## 2021-05-27 DIAGNOSIS — D64.81 ANEMIA DUE TO ANTINEOPLASTIC CHEMOTHERAPY: ICD-10-CM

## 2021-05-27 DIAGNOSIS — R11.2 NAUSEA WITH VOMITING: ICD-10-CM

## 2021-05-27 DIAGNOSIS — Z79.899 OTHER LONG TERM (CURRENT) DRUG THERAPY: ICD-10-CM

## 2021-05-27 DIAGNOSIS — Z08 ENCOUNTER FOR FOLLOW-UP EXAMINATION AFTER COMPLETED TREATMENT FOR MALIGNANT NEOPLASM: ICD-10-CM

## 2021-06-22 ENCOUNTER — HOSPITAL ENCOUNTER (OUTPATIENT)
Dept: NON INVASIVE DIAGNOSTICS | Age: 81
Discharge: HOME | End: 2021-06-22
Attending: INTERNAL MEDICINE
Payer: MEDICARE

## 2021-06-22 VITALS
WEIGHT: 205.03 LBS | HEIGHT: 63 IN | BODY MASS INDEX: 36.33 KG/M2 | SYSTOLIC BLOOD PRESSURE: 128 MMHG | DIASTOLIC BLOOD PRESSURE: 78 MMHG

## 2021-06-22 DIAGNOSIS — D64.81 ANEMIA DUE TO ANTINEOPLASTIC CHEMOTHERAPY: ICD-10-CM

## 2021-06-22 DIAGNOSIS — Z08 ENCOUNTER FOR FOLLOW-UP EXAMINATION AFTER COMPLETED TREATMENT FOR MALIGNANT NEOPLASM: ICD-10-CM

## 2021-06-22 DIAGNOSIS — C50.111 MALIGNANT NEOPLASM OF CENTRAL PORTION OF RIGHT FEMALE BREAST (HCC): ICD-10-CM

## 2021-06-22 DIAGNOSIS — Z79.899 OTHER LONG TERM (CURRENT) DRUG THERAPY: ICD-10-CM

## 2021-06-22 DIAGNOSIS — R11.2 NAUSEA WITH VOMITING: ICD-10-CM

## 2021-06-22 DIAGNOSIS — T45.1X5A ANEMIA DUE TO ANTINEOPLASTIC CHEMOTHERAPY: ICD-10-CM

## 2021-06-22 LAB
AV R PG: 55.45 MMHG
ECHO AO ROOT DIAM: 2.53 CM
ECHO AR MAX VEL PISA: 372.33 CM/S
ECHO AV AREA PEAK VELOCITY: 2.49 CM2
ECHO AV AREA/BSA PEAK VELOCITY: 1.3 CM2/M2
ECHO AV CUSP MM: 1.66 CM
ECHO AV PEAK GRADIENT: 8.31 MMHG
ECHO AV PEAK VELOCITY: 144.12 CM/S
ECHO AV REGURGITANT PHT: 296.92 MS
ECHO EST RA PRESSURE: 3 MMHG
ECHO LA AREA 4C: 19.42 CM2
ECHO LA MAJOR AXIS: 3.5 CM
ECHO LA MINOR AXIS: 1.79 CM
ECHO LA TO AORTIC ROOT RATIO: 1.17
ECHO LA TO AORTIC ROOT RATIO: 1.17
ECHO LA VOL 2C: 72.38 ML (ref 22–52)
ECHO LA VOL 4C: 52.88 ML (ref 22–52)
ECHO LA VOL BP: 66.42 ML (ref 22–52)
ECHO LA VOL/BSA BIPLANE: 34.06 ML/M2 (ref 16–28)
ECHO LA VOLUME INDEX A2C: 37.12 ML/M2 (ref 16–28)
ECHO LA VOLUME INDEX A4C: 27.12 ML/M2 (ref 16–28)
ECHO LV E' LATERAL VELOCITY: 6.75 CM/S
ECHO LV E' SEPTAL VELOCITY: 6.48 CM/S
ECHO LV INTERNAL DIMENSION DIASTOLIC: 4.88 CM (ref 3.9–5.3)
ECHO LV INTERNAL DIMENSION SYSTOLIC: 3.63 CM
ECHO LV IVSD: 1.1 CM (ref 0.6–0.9)
ECHO LV IVSS: 1.76 CM
ECHO LV MASS 2D: 203 G (ref 67–162)
ECHO LV MASS INDEX 2D: 104.1 G/M2 (ref 43–95)
ECHO LV POSTERIOR WALL DIASTOLIC: 1.13 CM (ref 0.6–0.9)
ECHO LV POSTERIOR WALL SYSTOLIC: 1.79 CM
ECHO LVOT DIAM: 2 CM
ECHO LVOT PEAK GRADIENT: 5.21 MMHG
ECHO LVOT PEAK VELOCITY: 114.09 CM/S
ECHO MV A VELOCITY: 87.38 CM/S
ECHO MV E DECELERATION TIME (DT): 125.95 MS
ECHO MV E VELOCITY: 92.96 CM/S
ECHO MV E/A RATIO: 1.06
ECHO MV E/E' LATERAL: 13.77
ECHO MV E/E' RATIO (AVERAGED): 14.06
ECHO MV E/E' SEPTAL: 14.35
ECHO MV REGURGITANT VTIA: 178.98 CM
ECHO PV MAX VELOCITY: 82.23 CM/S
ECHO PV PEAK INSTANTANEOUS GRADIENT SYSTOLIC: 2.7 MMHG
ECHO PV REGURGITANT MAX VELOCITY: 61.94 CM/S
ECHO RIGHT VENTRICULAR SYSTOLIC PRESSURE (RVSP): 36.61 MMHG
ECHO RV INTERNAL DIMENSION: 3.94 CM
ECHO TV MEAN GRADIENT: 68.3 MMHG
ECHO TV REGURGITANT MAX VELOCITY: 289.56 CM/S
ECHO TV REGURGITANT MAX VELOCITY: 422.45 CM/S
ECHO TV REGURGITANT MAX VELOCITY: 464.41 CM/S
ECHO TV REGURGITANT PEAK GRADIENT: 33.61 MMHG

## 2021-06-22 PROCEDURE — 93306 TTE W/DOPPLER COMPLETE: CPT | Performed by: INTERNAL MEDICINE

## 2021-06-22 PROCEDURE — 93306 TTE W/DOPPLER COMPLETE: CPT

## 2021-09-07 ENCOUNTER — TRANSCRIBE ORDER (OUTPATIENT)
Dept: SCHEDULING | Age: 81
End: 2021-09-07

## 2021-09-07 DIAGNOSIS — D64.81 ANEMIA DUE TO ANTINEOPLASTIC CHEMOTHERAPY: ICD-10-CM

## 2021-09-07 DIAGNOSIS — R11.2 NAUSEA WITH VOMITING: ICD-10-CM

## 2021-09-07 DIAGNOSIS — Z08 ENCOUNTER FOR FOLLOW-UP EXAMINATION AFTER COMPLETED TREATMENT FOR MALIGNANT NEOPLASM: ICD-10-CM

## 2021-09-07 DIAGNOSIS — T45.1X5A ANEMIA DUE TO ANTINEOPLASTIC CHEMOTHERAPY: ICD-10-CM

## 2021-09-07 DIAGNOSIS — C50.111 MALIGNANT NEOPLASM OF CENTRAL PORTION OF RIGHT FEMALE BREAST (HCC): Primary | ICD-10-CM

## 2021-09-07 DIAGNOSIS — R19.7 DIARRHEA: ICD-10-CM

## 2021-09-11 ENCOUNTER — TRANSCRIBE ORDER (OUTPATIENT)
Dept: SCHEDULING | Age: 81
End: 2021-09-11

## 2021-09-12 ENCOUNTER — TRANSCRIBE ORDER (OUTPATIENT)
Dept: SCHEDULING | Age: 81
End: 2021-09-12

## 2021-09-13 ENCOUNTER — TRANSCRIBE ORDER (OUTPATIENT)
Dept: SCHEDULING | Age: 81
End: 2021-09-13

## 2021-09-13 DIAGNOSIS — R19.7 DIARRHEA: ICD-10-CM

## 2021-09-13 DIAGNOSIS — Z08 ENCOUNTER FOR FOLLOW-UP EXAMINATION AFTER TREATMENT FOR MALIGNANT NEOPLASM: ICD-10-CM

## 2021-09-13 DIAGNOSIS — T45.1X5A ANEMIA DUE TO ANTINEOPLASTIC CHEMOTHERAPY: ICD-10-CM

## 2021-09-13 DIAGNOSIS — D64.81 ANEMIA DUE TO ANTINEOPLASTIC CHEMOTHERAPY: ICD-10-CM

## 2021-09-13 DIAGNOSIS — R11.2 NAUSEA WITH VOMITING: ICD-10-CM

## 2021-09-13 DIAGNOSIS — C50.111 MALIGNANT NEOPLASM OF CENTRAL PORTION OF RIGHT FEMALE BREAST (HCC): Primary | ICD-10-CM

## 2021-09-16 ENCOUNTER — TRANSCRIBE ORDER (OUTPATIENT)
Dept: SCHEDULING | Age: 81
End: 2021-09-16

## 2021-09-16 DIAGNOSIS — Z01.810 ENCOUNTER FOR PREPROCEDURAL CARDIOVASCULAR EXAMINATION: Primary | ICD-10-CM

## 2021-10-05 ENCOUNTER — HOSPITAL ENCOUNTER (OUTPATIENT)
Dept: NON INVASIVE DIAGNOSTICS | Age: 81
Discharge: HOME | End: 2021-10-05
Attending: INTERNAL MEDICINE
Payer: MEDICARE

## 2021-10-05 VITALS
HEIGHT: 63 IN | SYSTOLIC BLOOD PRESSURE: 128 MMHG | WEIGHT: 205.03 LBS | BODY MASS INDEX: 36.33 KG/M2 | DIASTOLIC BLOOD PRESSURE: 78 MMHG

## 2021-10-05 DIAGNOSIS — Z01.810 ENCOUNTER FOR PREPROCEDURAL CARDIOVASCULAR EXAMINATION: ICD-10-CM

## 2021-10-05 LAB
ECHO AO ROOT DIAM: 3.11 CM
ECHO AV AREA PEAK VELOCITY: 1.66 CM2
ECHO AV AREA/BSA PEAK VELOCITY: 0.9 CM2/M2
ECHO AV MEAN GRADIENT: 6.54 MMHG
ECHO AV PEAK GRADIENT: 11.11 MMHG
ECHO AV PEAK VELOCITY: 166.65 CM/S
ECHO AV VTI: 40.75 CM
ECHO EST RA PRESSURE: 10 MMHG
ECHO LA AREA 4C: 18.25 CM2
ECHO LA MAJOR AXIS: 4.06 CM
ECHO LA MINOR AXIS: 2.08 CM
ECHO LA VOL 4C: 48.84 ML (ref 22–52)
ECHO LA VOLUME INDEX A4C: 25.05 ML/M2 (ref 16–28)
ECHO LV E' LATERAL VELOCITY: 9.93 CM/S
ECHO LV E' SEPTAL VELOCITY: 7.74 CM/S
ECHO LV INTERNAL DIMENSION DIASTOLIC: 4.91 CM (ref 3.9–5.3)
ECHO LV INTERNAL DIMENSION SYSTOLIC: 3.51 CM
ECHO LV IVSD: 0.83 CM (ref 0.6–0.9)
ECHO LV MASS 2D: 139.1 G (ref 67–162)
ECHO LV MASS INDEX 2D: 71.4 G/M2 (ref 43–95)
ECHO LV POSTERIOR WALL DIASTOLIC: 0.84 CM (ref 0.6–0.9)
ECHO LVOT DIAM: 1.83 CM
ECHO LVOT PEAK GRADIENT: 4.48 MMHG
ECHO LVOT PEAK VELOCITY: 105.87 CM/S
ECHO MV A VELOCITY: 113.42 CM/S
ECHO MV E DECELERATION TIME (DT): 145.42 MS
ECHO MV E VELOCITY: 125.05 CM/S
ECHO MV E/A RATIO: 1.1
ECHO MV E/E' LATERAL: 12.59
ECHO MV E/E' RATIO (AVERAGED): 14.37
ECHO MV E/E' SEPTAL: 16.16
ECHO MV MAX VELOCITY: 132.92 CM/S
ECHO MV MEAN GRADIENT: 3.19 MMHG
ECHO MV PEAK GRADIENT: 7.07 MMHG
ECHO MV VTI: 33.94 CM
ECHO PV MAX VELOCITY: 106.88 CM/S
ECHO PV PEAK INSTANTANEOUS GRADIENT SYSTOLIC: 4.57 MMHG
ECHO RIGHT VENTRICULAR SYSTOLIC PRESSURE (RVSP): 52.19 MMHG
ECHO TV REGURGITANT MAX VELOCITY: 323.74 CM/S
ECHO TV REGURGITANT MAX VELOCITY: 584.38 CM/S
ECHO TV REGURGITANT PEAK GRADIENT: 42.19 MMHG

## 2021-10-05 PROCEDURE — 93306 TTE W/DOPPLER COMPLETE: CPT

## 2021-10-05 PROCEDURE — 93306 TTE W/DOPPLER COMPLETE: CPT | Performed by: INTERNAL MEDICINE

## 2021-10-21 ENCOUNTER — OFFICE VISIT (OUTPATIENT)
Dept: INTERNAL MEDICINE CLINIC | Age: 81
End: 2021-10-21
Payer: MEDICARE

## 2021-10-21 VITALS
BODY MASS INDEX: 36.32 KG/M2 | DIASTOLIC BLOOD PRESSURE: 82 MMHG | HEIGHT: 63 IN | SYSTOLIC BLOOD PRESSURE: 138 MMHG | RESPIRATION RATE: 18 BRPM | OXYGEN SATURATION: 94 % | WEIGHT: 205 LBS | HEART RATE: 70 BPM

## 2021-10-21 DIAGNOSIS — C78.00 CARCINOMA OF RIGHT BREAST METASTATIC TO LUNG (HCC): ICD-10-CM

## 2021-10-21 DIAGNOSIS — Z00.00 MEDICARE ANNUAL WELLNESS VISIT, SUBSEQUENT: Primary | ICD-10-CM

## 2021-10-21 DIAGNOSIS — E78.5 HYPERLIPIDEMIA, ACQUIRED: ICD-10-CM

## 2021-10-21 DIAGNOSIS — Z23 ENCOUNTER FOR IMMUNIZATION: ICD-10-CM

## 2021-10-21 DIAGNOSIS — Z13.31 SCREENING FOR DEPRESSION: ICD-10-CM

## 2021-10-21 DIAGNOSIS — Z13.1 SCREENING FOR DIABETES MELLITUS: ICD-10-CM

## 2021-10-21 DIAGNOSIS — I10 HTN (HYPERTENSION), BENIGN: ICD-10-CM

## 2021-10-21 DIAGNOSIS — H35.3231 EXUDATIVE AGE-RELATED MACULAR DEGENERATION OF BOTH EYES WITH ACTIVE CHOROIDAL NEOVASCULARIZATION (HCC): ICD-10-CM

## 2021-10-21 DIAGNOSIS — C50.911 CARCINOMA OF RIGHT BREAST METASTATIC TO LUNG (HCC): ICD-10-CM

## 2021-10-21 DIAGNOSIS — E78.00 PURE HYPERCHOLESTEROLEMIA: ICD-10-CM

## 2021-10-21 PROCEDURE — G8536 NO DOC ELDER MAL SCRN: HCPCS | Performed by: INTERNAL MEDICINE

## 2021-10-21 PROCEDURE — 1101F PT FALLS ASSESS-DOCD LE1/YR: CPT | Performed by: INTERNAL MEDICINE

## 2021-10-21 PROCEDURE — G8417 CALC BMI ABV UP PARAM F/U: HCPCS | Performed by: INTERNAL MEDICINE

## 2021-10-21 PROCEDURE — G0008 ADMIN INFLUENZA VIRUS VAC: HCPCS | Performed by: INTERNAL MEDICINE

## 2021-10-21 PROCEDURE — G8399 PT W/DXA RESULTS DOCUMENT: HCPCS | Performed by: INTERNAL MEDICINE

## 2021-10-21 PROCEDURE — G8427 DOCREV CUR MEDS BY ELIG CLIN: HCPCS | Performed by: INTERNAL MEDICINE

## 2021-10-21 PROCEDURE — G8752 SYS BP LESS 140: HCPCS | Performed by: INTERNAL MEDICINE

## 2021-10-21 PROCEDURE — 90686 IIV4 VACC NO PRSV 0.5 ML IM: CPT | Performed by: INTERNAL MEDICINE

## 2021-10-21 PROCEDURE — G0439 PPPS, SUBSEQ VISIT: HCPCS | Performed by: INTERNAL MEDICINE

## 2021-10-21 PROCEDURE — G8510 SCR DEP NEG, NO PLAN REQD: HCPCS | Performed by: INTERNAL MEDICINE

## 2021-10-21 PROCEDURE — G8754 DIAS BP LESS 90: HCPCS | Performed by: INTERNAL MEDICINE

## 2021-10-21 NOTE — PROGRESS NOTES
1. Have you been to the ER, urgent care clinic since your last visit? Hospitalized since your last visit? No    2. Have you seen or consulted any other health care providers outside of the [address] [place] [address] since your last visit? Include any pap smears or colon screening.  No

## 2021-10-21 NOTE — PATIENT INSTRUCTIONS
Medicare Wellness Visit, Female     The best way to live healthy is to have a lifestyle where you eat a well-balanced diet, exercise regularly, limit alcohol use, and quit all forms of tobacco/nicotine, if applicable. Regular preventive services are another way to keep healthy. Preventive services (vaccines, screening tests, monitoring & exams) can help personalize your care plan, which helps you manage your own care. Screening tests can find health problems at the earliest stages, when they are easiest to treat. [facility] follows the current, evidence-based guidelines published by the [facility] Cornelio Cali ([facility]) when recommending preventive services for our patients. Because we follow these guidelines, sometimes recommendations change over time as research supports it. (For example, mammograms used to be recommended annually. Even though Medicare will still pay for an annual mammogram, the newer guidelines recommend a mammogram every two years for women of average risk). Of course, you and your doctor may decide to screen more often for some diseases, based on your risk and your co-morbidities (chronic disease you are already diagnosed with). Preventive services for you include:  - Medicare offers their members a free annual wellness visit, which is time for you and your primary care provider to discuss and plan for your preventive service needs. Take advantage of this benefit every year!  -All adults over the age of 72 should receive the recommended pneumonia vaccines. Current USPSTF guidelines recommend a series of two vaccines for the best pneumonia protection.   -All adults should have a flu vaccine yearly and a tetanus vaccine every 10 years.   -All adults age 48 and older should receive the shingles vaccines (series of two vaccines).       -All adults age 38-68 who are overweight should have a diabetes screening test once every three years.   -All adults born between [birth year] and 1965 should be screened once for Hepatitis C.  -Other screening tests and preventive services for persons with diabetes include: an eye exam to screen for diabetic retinopathy, a kidney function test, a foot exam, and stricter control over your cholesterol.   -Cardiovascular screening for adults with routine risk involves an electrocardiogram (ECG) at intervals determined by your doctor.   -Colorectal cancer screenings should be done for adults age 54-65 with no increased risk factors for colorectal cancer. There are a number of acceptable methods of screening for this type of cancer. Each test has its own benefits and drawbacks. Discuss with your doctor what is most appropriate for you during your annual wellness visit. The different tests include: colonoscopy (considered the best screening method), a fecal occult blood test, a fecal DNA test, and sigmoidoscopy.    -A bone mass density test is recommended when a woman turns 65 to screen for osteoporosis. This test is only recommended one time, as a screening. Some providers will use this same test as a disease monitoring tool if you already have osteoporosis. -Breast cancer screenings are recommended every other year for women of normal risk, age 54-69.  -Cervical cancer screenings for women over age 72 are only recommended with certain risk factors.      Here is a list of your current Health Maintenance items (your personalized list of preventive services) with a due date:  Health Maintenance Due   Topic Date Due    Shingles Vaccine (1 of 2) Never done    Pneumococcal Vaccine 65+ years at Risk (1 of 2 - PCV13) Never done    COVID-19 Vaccine (3 - Pfizer risk 3-dose series) 04/19/2021    Yearly Flu Vaccine (1) 09/01/2021

## 2021-10-21 NOTE — PROGRESS NOTES
This is the Subsequent Medicare Annual Wellness Exam, performed 12 months or more after the Initial AWV or the last Subsequent AWV    I have reviewed the patient's medical history in detail and updated the computerized patient record. Assessment/Plan   Education and counseling provided:  Are appropriate based on today's review and evaluation    1. Medicare annual wellness visit, subsequent  2. HTN (hypertension), benign  3. Exudative age-related macular degeneration of both eyes with active choroidal neovascularization ([facility]  4. Pure hypercholesterolemia  5. Hyperlipidemia, acquired  6. Carcinoma of right breast metastatic to lung ([facility]  7. Body mass index 36.0-36.9, adult  8. Screening for diabetes mellitus  9. Screening for depression  -     DEPRESSION SCREEN ANNUAL       Depression Risk Factor Screening     3 most recent PHQ Screens 10/21/2021   Little interest or pleasure in doing things Not at all   Feeling down, depressed, irritable, or hopeless Not at all   Total Score PHQ 2 0       Alcohol Risk Screen    Do you average more than 1 drink per night or more than 7 drinks a week:  No    On any one occasion in the past three months have you have had more than 3 drinks containing alcohol:  No        Functional Ability and Level of Safety    Hearing: Hearing is good. Activities of Daily Living: The home contains: no safety equipment. Patient does total self care      Ambulation: with no difficulty     Fall Risk:  Fall Risk Assessment, last 12 mths 1/29/2021   Able to walk? Yes   Fall in past 12 months? 0   Do you feel unsteady? 0   Are you worried about falling 0   Number of falls in past 12 months -   Fall with injury?  -      Abuse Screen:  Patient is not abused       Cognitive Screening    Has your family/caregiver stated any concerns about your memory: no     Cognitive Screening: Normal - Verbal Fluency Test    Health Maintenance Due     Health Maintenance Due   Topic Date Due    Shingrix Vaccine Age 49> (1 of 2) Never done    Pneumococcal 65+ yrs at Risk Vaccine (1 of 2 - PCV13) Never done    COVID-19 Vaccine (3 - Pfizer risk 3-dose series) 04/19/2021    Flu Vaccine (1) 09/01/2021       Patient Care Team   Patient Care Team:  Keanu Martins MD as PCP - General (Internal Medicine)  Keanu Martins MD as PCP - [address] [place] [address] Provider  Monique Boo MD as Physician (Hematology and Oncology)    History     Patient Active Problem List   Diagnosis Code    Inversion, nipple right N64.59    Carcinoma of right breast metastatic to lung [facility]) C50.911, C78.00    Acute upper GI bleed K92.2    Acute blood loss anemia D62    Nausea & vomiting R11.2    Pre-syncope R55    Hypovolemia E86.1    Anemia D64.9    Breast cancer, right ([address] C50.911    Cellulitis L03.90    Duodenal ulcer K26.9    Fatigue R53.83    History of total knee replacement Z96.659    History of uterine fibroid Z86.018    Hyperlipidemia, acquired E78.5    Neuralgia M79.2    Nipple retraction N64.53    Post-menopausal Z78.0    Sialoadenitis K11.20    HTN (hypertension), benign I10    Severe obesity ([address] E66.01    Pure hypercholesterolemia E78.00    Osteopenia of multiple sites M85.89    Exudative age-related macular degeneration of both eyes with active choroidal neovascularization ([address] Q44.5731     Past Medical History:   Diagnosis Date    Breast cancer, right ([address] Dx 4/12/16    Dr Remy Florez    Exudative age-related macular degeneration of both eyes with active choroidal neovascularization ([address] 1/29/2021    HTN (hypertension), benign 9/22/2017    Hypertension     Motion sickness     Nausea & vomiting     Osteopenia of multiple sites 1/29/2021    Pure hypercholesterolemia 1/29/2021      Past Surgical History:   Procedure Laterality Date    HX CATARACT REMOVAL Bilateral 1997 2005    HX ORTHOPAEDIC Left 2008    part knee replacement    UPPER GI ENDOSCOPY,BIOPSY  6/20/2016         UPPER GI ENDOSCOPY,CTRL BLEED  6/20/2016          Current Outpatient Medications   Medication Sig Dispense Refill    amLODIPine (NORVASC) 5 mg tablet Take 1 Tab by mouth daily. 90 Tab 3    atenoloL (TENORMIN) 25 mg tablet Take 1 Tab by mouth two (2) times a day. 180 Tab 3    OTHER Indications: circulation and vein support      cholecalciferol (VITAMIN D3) 1,000 unit cap Take  by mouth daily.  trastuzumab (HERCEPTIN IV) by IntraVENous route.  omeprazole (PRILOSEC) 40 mg capsule TAKE ONE CAPSULE BY MOUTH TWICE DAILY (Patient taking differently: as needed.) 60 Cap 6    multivitamin (ONE A DAY) tablet Take 1 Tab by mouth daily.  aspirin delayed-release 81 mg tablet Take  by mouth daily. Allergies   Allergen Reactions    [place] Cough Drops [Menthol] Swelling     Mouth swelling, had to go to ER    Bee Sting [Sting, Bee] Swelling    Sulfa (Sulfonamide Antibiotics) Hives       Family History   Problem Relation Age of Onset    Heart Disease Mother     Hypertension Mother      Social History     Tobacco Use    Smoking status: Never Smoker    Smokeless tobacco: Never Used   Substance Use Topics    Alcohol use: Yes     Comment:  occasional         C Hiram Guzman MD     .      Denise Trujillo is a [de-identified] y.o. female and presents with Annual Exam  .    Subjective:  Mrs. Andres presents today for a Medicare annual wellness review as well as follow-up of hypertension. She had labs done with her oncologist which she shares with me today and are stable. She has a history of a minimally elevated cholesterol with an excellent HDL cholesterol. She is being treated for metastatic breast cancer and has been stable on her current regimen. She has a follow-up echocardiogram and CT scan done periodically. She is feeling well in general and without significant complaint today. She does have decreased vision per her right eye secondary to macular degeneration.   She had a bout of shingles earlier in the year and notes that her postherpetic neuralgia has improved significantly. She denies shortness of breath, chest pain, palpitations, PND, orthopnea, or pedal edema. Past Medical History:   Diagnosis Date    Breast cancer, right ([address] Dx 4/12/16    Dr Kavya Baker    Exudative age-related macular degeneration of both eyes with active choroidal neovascularization ([address] 1/29/2021    HTN (hypertension), benign 9/22/2017    Hypertension     Motion sickness     Nausea & vomiting     Osteopenia of multiple sites 1/29/2021    Pure hypercholesterolemia 1/29/2021     Past Surgical History:   Procedure Laterality Date    HX CATARACT REMOVAL Bilateral 1997 2005    HX ORTHOPAEDIC Left 2008    part knee replacement    UPPER GI ENDOSCOPY,BIOPSY  6/20/2016         UPPER GI ENDOSCOPY,CTRL BLEED  6/20/2016          Allergies   Allergen Reactions    [place] Cough Drops [Menthol] Swelling     Mouth swelling, had to go to ER    Bee Sting [Sting, Bee] Swelling    Sulfa (Sulfonamide Antibiotics) Hives     Current Outpatient Medications   Medication Sig Dispense Refill    amLODIPine (NORVASC) 5 mg tablet Take 1 Tab by mouth daily. 90 Tab 3    atenoloL (TENORMIN) 25 mg tablet Take 1 Tab by mouth two (2) times a day. 180 Tab 3    OTHER Indications: circulation and vein support      cholecalciferol (VITAMIN D3) 1,000 unit cap Take  by mouth daily.  trastuzumab (HERCEPTIN IV) by IntraVENous route.  omeprazole (PRILOSEC) 40 mg capsule TAKE ONE CAPSULE BY MOUTH TWICE DAILY (Patient taking differently: as needed.) 60 Cap 6    multivitamin (ONE A DAY) tablet Take 1 Tab by mouth daily.  aspirin delayed-release 81 mg tablet Take  by mouth daily.        Social History     Socioeconomic History    Marital status:      Spouse name: Not on file    Number of children: Not on file    Years of education: Not on file    Highest education level: Not on file   Tobacco Use    Smoking status: Never Smoker    Smokeless tobacco: Never Used Vaping Use    Vaping Use: Never used   Substance and Sexual Activity    Alcohol use: Yes     Comment:  occasional    Drug use: Not Currently     Social Determinants of Health     Financial Resource Strain:     Difficulty of Paying Living Expenses:    Food Insecurity:     Worried About Running Out of Food in the Last Year:     [address] in the Last Year:    Transportation Needs:     Lack of Transportation (Medical):  Lack of Transportation (Non-Medical):    Physical Activity:     Days of Exercise per Week:     Minutes of Exercise per Session:    Stress:     Feeling of Stress :    Social Connections:     Frequency of Communication with Friends and Family:     Frequency of Social Gatherings with Friends and Family:     Attends Zoro[employer] Services:     Active Member of Clubs or Organizations:     Attends Club or Organization Meetings:     Marital Status:      Family History   Problem Relation Age of Onset    Heart Disease Mother     Hypertension Mother        Review of Systems  Constitutional:  negative for fevers, chills, anorexia and weight loss  Eyes:    negative for visual disturbance and irritation  ENT:    negative for tinnitus,sore throat,nasal congestion,ear pains. hoarseness  Respiratory:     negative for cough, hemoptysis, dyspnea,wheezing  CV:    negative for chest pain, palpitations, lower extremity edema  GI:    negative for nausea, vomiting, diarrhea, abdominal pain,melena  Endo:               negative for polyuria,polydipsia,polyphagia,heat intolerance  Genitourinary : negative for frequency, dysuria and hematuria  Integumentary: negative for rash and pruritus  Hematologic:   negative for easy bruising and gum/nose bleeding  Musculoskel:  negative for myalgias, arthralgias, back pain, muscle weakness, joint pain  Neurological:   negative for headaches, dizziness, vertigo, memory problems and gait   Behavl/Psych:  negative for feelings of anxiety, depression, mood changes  ROS otherwise negative      Objective:  Visit Vitals  /82 (BP 1 Location: Left arm, BP Patient Position: Sitting, BP Cuff Size: Large adult)   Pulse 70   Resp 18   Ht 5' 3\" (1.6 m)   Wt 205 lb (93 kg)   SpO2 94%   BMI 36.31 kg/m²     Physical Exam:   General appearance - alert, well appearing, and in no distress  Mental status - alert, oriented to person, place, and time  EYE-ENE, EOMI, fundi normal, corneas normal, no foreign bodies  ENT-ENT exam normal, no neck nodes or sinus tenderness  Nose - normal and patent, no erythema, discharge or polyps  Mouth - mucous membranes moist, pharynx normal without lesions  Neck - supple, no significant adenopathy   Chest - clear to auscultation, no wheezes, rales or rhonchi, symmetric air entry   Heart - normal rate, regular rhythm, normal S1, S2, no murmurs, rubs, clicks or gallops   Abdomen - soft, nontender, nondistended, no masses or organomegaly  Lymph- no adenopathy palpable  Ext-peripheral pulses normal, no pedal edema, no clubbing or cyanosis  Skin-Warm and dry. no hyperpigmentation, vitiligo, or suspicious lesions  Neuro -alert, oriented, normal speech, no focal findings or movement disorder noted      Assessment/Plan:  Diagnoses and all orders for this visit:    1. Medicare annual wellness visit, subsequent    2. HTN (hypertension), benign    3. Exudative age-related macular degeneration of both eyes with active choroidal neovascularization ([address]    4. Pure hypercholesterolemia    5. Hyperlipidemia, acquired    6. Carcinoma of right breast metastatic to lung ([address]    7. Body mass index 36.0-36.9, adult    8. Screening for diabetes mellitus    9. Screening for depression  -     [facility]          ICD-10-CM ICD-9-CM    1. Medicare annual wellness visit, subsequent  Z00.00 V70.0    2. HTN (hypertension), benign  I10 401.1    3.  Exudative age-related macular degeneration of both eyes with active choroidal neovascularization ([address]  H35.3231 362.52 362.16    4. Pure hypercholesterolemia  E78.00 272.0    5. Hyperlipidemia, acquired  E78.5 272.4    6. Carcinoma of right breast metastatic to lung (HCC)  C50.911 174.9     C78.00 197.0    7. Body mass index 36.0-36.9, adult  Z68.36 V85.36    8. Screening for diabetes mellitus  Z13.1 V77.1    9. Screening for depression  Z13.31 V79.0 DEPRESSION SCREEN ANNUAL     Plan:    Hypertension well-controlled on current regimen. She only takes omeprazole on an as-needed basis. I have recommended Senokot over-the-counter for constipation. She will continue amlodipine and atenolol for hypertension. She will be given a flu vaccine today. I have reviewed with the patient details of the assessment and plan and all questions were answered. Relevent patient education was performed. Verbal and/or written instructions (see AVS) provided. The most recent lab findings were reviewed with the patient. Plan was discussed with patient who verbally expressed understanding. An After Visit Summary was printed and given to the patient.     Lexis Watts MD

## 2021-11-17 DIAGNOSIS — Z13.31 SCREENING FOR DEPRESSION: ICD-10-CM

## 2021-11-17 DIAGNOSIS — C50.911 CARCINOMA OF RIGHT BREAST METASTATIC TO LUNG (HCC): ICD-10-CM

## 2021-11-17 DIAGNOSIS — C78.00 CARCINOMA OF RIGHT BREAST METASTATIC TO LUNG (HCC): ICD-10-CM

## 2021-11-17 DIAGNOSIS — Z23 ENCOUNTER FOR IMMUNIZATION: ICD-10-CM

## 2021-11-17 DIAGNOSIS — Z00.00 MEDICARE ANNUAL WELLNESS VISIT, SUBSEQUENT: ICD-10-CM

## 2021-11-17 DIAGNOSIS — I10 ESSENTIAL HYPERTENSION: ICD-10-CM

## 2021-11-17 DIAGNOSIS — Z13.6 SCREENING FOR ISCHEMIC HEART DISEASE: ICD-10-CM

## 2021-11-18 RX ORDER — ATENOLOL 25 MG/1
TABLET ORAL
Qty: 180 TABLET | Refills: 3 | Status: SHIPPED | OUTPATIENT
Start: 2021-11-18 | End: 2022-09-19

## 2021-12-07 RX ORDER — OMEPRAZOLE 40 MG/1
CAPSULE, DELAYED RELEASE ORAL
Qty: 60 CAPSULE | Refills: 0 | Status: SHIPPED | OUTPATIENT
Start: 2021-12-07 | End: 2022-03-11

## 2021-12-22 ENCOUNTER — TRANSCRIBE ORDER (OUTPATIENT)
Dept: SCHEDULING | Age: 81
End: 2021-12-22

## 2021-12-22 DIAGNOSIS — R11.2 NAUSEA WITH VOMITING: ICD-10-CM

## 2021-12-22 DIAGNOSIS — R19.7 DIARRHEA: ICD-10-CM

## 2021-12-22 DIAGNOSIS — Z79.899 NEED FOR PROPHYLACTIC CHEMOTHERAPY: ICD-10-CM

## 2021-12-22 DIAGNOSIS — Z08 ENCOUNTER FOR FOLLOW-UP EXAMINATION AFTER COMPLETED TREATMENT FOR MALIGNANT NEOPLASM: ICD-10-CM

## 2021-12-22 DIAGNOSIS — T45.1X5A ANEMIA DUE TO CHEMOTHERAPY: ICD-10-CM

## 2021-12-22 DIAGNOSIS — C50.111 MALIGNANT NEOPLASM OF CENTRAL PORTION OF RIGHT FEMALE BREAST (HCC): Primary | ICD-10-CM

## 2021-12-22 DIAGNOSIS — Z01.810 ENCOUNTER FOR PREPROCEDURAL CARDIOVASCULAR EXAMINATION: ICD-10-CM

## 2021-12-22 DIAGNOSIS — D64.81 ANEMIA DUE TO CHEMOTHERAPY: ICD-10-CM

## 2022-01-18 ENCOUNTER — HOSPITAL ENCOUNTER (OUTPATIENT)
Dept: NON INVASIVE DIAGNOSTICS | Age: 82
Discharge: HOME | End: 2022-01-18
Attending: INTERNAL MEDICINE
Payer: MEDICARE

## 2022-01-18 VITALS
BODY MASS INDEX: 36.33 KG/M2 | DIASTOLIC BLOOD PRESSURE: 82 MMHG | HEIGHT: 63 IN | WEIGHT: 205.03 LBS | SYSTOLIC BLOOD PRESSURE: 138 MMHG

## 2022-01-18 DIAGNOSIS — T45.1X5A ANEMIA DUE TO CHEMOTHERAPY: ICD-10-CM

## 2022-01-18 DIAGNOSIS — R19.7 DIARRHEA: ICD-10-CM

## 2022-01-18 DIAGNOSIS — R11.2 NAUSEA WITH VOMITING: ICD-10-CM

## 2022-01-18 DIAGNOSIS — Z79.899 NEED FOR PROPHYLACTIC CHEMOTHERAPY: ICD-10-CM

## 2022-01-18 DIAGNOSIS — Z08 ENCOUNTER FOR FOLLOW-UP EXAMINATION AFTER COMPLETED TREATMENT FOR MALIGNANT NEOPLASM: ICD-10-CM

## 2022-01-18 DIAGNOSIS — D64.81 ANEMIA DUE TO CHEMOTHERAPY: ICD-10-CM

## 2022-01-18 DIAGNOSIS — Z01.810 ENCOUNTER FOR PREPROCEDURAL CARDIOVASCULAR EXAMINATION: ICD-10-CM

## 2022-01-18 DIAGNOSIS — C50.111 MALIGNANT NEOPLASM OF CENTRAL PORTION OF RIGHT FEMALE BREAST (HCC): ICD-10-CM

## 2022-01-18 LAB
ECHO AO ROOT DIAM: 2.8 CM
ECHO AO ROOT INDEX: 1.44 CM/M2
ECHO AV AREA PEAK VELOCITY: 2.1 CM2
ECHO AV AREA PEAK VELOCITY: 2.1 CM2
ECHO AV MEAN GRADIENT: 6 MMHG
ECHO AV MEAN VELOCITY: 1.2 M/S
ECHO AV PEAK GRADIENT: 10 MMHG
ECHO AV PEAK VELOCITY: 1.6 M/S
ECHO AV VELOCITY RATIO: 0.94
ECHO AV VTI: 38.6 CM
ECHO EST RA PRESSURE: 10 MMHG
ECHO LA DIAMETER INDEX: 2.26 CM/M2
ECHO LA DIAMETER: 4.4 CM
ECHO LA TO AORTIC ROOT RATIO: 1.57
ECHO LA VOL 4C: 68 ML (ref 22–52)
ECHO LA VOLUME INDEX A4C: 35 ML/M2 (ref 16–34)
ECHO LV E' LATERAL VELOCITY: 8 CM/S
ECHO LV E' SEPTAL VELOCITY: 9 CM/S
ECHO LV EDV A4C: 63 ML
ECHO LV EDV INDEX A4C: 32 ML/M2
ECHO LV EJECTION FRACTION A4C: 51 %
ECHO LV ESV A4C: 31 ML
ECHO LV ESV INDEX A4C: 16 ML/M2
ECHO LV FRACTIONAL SHORTENING: 28 % (ref 28–44)
ECHO LV INTERNAL DIMENSION DIASTOLE INDEX: 2.36 CM/M2
ECHO LV INTERNAL DIMENSION DIASTOLIC: 4.6 CM (ref 3.9–5.3)
ECHO LV INTERNAL DIMENSION SYSTOLIC INDEX: 1.69 CM/M2
ECHO LV INTERNAL DIMENSION SYSTOLIC: 3.3 CM
ECHO LV IVSD: 0.8 CM (ref 0.6–0.9)
ECHO LV MASS 2D: 108.5 G (ref 67–162)
ECHO LV MASS INDEX 2D: 55.6 G/M2 (ref 43–95)
ECHO LV POSTERIOR WALL DIASTOLIC: 0.7 CM (ref 0.6–0.9)
ECHO LV RELATIVE WALL THICKNESS RATIO: 0.3
ECHO LVOT AREA: 2.3 CM2
ECHO LVOT DIAM: 1.7 CM
ECHO LVOT PEAK GRADIENT: 10 MMHG
ECHO LVOT PEAK VELOCITY: 1.5 M/S
ECHO MV A VELOCITY: 1.15 M/S
ECHO MV E DECELERATION TIME (DT): 156.2 MS
ECHO MV E VELOCITY: 1.4 M/S
ECHO MV E/A RATIO: 1.22
ECHO MV E/E' LATERAL: 17.5
ECHO MV E/E' RATIO (AVERAGED): 16.53
ECHO MV E/E' SEPTAL: 15.56
ECHO MV MAX VELOCITY: 1.4 M/S
ECHO MV MEAN GRADIENT: 4 MMHG
ECHO MV MEAN VELOCITY: 0.9 M/S
ECHO MV PEAK GRADIENT: 7 MMHG
ECHO MV REGURGITANT ALIASING (NYQUIST) VELOCITY: 35 CM/S
ECHO MV REGURGITANT PEAK GRADIENT: 139 MMHG
ECHO MV REGURGITANT PEAK VELOCITY: 5.9 M/S
ECHO MV REGURGITANT VELOCITY PISA: 5.4 M/S
ECHO MV REGURGITANT VTIA: 198 CM
ECHO MV VTI: 37 CM
ECHO PV MAX VELOCITY: 1.1 M/S
ECHO PV PEAK GRADIENT: 5 MMHG
ECHO RIGHT VENTRICULAR SYSTOLIC PRESSURE (RVSP): 57 MMHG
ECHO TV REGURGITANT MAX VELOCITY: 3.42 M/S
ECHO TV REGURGITANT PEAK GRADIENT: 47 MMHG

## 2022-01-18 PROCEDURE — 93306 TTE W/DOPPLER COMPLETE: CPT | Performed by: INTERNAL MEDICINE

## 2022-01-18 PROCEDURE — 93306 TTE W/DOPPLER COMPLETE: CPT

## 2022-03-03 RX ORDER — AMLODIPINE BESYLATE 5 MG/1
TABLET ORAL
Qty: 90 TABLET | Refills: 3 | Status: SHIPPED | OUTPATIENT
Start: 2022-03-03

## 2022-03-18 PROBLEM — E66.01 SEVERE OBESITY (HCC): Status: ACTIVE | Noted: 2019-10-14

## 2022-03-18 PROBLEM — C50.911 BREAST CANCER, RIGHT (HCC): Status: ACTIVE | Noted: 2017-08-26

## 2022-03-18 PROBLEM — D64.9 ANEMIA: Status: ACTIVE | Noted: 2017-08-26

## 2022-03-19 PROBLEM — L03.90 CELLULITIS: Status: ACTIVE | Noted: 2017-08-26

## 2022-03-19 PROBLEM — E78.5 HYPERLIPIDEMIA, ACQUIRED: Status: ACTIVE | Noted: 2017-08-26

## 2022-03-19 PROBLEM — M79.2 NEURALGIA: Status: ACTIVE | Noted: 2017-08-26

## 2022-03-19 PROBLEM — I10 HTN (HYPERTENSION), BENIGN: Status: ACTIVE | Noted: 2017-09-22

## 2022-03-19 PROBLEM — Z86.018 HISTORY OF UTERINE FIBROID: Status: ACTIVE | Noted: 2017-08-26

## 2022-03-19 PROBLEM — K26.9 DUODENAL ULCER: Status: ACTIVE | Noted: 2017-08-26

## 2022-03-19 PROBLEM — M85.89 OSTEOPENIA OF MULTIPLE SITES: Status: ACTIVE | Noted: 2021-01-29

## 2022-03-19 PROBLEM — Z78.0 POST-MENOPAUSAL: Status: ACTIVE | Noted: 2017-08-26

## 2022-03-19 PROBLEM — H35.3231 EXUDATIVE AGE-RELATED MACULAR DEGENERATION OF BOTH EYES WITH ACTIVE CHOROIDAL NEOVASCULARIZATION (HCC): Status: ACTIVE | Noted: 2021-01-29

## 2022-03-19 PROBLEM — R53.83 FATIGUE: Status: ACTIVE | Noted: 2017-08-26

## 2022-03-19 PROBLEM — K11.20 SIALOADENITIS: Status: ACTIVE | Noted: 2017-08-26

## 2022-03-19 PROBLEM — Z96.659 HISTORY OF TOTAL KNEE REPLACEMENT: Status: ACTIVE | Noted: 2017-08-26

## 2022-03-20 PROBLEM — N64.53 NIPPLE RETRACTION: Status: ACTIVE | Noted: 2017-08-26

## 2022-03-20 PROBLEM — E78.00 PURE HYPERCHOLESTEROLEMIA: Status: ACTIVE | Noted: 2021-01-29

## 2022-03-30 ENCOUNTER — TRANSCRIBE ORDER (OUTPATIENT)
Dept: SCHEDULING | Age: 82
End: 2022-03-30

## 2022-03-30 DIAGNOSIS — Z01.810 ENCOUNTER FOR PREPROCEDURAL CARDIOVASCULAR EXAMINATION: Primary | ICD-10-CM

## 2022-03-30 DIAGNOSIS — Z08 ENCNTR FOR FOLLOW-UP EXAM AFTER TRTMT FOR MALIGNANT NEOPLASM: ICD-10-CM

## 2022-04-28 ENCOUNTER — OFFICE VISIT (OUTPATIENT)
Dept: INTERNAL MEDICINE CLINIC | Age: 82
End: 2022-04-28
Payer: MEDICARE

## 2022-04-28 VITALS
HEART RATE: 71 BPM | TEMPERATURE: 97.9 F | HEIGHT: 63 IN | RESPIRATION RATE: 18 BRPM | WEIGHT: 199.4 LBS | DIASTOLIC BLOOD PRESSURE: 72 MMHG | SYSTOLIC BLOOD PRESSURE: 132 MMHG | BODY MASS INDEX: 35.33 KG/M2 | OXYGEN SATURATION: 99 %

## 2022-04-28 DIAGNOSIS — R73.02 IGT (IMPAIRED GLUCOSE TOLERANCE): ICD-10-CM

## 2022-04-28 DIAGNOSIS — M85.89 OSTEOPENIA OF MULTIPLE SITES: ICD-10-CM

## 2022-04-28 DIAGNOSIS — C50.911 CARCINOMA OF RIGHT BREAST METASTATIC TO LUNG (HCC): ICD-10-CM

## 2022-04-28 DIAGNOSIS — E66.01 SEVERE OBESITY (BMI 35.0-39.9) WITH COMORBIDITY (HCC): ICD-10-CM

## 2022-04-28 DIAGNOSIS — H35.3231 EXUDATIVE AGE-RELATED MACULAR DEGENERATION OF BOTH EYES WITH ACTIVE CHOROIDAL NEOVASCULARIZATION (HCC): ICD-10-CM

## 2022-04-28 DIAGNOSIS — C78.00 CARCINOMA OF RIGHT BREAST METASTATIC TO LUNG (HCC): ICD-10-CM

## 2022-04-28 DIAGNOSIS — I10 HTN (HYPERTENSION), BENIGN: Primary | ICD-10-CM

## 2022-04-28 PROCEDURE — G8399 PT W/DXA RESULTS DOCUMENT: HCPCS | Performed by: INTERNAL MEDICINE

## 2022-04-28 PROCEDURE — G8752 SYS BP LESS 140: HCPCS | Performed by: INTERNAL MEDICINE

## 2022-04-28 PROCEDURE — G8417 CALC BMI ABV UP PARAM F/U: HCPCS | Performed by: INTERNAL MEDICINE

## 2022-04-28 PROCEDURE — G8432 DEP SCR NOT DOC, RNG: HCPCS | Performed by: INTERNAL MEDICINE

## 2022-04-28 PROCEDURE — G8754 DIAS BP LESS 90: HCPCS | Performed by: INTERNAL MEDICINE

## 2022-04-28 PROCEDURE — 99214 OFFICE O/P EST MOD 30 MIN: CPT | Performed by: INTERNAL MEDICINE

## 2022-04-28 PROCEDURE — G8427 DOCREV CUR MEDS BY ELIG CLIN: HCPCS | Performed by: INTERNAL MEDICINE

## 2022-04-28 PROCEDURE — 1101F PT FALLS ASSESS-DOCD LE1/YR: CPT | Performed by: INTERNAL MEDICINE

## 2022-04-28 PROCEDURE — 1090F PRES/ABSN URINE INCON ASSESS: CPT | Performed by: INTERNAL MEDICINE

## 2022-04-28 PROCEDURE — G8536 NO DOC ELDER MAL SCRN: HCPCS | Performed by: INTERNAL MEDICINE

## 2022-04-28 NOTE — PROGRESS NOTES
Mary Montgomery is a [de-identified] y.o. female and presents with Follow Up Chronic Condition (6 mo fu)  . Subjective:  Mrs. Junior Boyer presents today for 6-month follow-up for hypertension, history of osteopenia, dyspepsia, and history of breast cancer followed by oncology. She continues to get a CT scan and echocardiogram on a regular basis monitoring for complications related to her therapy or her breast cancer. She had labs done recently which she presents with today which showed a glucose of 130. Her previous glucose was 107. Her liver and kidney function are stable. Her complete blood count is stable. She has no shortness of breath, chest pain, palpitations, PND, orthopnea, or pedal edema. She still has some mild postherpetic neuralgia in the right first branch of the trigeminal nerve distribution having had shingles just over a year ago. She declines a shingles vaccine at this time. She had a Pneumovax vaccine previously and we discussed getting a updated PCV 20 vaccine today but she would like to consider doing this in the fall. She takes omeprazole on an as-needed basis. Her blood pressure is well controlled on amlodipine 5 mg daily and atenolol 25 mg daily. Lastly she has some macular degeneration and is followed by ophthalmology closely for this. She is taking AREDS.     Past Medical History:   Diagnosis Date    Breast cancer, right ([address] Dx 4/12/16    Dr Alicia Brown    Exudative age-related macular degeneration of both eyes with active choroidal neovascularization ([address] 1/29/2021    HTN (hypertension), benign 9/22/2017    Hypertension     Motion sickness     Nausea & vomiting     Osteopenia of multiple sites 1/29/2021    Pure hypercholesterolemia 1/29/2021     Past Surgical History:   Procedure Laterality Date    HX CATARACT REMOVAL Bilateral 1997 2005    HX ORTHOPAEDIC Left 2008    part knee replacement    UPPER GI ENDOSCOPY,BIOPSY  6/20/2016         UPPER GI ENDOSCOPY,CTRL BLEED 6/20/2016          Allergies   Allergen Reactions    [place] Cough Drops [Menthol] Swelling     Mouth swelling, had to go to ER    Bee Sting [Sting, Bee] Swelling    Sulfa (Sulfonamide Antibiotics) Hives     Current Outpatient Medications   Medication Sig Dispense Refill    omeprazole (PRILOSEC) 40 mg capsule Take 1 capsule by mouth twice daily 60 Capsule 5    amLODIPine (NORVASC) 5 mg tablet TAKE 1 TABLET EVERY DAY 90 Tablet 3    atenoloL (TENORMIN) 25 mg tablet TAKE 1 TABLET TWICE DAILY 180 Tablet 3    OTHER Indications: circulation and vein support      cholecalciferol (VITAMIN D3) 1,000 unit cap Take  by mouth daily.  trastuzumab (HERCEPTIN IV) by IntraVENous route.  multivitamin (ONE A DAY) tablet Take 1 Tab by mouth daily.  aspirin delayed-release 81 mg tablet Take  by mouth daily. Social History     Socioeconomic History    Marital status:    Tobacco Use    Smoking status: Never Smoker    Smokeless tobacco: Never Used   Vaping Use    Vaping Use: Never used   Substance and Sexual Activity    Alcohol use: Yes     Comment:  occasional    Drug use: Not Currently     Family History   Problem Relation Age of Onset    Heart Disease Mother     Hypertension Mother        Review of Systems  Constitutional:  negative for fevers, chills, anorexia and weight loss  Eyes:    negative for visual disturbance and irritation  ENT:    negative for tinnitus,sore throat,nasal congestion,ear pains. hoarseness  Respiratory:     negative for cough, hemoptysis, dyspnea,wheezing  CV:    negative for chest pain, palpitations, lower extremity edema  GI:    negative for nausea, vomiting, diarrhea, abdominal pain,melena  Endo:               negative for polyuria,polydipsia,polyphagia,heat intolerance  Genitourinary : negative for frequency, dysuria and hematuria  Integumentary: negative for rash and pruritus  Hematologic:   negative for easy bruising and gum/nose bleeding  Musculoskel:  negative for myalgias, arthralgias, back pain, muscle weakness, joint pain  Neurological:   negative for headaches, dizziness, vertigo, memory problems and gait   Behavl/Psych:  negative for feelings of anxiety, depression, mood changes  ROS otherwise negative      Objective:  Visit Vitals  /72 (BP 1 Location: Left upper arm, BP Patient Position: Sitting, BP Cuff Size: Adult)   Pulse 71   Temp 97.9 °F (36.6 °C) (Oral)   Resp 18   Ht 5' 3\" (1.6 m)   Wt 199 lb 6.4 oz (90.4 kg)   SpO2 99%   BMI 35.32 kg/m²     Physical Exam:   General appearance - alert, well appearing, and in no distress  Mental status - alert, oriented to person, place, and time  EYE-ENE, EOMI, fundi normal, corneas normal, no foreign bodies  ENT-ENT exam normal, no neck nodes or sinus tenderness  Nose - normal and patent, no erythema, discharge or polyps  Mouth - mucous membranes moist, pharynx normal without lesions  Neck - supple, no significant adenopathy   Chest - clear to auscultation, no wheezes, rales or rhonchi, symmetric air entry   Heart - normal rate, regular rhythm, normal S1, S2, no murmurs, rubs, clicks or gallops   Abdomen - soft, nontender, nondistended, no masses or organomegaly  Lymph- no adenopathy palpable  Ext-peripheral pulses normal, no pedal edema, no clubbing or cyanosis  Skin-Warm and dry. no hyperpigmentation, vitiligo, or suspicious lesions  Neuro -alert, oriented, normal speech, no focal findings or movement disorder noted      Assessment/Plan:  Diagnoses and all orders for this visit:    1. HTN (hypertension), benign    2. Severe obesity (BMI 35.0-39. 9) with comorbidity ([address]    3. Carcinoma of right breast metastatic to lung ([address]    4. Exudative age-related macular degeneration of both eyes with active choroidal neovascularization ([address]    5. Osteopenia of multiple sites    6. IGT (impaired glucose tolerance)          ICD-10-CM ICD-9-CM    1. HTN (hypertension), benign  I10 401.1    2. Severe obesity (BMI 35.0-39. 9) with comorbidity ([address]  E66.01 278.01    3. Carcinoma of right breast metastatic to lung (HCC)  C50.911 174.9     C78.00 197.0    4. Exudative age-related macular degeneration of both eyes with active choroidal neovascularization ([address]  H35.3231 362.52      362.16    5. Osteopenia of multiple sites  M85.89 733.90    6. IGT (impaired glucose tolerance)  R73.02 790.22      Plan:    Continue current medical regimen as outlined above. Plan follow-up in 6 months for Medicare wellness visit. The patient's labs are consistent with impaired glucose tolerance with a glucose of 130. We will try to avoid concentrated sweets and control carbohydrate portions. We will reevaluate on follow-up in 6 months and consider a PCV 20 vaccine at that time. Follow-up and Dispositions    · Return in about 6 months (around 10/28/2022) for follow up. I have reviewed with the patient details of the assessment and plan and all questions were answered. Relevent patient education was performed. Verbal and/or written instructions (see AVS) provided. The most recent lab findings were reviewed with the patient. Plan was discussed with patient who verbally expressed understanding. An After Visit Summary was printed and given to the patient.     James Golden MD

## 2022-04-28 NOTE — PROGRESS NOTES
Karissa William is a [de-identified] y.o. female presenting for Follow Up Chronic Condition (6 mo fu)  . 1. Have you been to the ER, urgent care clinic since your last visit? Hospitalized since your last visit? No    2. Have you seen or consulted any other health care providers outside of the [address] [place] [address] since your last visit? Include any pap smears or colon screening. No    Fall Risk Assessment, last 12 mths 1/29/2021   Able to walk? Yes   Fall in past 12 months? 0   Do you feel unsteady? 0   Are you worried about falling 0   Number of falls in past 12 months -   Fall with injury? -         Abuse Screening Questionnaire 10/19/2020   Do you ever feel afraid of your partner? N   Are you in a relationship with someone who physically or mentally threatens you? N   Is it safe for you to go home? Y       3 most recent PHQ Screens 10/21/2021   Little interest or pleasure in doing things Not at all   Feeling down, depressed, irritable, or hopeless Not at all   Total Score PHQ 2 0       There are no discontinued medications.

## 2022-05-03 ENCOUNTER — HOSPITAL ENCOUNTER (OUTPATIENT)
Dept: NON INVASIVE DIAGNOSTICS | Age: 82
Discharge: HOME | End: 2022-05-03
Attending: INTERNAL MEDICINE
Payer: MEDICARE

## 2022-05-03 VITALS
BODY MASS INDEX: 35.31 KG/M2 | WEIGHT: 199.3 LBS | DIASTOLIC BLOOD PRESSURE: 72 MMHG | SYSTOLIC BLOOD PRESSURE: 132 MMHG | HEIGHT: 63 IN

## 2022-05-03 DIAGNOSIS — Z01.810 ENCOUNTER FOR PREPROCEDURAL CARDIOVASCULAR EXAMINATION: ICD-10-CM

## 2022-05-03 DIAGNOSIS — Z08 ENCNTR FOR FOLLOW-UP EXAM AFTER TRTMT FOR MALIGNANT NEOPLASM: ICD-10-CM

## 2022-05-03 LAB
ECHO AV AREA PEAK VELOCITY: 1.4 CM2
ECHO AV AREA VTI: 1.7 CM2
ECHO AV AREA/BSA PEAK VELOCITY: 0.7 CM2/M2
ECHO AV AREA/BSA VTI: 0.9 CM2/M2
ECHO AV MEAN GRADIENT: 5 MMHG
ECHO AV MEAN VELOCITY: 1.1 M/S
ECHO AV PEAK GRADIENT: 9 MMHG
ECHO AV PEAK VELOCITY: 1.5 M/S
ECHO AV VELOCITY RATIO: 0.67
ECHO AV VTI: 35.1 CM
ECHO EST RA PRESSURE: 10 MMHG
ECHO LA DIAMETER INDEX: 2.12 CM/M2
ECHO LA DIAMETER: 4.1 CM
ECHO LA VOL 4C: 71 ML (ref 22–52)
ECHO LA VOLUME INDEX A4C: 37 ML/M2 (ref 16–34)
ECHO LV E' LATERAL VELOCITY: 6 CM/S
ECHO LV E' SEPTAL VELOCITY: 7 CM/S
ECHO LV EDV A4C: 71 ML
ECHO LV EDV INDEX A4C: 37 ML/M2
ECHO LV EJECTION FRACTION A4C: 62 %
ECHO LV ESV A4C: 27 ML
ECHO LV ESV INDEX A4C: 14 ML/M2
ECHO LV FRACTIONAL SHORTENING: 32 % (ref 28–44)
ECHO LV INTERNAL DIMENSION DIASTOLE INDEX: 2.59 CM/M2
ECHO LV INTERNAL DIMENSION DIASTOLIC: 5 CM (ref 3.9–5.3)
ECHO LV INTERNAL DIMENSION SYSTOLIC INDEX: 1.76 CM/M2
ECHO LV INTERNAL DIMENSION SYSTOLIC: 3.4 CM
ECHO LV IVSD: 0.8 CM (ref 0.6–0.9)
ECHO LV MASS 2D: 158.2 G (ref 67–162)
ECHO LV MASS INDEX 2D: 82 G/M2 (ref 43–95)
ECHO LV POSTERIOR WALL DIASTOLIC: 1 CM (ref 0.6–0.9)
ECHO LV RELATIVE WALL THICKNESS RATIO: 0.4
ECHO LVOT AREA: 2 CM2
ECHO LVOT AV VTI INDEX: 0.79
ECHO LVOT DIAM: 1.6 CM
ECHO LVOT MEAN GRADIENT: 2 MMHG
ECHO LVOT PEAK GRADIENT: 4 MMHG
ECHO LVOT PEAK VELOCITY: 1 M/S
ECHO LVOT STROKE VOLUME INDEX: 29.1 ML/M2
ECHO LVOT SV: 56.1 ML
ECHO LVOT VTI: 27.9 CM
ECHO MV A VELOCITY: 1.08 M/S
ECHO MV AREA VTI: 1.6 CM2
ECHO MV E DECELERATION TIME (DT): 164.5 MS
ECHO MV E VELOCITY: 1.39 M/S
ECHO MV E/A RATIO: 1.29
ECHO MV E/E' LATERAL: 23.17
ECHO MV E/E' RATIO (AVERAGED): 21.51
ECHO MV E/E' SEPTAL: 19.86
ECHO MV LVOT VTI INDEX: 1.28
ECHO MV MAX VELOCITY: 1.5 M/S
ECHO MV MEAN GRADIENT: 4 MMHG
ECHO MV MEAN VELOCITY: 1 M/S
ECHO MV PEAK GRADIENT: 10 MMHG
ECHO MV REGURGITANT ALIASING (NYQUIST) VELOCITY: 28 CM/S
ECHO MV REGURGITANT PEAK GRADIENT: 149 MMHG
ECHO MV REGURGITANT PEAK VELOCITY: 6.1 M/S
ECHO MV REGURGITANT VELOCITY PISA: 6.5 M/S
ECHO MV REGURGITANT VTIA: 240.5 CM
ECHO MV VTI: 35.8 CM
ECHO PULMONARY ARTERY END DIASTOLIC PRESSURE: 13 MMHG
ECHO PV MAX VELOCITY: 1.1 M/S
ECHO PV PEAK GRADIENT: 4 MMHG
ECHO PV REGURGITANT MAX VELOCITY: 1.6 M/S
ECHO RIGHT VENTRICULAR SYSTOLIC PRESSURE (RVSP): 72 MMHG
ECHO RV FREE WALL PEAK S': 11 CM/S
ECHO RV TAPSE: 1.8 CM (ref 1.7–?)
ECHO TV REGURGITANT MAX VELOCITY: 3.95 M/S
ECHO TV REGURGITANT PEAK GRADIENT: 63 MMHG

## 2022-05-03 PROCEDURE — 93306 TTE W/DOPPLER COMPLETE: CPT

## 2022-05-03 PROCEDURE — 93306 TTE W/DOPPLER COMPLETE: CPT | Performed by: INTERNAL MEDICINE

## 2022-07-21 ENCOUNTER — TRANSCRIBE ORDER (OUTPATIENT)
Dept: SCHEDULING | Age: 82
End: 2022-07-21

## 2022-07-21 DIAGNOSIS — C50.111 MALIGNANT NEOPLASM OF CENTRAL PORTION OF RIGHT FEMALE BREAST (HCC): Primary | ICD-10-CM

## 2022-07-21 DIAGNOSIS — Z79.899 ENCOUNTER FOR LONG-TERM (CURRENT) USE OF OTHER MEDICATIONS: ICD-10-CM

## 2022-07-21 DIAGNOSIS — R19.7 DIARRHEA, UNSPECIFIED: ICD-10-CM

## 2022-07-21 DIAGNOSIS — Z01.810 ENCOUNTER FOR PREPROCEDURAL CARDIOVASCULAR EXAMINATION: ICD-10-CM

## 2022-07-21 DIAGNOSIS — T45.1X5A ANEMIA DUE TO CHEMOTHERAPY: ICD-10-CM

## 2022-07-21 DIAGNOSIS — R11.2 NAUSEA WITH VOMITING: ICD-10-CM

## 2022-07-21 DIAGNOSIS — Z90.710 VAGINAL PAP SMEAR FOLLOWING HYSTERECTOMY FOR MALIGNANCY: ICD-10-CM

## 2022-07-21 DIAGNOSIS — D64.81 ANEMIA DUE TO CHEMOTHERAPY: ICD-10-CM

## 2022-07-21 DIAGNOSIS — Z08 VAGINAL PAP SMEAR FOLLOWING HYSTERECTOMY FOR MALIGNANCY: ICD-10-CM

## 2022-08-16 ENCOUNTER — HOSPITAL ENCOUNTER (OUTPATIENT)
Dept: NON INVASIVE DIAGNOSTICS | Age: 82
Discharge: HOME | End: 2022-08-16
Attending: INTERNAL MEDICINE
Payer: MEDICARE

## 2022-08-16 VITALS
BODY MASS INDEX: 35.31 KG/M2 | WEIGHT: 199.3 LBS | HEIGHT: 63 IN | SYSTOLIC BLOOD PRESSURE: 132 MMHG | DIASTOLIC BLOOD PRESSURE: 72 MMHG

## 2022-08-16 DIAGNOSIS — Z01.810 ENCOUNTER FOR PREPROCEDURAL CARDIOVASCULAR EXAMINATION: ICD-10-CM

## 2022-08-16 DIAGNOSIS — R19.7 DIARRHEA, UNSPECIFIED: ICD-10-CM

## 2022-08-16 DIAGNOSIS — D64.81 ANEMIA DUE TO CHEMOTHERAPY: ICD-10-CM

## 2022-08-16 DIAGNOSIS — Z08 VAGINAL PAP SMEAR FOLLOWING HYSTERECTOMY FOR MALIGNANCY: ICD-10-CM

## 2022-08-16 DIAGNOSIS — Z90.710 VAGINAL PAP SMEAR FOLLOWING HYSTERECTOMY FOR MALIGNANCY: ICD-10-CM

## 2022-08-16 DIAGNOSIS — T45.1X5A ANEMIA DUE TO CHEMOTHERAPY: ICD-10-CM

## 2022-08-16 DIAGNOSIS — C50.111 MALIGNANT NEOPLASM OF CENTRAL PORTION OF RIGHT FEMALE BREAST (HCC): ICD-10-CM

## 2022-08-16 DIAGNOSIS — R11.2 NAUSEA WITH VOMITING: ICD-10-CM

## 2022-08-16 DIAGNOSIS — Z79.899 ENCOUNTER FOR LONG-TERM (CURRENT) USE OF OTHER MEDICATIONS: ICD-10-CM

## 2022-08-16 LAB
ECHO AV AREA PEAK VELOCITY: 2 CM2
ECHO AV AREA VTI: 2.2 CM2
ECHO AV AREA/BSA PEAK VELOCITY: 1 CM2/M2
ECHO AV AREA/BSA VTI: 1.1 CM2/M2
ECHO AV MEAN GRADIENT: 4 MMHG
ECHO AV MEAN VELOCITY: 1 M/S
ECHO AV PEAK GRADIENT: 8 MMHG
ECHO AV PEAK VELOCITY: 1.4 M/S
ECHO AV VELOCITY RATIO: 0.79
ECHO AV VTI: 31.2 CM
ECHO EST RA PRESSURE: 10 MMHG
ECHO LA DIAMETER INDEX: 1.92 CM/M2
ECHO LA DIAMETER: 3.7 CM
ECHO LA VOL 4C: 52 ML (ref 22–52)
ECHO LA VOLUME INDEX A4C: 27 ML/M2 (ref 16–34)
ECHO LV E' LATERAL VELOCITY: 10 CM/S
ECHO LV E' SEPTAL VELOCITY: 10 CM/S
ECHO LV EDV A4C: 67 ML
ECHO LV EDV INDEX A4C: 35 ML/M2
ECHO LV EJECTION FRACTION A4C: 60 %
ECHO LV ESV A4C: 27 ML
ECHO LV ESV INDEX A4C: 14 ML/M2
ECHO LV FRACTIONAL SHORTENING: 27 % (ref 28–44)
ECHO LV INTERNAL DIMENSION DIASTOLE INDEX: 2.64 CM/M2
ECHO LV INTERNAL DIMENSION DIASTOLIC: 5.1 CM (ref 3.9–5.3)
ECHO LV INTERNAL DIMENSION SYSTOLIC INDEX: 1.92 CM/M2
ECHO LV INTERNAL DIMENSION SYSTOLIC: 3.7 CM
ECHO LV IVSD: 0.9 CM (ref 0.6–0.9)
ECHO LV MASS 2D: 163.6 G (ref 67–162)
ECHO LV MASS INDEX 2D: 84.7 G/M2 (ref 43–95)
ECHO LV POSTERIOR WALL DIASTOLIC: 0.9 CM (ref 0.6–0.9)
ECHO LV RELATIVE WALL THICKNESS RATIO: 0.35
ECHO LVOT AREA: 2.8 CM2
ECHO LVOT AV VTI INDEX: 0.83
ECHO LVOT DIAM: 1.9 CM
ECHO LVOT MEAN GRADIENT: 2 MMHG
ECHO LVOT PEAK GRADIENT: 5 MMHG
ECHO LVOT PEAK VELOCITY: 1.1 M/S
ECHO LVOT STROKE VOLUME INDEX: 38 ML/M2
ECHO LVOT SV: 73.4 ML
ECHO LVOT VTI: 25.9 CM
ECHO MV A VELOCITY: 1.01 M/S
ECHO MV AREA PHT: 4.4 CM2
ECHO MV AREA VTI: 1.9 CM2
ECHO MV E DECELERATION TIME (DT): 199 MS
ECHO MV E VELOCITY: 1.36 M/S
ECHO MV E/A RATIO: 1.35
ECHO MV E/E' LATERAL: 13.6
ECHO MV E/E' RATIO (AVERAGED): 13.6
ECHO MV E/E' SEPTAL: 13.6
ECHO MV LVOT VTI INDEX: 1.49
ECHO MV MAX VELOCITY: 1.4 M/S
ECHO MV MEAN GRADIENT: 4 MMHG
ECHO MV MEAN VELOCITY: 1 M/S
ECHO MV PEAK GRADIENT: 8 MMHG
ECHO MV PRESSURE HALF TIME (PHT): 49.9 MS
ECHO MV REGURGITANT PEAK GRADIENT: 135 MMHG
ECHO MV REGURGITANT PEAK VELOCITY: 5.8 M/S
ECHO MV VTI: 38.6 CM
ECHO PULMONARY ARTERY END DIASTOLIC PRESSURE: 6 MMHG
ECHO PV MAX VELOCITY: 1 M/S
ECHO PV PEAK GRADIENT: 4 MMHG
ECHO PV REGURGITANT MAX VELOCITY: 1.1 M/S
ECHO RIGHT VENTRICULAR SYSTOLIC PRESSURE (RVSP): 52 MMHG
ECHO RV FREE WALL PEAK S': 13 CM/S
ECHO RV TAPSE: 1.7 CM (ref 1.7–?)
ECHO TV REGURGITANT MAX VELOCITY: 3.25 M/S
ECHO TV REGURGITANT PEAK GRADIENT: 42 MMHG

## 2022-08-16 PROCEDURE — 93306 TTE W/DOPPLER COMPLETE: CPT

## 2022-09-19 DIAGNOSIS — C78.00 CARCINOMA OF RIGHT BREAST METASTATIC TO LUNG (HCC): ICD-10-CM

## 2022-09-19 DIAGNOSIS — C50.911 CARCINOMA OF RIGHT BREAST METASTATIC TO LUNG (HCC): ICD-10-CM

## 2022-09-19 DIAGNOSIS — Z13.31 SCREENING FOR DEPRESSION: ICD-10-CM

## 2022-09-19 DIAGNOSIS — Z00.00 MEDICARE ANNUAL WELLNESS VISIT, SUBSEQUENT: ICD-10-CM

## 2022-09-19 DIAGNOSIS — Z23 ENCOUNTER FOR IMMUNIZATION: ICD-10-CM

## 2022-09-19 DIAGNOSIS — Z13.6 SCREENING FOR ISCHEMIC HEART DISEASE: ICD-10-CM

## 2022-09-19 DIAGNOSIS — I10 ESSENTIAL HYPERTENSION: ICD-10-CM

## 2022-09-19 RX ORDER — ATENOLOL 25 MG/1
TABLET ORAL
Qty: 180 TABLET | Refills: 3 | Status: SHIPPED | OUTPATIENT
Start: 2022-09-19

## 2022-09-22 ENCOUNTER — TRANSCRIBE ORDER (OUTPATIENT)
Dept: SCHEDULING | Age: 82
End: 2022-09-22

## 2022-09-22 DIAGNOSIS — Z01.810 ENCOUNTER FOR PREPROCEDURAL CARDIOVASCULAR EXAMINATION: ICD-10-CM

## 2022-09-22 DIAGNOSIS — R19.7 DIARRHEA, UNSPECIFIED: ICD-10-CM

## 2022-09-22 DIAGNOSIS — R11.2 NAUSEA WITH VOMITING, UNSPECIFIED: ICD-10-CM

## 2022-09-22 DIAGNOSIS — Z79.899 OTHER LONG TERM (CURRENT) DRUG THERAPY: ICD-10-CM

## 2022-09-22 DIAGNOSIS — T45.1X5A ANEMIA DUE TO ANTINEOPLASTIC CHEMOTHERAPY: ICD-10-CM

## 2022-09-22 DIAGNOSIS — Z08 ENCNTR FOR FOLLOW-UP EXAM AFTER TRTMT FOR MALIGNANT NEOPLASM: ICD-10-CM

## 2022-09-22 DIAGNOSIS — C50.111 MALIGNANT NEOPLASM OF CENTRAL PORTION OF RIGHT FEMALE BREAST (HCC): Primary | ICD-10-CM

## 2022-09-22 DIAGNOSIS — D64.81 ANEMIA DUE TO ANTINEOPLASTIC CHEMOTHERAPY: ICD-10-CM

## 2022-10-25 ENCOUNTER — CLINICAL SUPPORT (OUTPATIENT)
Dept: INTERNAL MEDICINE CLINIC | Age: 82
End: 2022-10-25
Payer: MEDICARE

## 2022-10-25 VITALS
BODY MASS INDEX: 33.66 KG/M2 | DIASTOLIC BLOOD PRESSURE: 78 MMHG | HEIGHT: 63 IN | SYSTOLIC BLOOD PRESSURE: 130 MMHG | OXYGEN SATURATION: 98 % | TEMPERATURE: 98.8 F | RESPIRATION RATE: 16 BRPM | HEART RATE: 79 BPM | WEIGHT: 190 LBS

## 2022-10-25 DIAGNOSIS — Z23 NEEDS FLU SHOT: Primary | ICD-10-CM

## 2022-10-25 PROCEDURE — G0008 ADMIN INFLUENZA VIRUS VAC: HCPCS | Performed by: INTERNAL MEDICINE

## 2022-10-25 PROCEDURE — 90694 VACC AIIV4 NO PRSRV 0.5ML IM: CPT | Performed by: INTERNAL MEDICINE

## 2022-10-25 NOTE — PROGRESS NOTES
After obtaining written consent and per orders of Dr. Rhina Temple, injection of Fluad given by Nathan Healy [address] [place] [address] Alejandra. Patient tolerated procedure well. VIS was given to them. No reactions noted.

## 2022-10-25 NOTE — PATIENT INSTRUCTIONS
Vaccine Information Statement    Influenza (Flu) Vaccine (Inactivated or Recombinant): What You Need to Know    Many vaccine information statements are available in Korean and other languages. See www.immunize.org/vis. Hojas de información sobre vacunas están disponibles en español y en muchos otros idiomas. Visite www.immunize.org/vis. 1. Why get vaccinated? Influenza vaccine can prevent influenza (flu). Flu is a contagious disease that spreads around the [facility] every year, usually between October and May. Anyone can get the flu, but it is more dangerous for some people. Infants and young children, people 72 years and older, pregnant people, and people with certain health conditions or a weakened immune system are at greatest risk of flu complications. Pneumonia, bronchitis, sinus infections, and ear infections are examples of flu-related complications. If you have a medical condition, such as heart disease, cancer, or diabetes, flu can make it worse. Flu can cause fever and chills, sore throat, muscle aches, fatigue, cough, headache, and runny or stuffy nose. Some people may have vomiting and diarrhea, though this is more common in children than adults. In an average year, thousands of people in the [facility] die from flu, and many more are hospitalized. Flu vaccine prevents millions of illnesses and flu-related visits to the doctor each year. 2. Influenza vaccines     CDC recommends everyone 6 months and older get vaccinated every flu season. Children 6 months through 6years of age may need 2 doses during a single flu season. Everyone else needs only 1 dose each flu season. It takes about 2 weeks for protection to develop after vaccination. There are many flu viruses, and they are always changing. Each year a new flu vaccine is made to protect against the influenza viruses believed to be likely to cause disease in the upcoming flu season.  Even when the vaccine doesnt exactly match these viruses, it may still provide some protection. Influenza vaccine does not cause flu. Influenza vaccine may be given at the same time as other vaccines. 3. Talk with your health care provider    Tell your vaccination provider if the person getting the vaccine:  Has had an allergic reaction after a previous dose of influenza vaccine, or has any severe, life-threatening allergies   Has ever had Guillain-Barré Syndrome (also called GBS)    In some cases, your health care provider may decide to postpone influenza vaccination until a future visit. Influenza vaccine can be administered at any time during pregnancy. People who are or will be pregnant during influenza season should receive inactivated influenza vaccine. People with minor illnesses, such as a cold, may be vaccinated. People who are moderately or severely ill should usually wait until they recover before getting influenza vaccine. Your health care provider can give you more information. 4. Risks of a vaccine reaction    Soreness, redness, and swelling where the shot is given, fever, muscle aches, and headache can happen after influenza vaccination. There may be a very small increased risk of Guillain-Barré Syndrome (GBS) after inactivated influenza vaccine (the flu shot). [place] children who get the flu shot along with pneumococcal vaccine (PCV13) and/or DTaP vaccine at the same time might be slightly more likely to have a seizure caused by fever. Tell your health care provider if a child who is getting flu vaccine has ever had a seizure. People sometimes faint after medical procedures, including vaccination. Tell your provider if you feel dizzy or have vision changes or ringing in the ears. As with any medicine, there is a very remote chance of a vaccine causing a severe allergic reaction, other serious injury, or death. 5. What if there is a serious problem?     An allergic reaction could occur after the vaccinated person leaves the clinic. If you see signs of a severe allergic reaction (hives, swelling of the face and throat, difficulty breathing, a fast heartbeat, dizziness, or weakness), call 9-1-1 and get the person to the nearest hospital.    For other signs that concern you, call your health care provider. Adverse reactions should be reported to the Vaccine Adverse Event Reporting System (VAERS). Your health care provider will usually file this report, or you can do it yourself. Visit the VAERS website at www.vaers. [facility].gov or call 0-263.485.7794. VAERS is only for reporting reactions, and VAERS staff members do not give medical advice. 6. The National Vaccine Injury Compensation Program    The [facility] Vaccine Injury Compensation Program (VICP) is a federal program that was created to compensate people who may have been injured by certain vaccines. Claims regarding alleged injury or death due to vaccination have a time limit for filing, which may be as short as two years. Visit the VICP website at www.[facility].gov/vaccinecompensation or call 3-250.946.8726 to learn about the program and about filing a claim. 7. How can I learn more? Ask your health care provider. Call your local or state health department. Visit the website of the Food and Drug Administration (FDA) for vaccine package inserts and additional information at www.fda.gov/vaccines-blood-biologics/vaccines. Contact the Centers for Disease Control and Prevention (CDC): Call 3-519.147.8836 (1-800-CDC-INFO) or  Visit CDCs influenza website at www.cdc.gov/flu. Vaccine Information Statement   Inactivated Influenza Vaccine   8/6/2021  42 CHASIDY Faith [id number]   Department of Health and Human Services  Centers for Disease Control and Prevention    Office Use Only

## 2022-11-14 ENCOUNTER — OFFICE VISIT (OUTPATIENT)
Dept: INTERNAL MEDICINE CLINIC | Age: 82
End: 2022-11-14
Payer: MEDICARE

## 2022-11-14 VITALS
SYSTOLIC BLOOD PRESSURE: 120 MMHG | HEART RATE: 68 BPM | TEMPERATURE: 98.3 F | RESPIRATION RATE: 16 BRPM | WEIGHT: 189.4 LBS | BODY MASS INDEX: 33.56 KG/M2 | OXYGEN SATURATION: 97 % | DIASTOLIC BLOOD PRESSURE: 74 MMHG | HEIGHT: 63 IN

## 2022-11-14 DIAGNOSIS — K64.4 EXTERNAL HEMORRHOIDS: ICD-10-CM

## 2022-11-14 DIAGNOSIS — Z13.1 SCREENING FOR DIABETES MELLITUS: ICD-10-CM

## 2022-11-14 DIAGNOSIS — Z13.31 SCREENING FOR DEPRESSION: ICD-10-CM

## 2022-11-14 DIAGNOSIS — I10 HTN (HYPERTENSION), BENIGN: ICD-10-CM

## 2022-11-14 DIAGNOSIS — R73.02 IGT (IMPAIRED GLUCOSE TOLERANCE): ICD-10-CM

## 2022-11-14 DIAGNOSIS — Z00.00 MEDICARE ANNUAL WELLNESS VISIT, SUBSEQUENT: Primary | ICD-10-CM

## 2022-11-14 DIAGNOSIS — C78.00 CARCINOMA OF RIGHT BREAST METASTATIC TO LUNG (HCC): ICD-10-CM

## 2022-11-14 DIAGNOSIS — Z13.6 SCREENING FOR ISCHEMIC HEART DISEASE: ICD-10-CM

## 2022-11-14 DIAGNOSIS — Z71.89 ADVANCED DIRECTIVES, COUNSELING/DISCUSSION: ICD-10-CM

## 2022-11-14 DIAGNOSIS — C50.911 CARCINOMA OF RIGHT BREAST METASTATIC TO LUNG (HCC): ICD-10-CM

## 2022-11-14 DIAGNOSIS — E78.00 PURE HYPERCHOLESTEROLEMIA: ICD-10-CM

## 2022-11-14 DIAGNOSIS — D50.9 IRON DEFICIENCY ANEMIA, UNSPECIFIED IRON DEFICIENCY ANEMIA TYPE: ICD-10-CM

## 2022-11-14 PROCEDURE — 1101F PT FALLS ASSESS-DOCD LE1/YR: CPT | Performed by: INTERNAL MEDICINE

## 2022-11-14 PROCEDURE — G8427 DOCREV CUR MEDS BY ELIG CLIN: HCPCS | Performed by: INTERNAL MEDICINE

## 2022-11-14 PROCEDURE — G0439 PPPS, SUBSEQ VISIT: HCPCS | Performed by: INTERNAL MEDICINE

## 2022-11-14 PROCEDURE — 3074F SYST BP LT 130 MM HG: CPT | Performed by: INTERNAL MEDICINE

## 2022-11-14 PROCEDURE — G8754 DIAS BP LESS 90: HCPCS | Performed by: INTERNAL MEDICINE

## 2022-11-14 PROCEDURE — G8752 SYS BP LESS 140: HCPCS | Performed by: INTERNAL MEDICINE

## 2022-11-14 PROCEDURE — 1090F PRES/ABSN URINE INCON ASSESS: CPT | Performed by: INTERNAL MEDICINE

## 2022-11-14 PROCEDURE — G8417 CALC BMI ABV UP PARAM F/U: HCPCS | Performed by: INTERNAL MEDICINE

## 2022-11-14 PROCEDURE — G8536 NO DOC ELDER MAL SCRN: HCPCS | Performed by: INTERNAL MEDICINE

## 2022-11-14 PROCEDURE — G8399 PT W/DXA RESULTS DOCUMENT: HCPCS | Performed by: INTERNAL MEDICINE

## 2022-11-14 PROCEDURE — 99214 OFFICE O/P EST MOD 30 MIN: CPT | Performed by: INTERNAL MEDICINE

## 2022-11-14 PROCEDURE — 3078F DIAST BP <80 MM HG: CPT | Performed by: INTERNAL MEDICINE

## 2022-11-14 PROCEDURE — 1123F ACP DISCUSS/DSCN MKR DOCD: CPT | Performed by: INTERNAL MEDICINE

## 2022-11-14 PROCEDURE — G8510 SCR DEP NEG, NO PLAN REQD: HCPCS | Performed by: INTERNAL MEDICINE

## 2022-11-14 NOTE — PROGRESS NOTES
This is the Subsequent Medicare Annual Wellness Exam, performed 12 months or more after the Initial AWV or the last Subsequent AWV    I have reviewed the patient's medical history in detail and updated the computerized patient record. Assessment/Plan   Education and counseling provided:  Are appropriate based on today's review and evaluation    1. Medicare annual wellness visit, subsequent  2. HTN (hypertension), benign  -     LIPID PANEL; Future  -     METABOLIC PANEL, COMPREHENSIVE; Future  -     URINALYSIS W/ RFLX MICROSCOPIC; Future  3. Carcinoma of right breast metastatic to lung ([facility] [address]  -     CBC WITH AUTOMATED DIFF; Future  -     METABOLIC PANEL, COMPREHENSIVE; Future  -     URINALYSIS W/ RFLX MICROSCOPIC; Future  -     IRON PROFILE; Future  4. Pure hypercholesterolemia  -     LIPID PANEL; Future  5. IGT (impaired glucose tolerance)  -     HEMOGLOBIN A1C WITH EAG; Future  6. Iron deficiency anemia, unspecified iron deficiency anemia type  -     IRON PROFILE; Future  7. Advanced directives, counseling/discussion  8. Screening for diabetes mellitus  9. Screening for ischemic heart disease  10. Screening for depression  -     DEPRESSION SCREEN ANNUAL  11. Body mass index 33.0-33.9, adult  12. External hemorrhoids       Depression Risk Factor Screening     3 most recent PHQ Screens 11/14/2022   Little interest or pleasure in doing things Not at all   Feeling down, depressed, irritable, or hopeless Not at all   Total Score PHQ 2 0       Alcohol & Drug Abuse Risk Screen    Do you average more than 1 drink per night or more than 7 drinks a week:  No    On any one occasion in the past three months have you have had more than 3 drinks containing alcohol:  No          Functional Ability and Level of Safety    Hearing: Hearing is good. Activities of Daily Living: The home contains: no safety equipment.   Patient does total self care      Ambulation: with no difficulty     Fall Risk:  Fall Risk Assessment, last 12 mths 11/14/2022   Able to walk? Yes   Fall in past 12 months? 0   Do you feel unsteady? 0   Are you worried about falling 0   Number of falls in past 12 months -   Fall with injury?  -      Abuse Screen:  Patient is not abused       Cognitive Screening    Has your family/caregiver stated any concerns about your memory: no     Cognitive Screening: Normal - Verbal Fluency Test    Health Maintenance Due     Health Maintenance Due   Topic Date Due    Pneumococcal 65+ years (1 - PCV) Never done    Shingrix Vaccine Age 50> (1 of 2) Never done    COVID-19 Vaccine (3 - Pfizer risk series) 04/19/2021    Depression Screen  10/21/2022       Patient Care Team   Patient Care Team:  Fariba Chamberlain MD as PCP - General (Internal Medicine Physician)  Fariba Chamberlain MD as PCP - [address] [place] [address]  [facility] Provider  Milton Mccracken MD as Physician (Hematology and Oncology)    History     Patient Active Problem List   Diagnosis Code    Inversion, nipple right N64.59    Carcinoma of right breast metastatic to lung [facility]) C50.911, C78.00    Acute upper GI bleed K92.2    Acute blood loss anemia D62    Nausea & vomiting R11.2    Pre-syncope R55    Hypovolemia E86.1    Anemia D64.9    Breast cancer, right ([facility] [address] C50.911    Cellulitis L03.90    Duodenal ulcer K26.9    Fatigue R53.83    History of total knee replacement Z96.659    History of uterine fibroid Z86.018    Hyperlipidemia, acquired E78.5    Neuralgia M79.2    Nipple retraction N64.53    Post-menopausal Z78.0    Sialoadenitis K11.20    HTN (hypertension), benign I10    Severe obesity ([address] E66.01    Pure hypercholesterolemia E78.00    Osteopenia of multiple sites M85.89    Exudative age-related macular degeneration of both eyes with active choroidal neovascularization ([address] Y45.4089     Past Medical History:   Diagnosis Date    Breast cancer, right ([address] Dx 4/12/16    Dr Maira Clark    Exudative age-related macular degeneration of both eyes with active choroidal neovascularization ([facility] [address] 1/29/2021    HTN (hypertension), benign 9/22/2017    Hypertension     Motion sickness     Nausea & vomiting     Osteopenia of multiple sites 1/29/2021    Pure hypercholesterolemia 1/29/2021      Past Surgical History:   Procedure Laterality Date    HX CATARACT REMOVAL Bilateral 1997 2005    HX ORTHOPAEDIC Left 2008    part knee replacement    UPPER GI ENDOSCOPY,BIOPSY  6/20/2016         UPPER GI ENDOSCOPY,CTRL BLEED  6/20/2016          Current Outpatient Medications   Medication Sig Dispense Refill    vit A/vit C/vit E/zinc/copper (PRESERVISION AREDS PO) Take  by mouth daily. atenoloL (TENORMIN) 25 mg tablet TAKE 1 TABLET TWICE DAILY 180 Tablet 3    omeprazole (PRILOSEC) 40 mg capsule Take 1 capsule by mouth twice daily 60 Capsule 5    amLODIPine (NORVASC) 5 mg tablet TAKE 1 TABLET EVERY DAY 90 Tablet 3    OTHER Indications: circulation and vein support      cholecalciferol (VITAMIN D3) 25 mcg (1,000 unit) cap Take  by mouth daily. trastuzumab (HERCEPTIN IV) by IntraVENous route. multivitamin (ONE A DAY) tablet Take 1 Tab by mouth daily. aspirin delayed-release 81 mg tablet Take  by mouth daily. Allergies   Allergen Reactions    [place] Cough Drops [Menthol] Swelling     Mouth swelling, had to go to ER    Bee Sting [Sting, Bee] Swelling    Sulfa (Sulfonamide Antibiotics) Hives       Family History   Problem Relation Age of Onset    Heart Disease Mother     Hypertension Mother      Social History     Tobacco Use    Smoking status: Never    Smokeless tobacco: Never   Substance Use Topics    Alcohol use: Yes     Comment:  sita         C Merissa Aguilar MD         Lexus Mcdaniel is a 81 y.o. female and presents with Annual Wellness Visit  . Subjective:  Lexus Mcdaniel presents today for follow-up of hypertension, history of breast cancer, mild hyperlipidemia, reflux. She continues Herceptin for her history of breast cancer and has an echocardiogram done on a regular basis.   She has no shortness of breath, chest pain, palpitations, PND, orthopnea, or pedal edema. Her blood pressure is well controlled on 5 mg of amlodipine and 25 mg of atenolol daily. She remains on omeprazole daily for reflux which appears to be well controlled. She has had some intermittent trace blood per stool with issues with hemorrhoids on and off for years. She has an appointment to see GI in a couple of months. Weight is down about 10 pounds from her previous weight of 199 in August.    Past Medical History:   Diagnosis Date    Breast cancer, right ([address] Dx 4/12/16    Dr Rivera Areas    Exudative age-related macular degeneration of both eyes with active choroidal neovascularization ([facility] [address] 1/29/2021    HTN (hypertension), benign 9/22/2017    Hypertension     Motion sickness     Nausea & vomiting     Osteopenia of multiple sites 1/29/2021    Pure hypercholesterolemia 1/29/2021     Past Surgical History:   Procedure Laterality Date    HX CATARACT REMOVAL Bilateral 1997 2005    HX ORTHOPAEDIC Left 2008    part knee replacement    UPPER GI ENDOSCOPY,BIOPSY  6/20/2016         UPPER GI ENDOSCOPY,CTRL BLEED  6/20/2016          Allergies   Allergen Reactions    [place] Cough Drops [Menthol] Swelling     Mouth swelling, had to go to ER    Bee Sting [Sting, Bee] Swelling    Sulfa (Sulfonamide Antibiotics) Hives     Current Outpatient Medications   Medication Sig Dispense Refill    vit A/vit C/vit E/zinc/copper (PRESERVISION AREDS PO) Take  by mouth daily. atenoloL (TENORMIN) 25 mg tablet TAKE 1 TABLET TWICE DAILY 180 Tablet 3    omeprazole (PRILOSEC) 40 mg capsule Take 1 capsule by mouth twice daily 60 Capsule 5    amLODIPine (NORVASC) 5 mg tablet TAKE 1 TABLET EVERY DAY 90 Tablet 3    OTHER Indications: circulation and vein support      cholecalciferol (VITAMIN D3) 25 mcg (1,000 unit) cap Take  by mouth daily. trastuzumab (HERCEPTIN IV) by IntraVENous route.       multivitamin (ONE A DAY) tablet Take 1 Tab by mouth daily.      aspirin delayed-release 81 mg tablet Take  by mouth daily. Social History     Socioeconomic History    Marital status:    Tobacco Use    Smoking status: Never    Smokeless tobacco: Never   Vaping Use    Vaping Use: Never used   Substance and Sexual Activity    Alcohol use: Yes     Comment:  occasional    Drug use: Not Currently     Family History   Problem Relation Age of Onset    Heart Disease Mother     Hypertension Mother        Review of Systems  Constitutional:  negative for fevers, chills, anorexia and weight loss  Eyes:    negative for visual disturbance and irritation  ENT:    negative for tinnitus,sore throat,nasal congestion,ear pains. hoarseness  Respiratory:     negative for cough, hemoptysis, dyspnea,wheezing  CV:    negative for chest pain, palpitations, lower extremity edema  GI:    negative for nausea, vomiting, diarrhea, abdominal pain,melena  Endo:               negative for polyuria,polydipsia,polyphagia,heat intolerance  Genitourinary : negative for frequency, dysuria and hematuria  Integumentary: negative for rash and pruritus  Hematologic:   negative for easy bruising and gum/nose bleeding  Musculoskel:  negative for myalgias, arthralgias, back pain, muscle weakness, joint pain  Neurological:   negative for headaches, dizziness, vertigo, memory problems and gait   Behavl/Psych:  negative for feelings of anxiety, depression, mood changes  ROS otherwise negative      Objective:  Visit Vitals  /74 (BP 1 Location: Left upper arm, BP Patient Position: Sitting, BP Cuff Size: Adult)   Pulse 68   Temp 98.3 °F (36.8 °C) (Oral)   Resp 16   Ht 5' 3\" (1.6 m)   Wt 189 lb 6.4 oz (85.9 kg)   SpO2 97%   BMI 33.55 kg/m²     Physical Exam:   General appearance - alert, well appearing, and in no distress  Mental status - alert, oriented to person, place, and time  EYE-ENE, EOMI, fundi normal, corneas normal, no foreign bodies  ENT-ENT exam normal, no neck nodes or sinus tenderness  Nose - normal and patent, no erythema, discharge or polyps  Mouth - mucous membranes moist, pharynx normal without lesions  Neck - supple, no significant adenopathy   Chest - clear to auscultation, no wheezes, rales or rhonchi, symmetric air entry   Heart - normal rate, regular rhythm, normal S1, S2, no murmurs, rubs, clicks or gallops   Abdomen - soft, nontender, nondistended, no masses or organomegaly  Lymph- no adenopathy palpable  Ext-peripheral pulses normal, no pedal edema, no clubbing or cyanosis  Skin-Warm and dry. no hyperpigmentation, vitiligo, or suspicious lesions  Neuro -alert, oriented, normal speech, no focal findings or movement disorder noted  Rectal exam-with nurse present in the room a rectal exam was performed with direct observation of external hemorrhoidal tissue with minimal inflammation, the anal verge was intact with normal muscle tone, there is no palpable mass internally, there is no appreciation of a rectocele on palpation, there was trace blood-tinged mucousy discharge    Assessment/Plan:  Diagnoses and all orders for this visit:    1. Medicare annual wellness visit, subsequent    2. HTN (hypertension), benign  -     LIPID PANEL; Future  -     METABOLIC PANEL, COMPREHENSIVE; Future  -     URINALYSIS W/ RFLX MICROSCOPIC; Future    3. Carcinoma of right breast metastatic to lung ([facility] [address]  -     CBC WITH AUTOMATED DIFF; Future  -     METABOLIC PANEL, COMPREHENSIVE; Future  -     URINALYSIS W/ RFLX MICROSCOPIC; Future  -     IRON PROFILE; Future    4. Pure hypercholesterolemia  -     LIPID PANEL; Future    5. IGT (impaired glucose tolerance)  -     HEMOGLOBIN A1C WITH EAG; Future    6. Iron deficiency anemia, unspecified iron deficiency anemia type  -     IRON PROFILE; Future    7. Advanced directives, counseling/discussion    8. Screening for diabetes mellitus    9. Screening for ischemic heart disease    10. Screening for depression  -     DEPRESSION SCREEN ANNUAL    11.  Body mass index 33.0-33.9, adult    12. External hemorrhoids        ICD-10-CM ICD-9-CM    1. Medicare annual wellness visit, subsequent  Z00.00 V70.0       2. HTN (hypertension), benign  I10 401.1 LIPID PANEL      METABOLIC PANEL, COMPREHENSIVE      URINALYSIS W/ RFLX MICROSCOPIC      METABOLIC PANEL, COMPREHENSIVE      LIPID PANEL      URINALYSIS W/ RFLX MICROSCOPIC      3. Carcinoma of right breast metastatic to lung ([facility])  C50.911 174.9 CBC WITH AUTOMATED DIFF    R98.68 158.9 METABOLIC PANEL, COMPREHENSIVE      URINALYSIS W/ RFLX MICROSCOPIC      IRON PROFILE      IRON PROFILE      METABOLIC PANEL, COMPREHENSIVE      CBC WITH AUTOMATED DIFF      URINALYSIS W/ RFLX MICROSCOPIC      4. Pure hypercholesterolemia  E78.00 272.0 LIPID PANEL      LIPID PANEL      5. IGT (impaired glucose tolerance)  R73.02 790.22 HEMOGLOBIN A1C WITH EAG      HEMOGLOBIN A1C WITH EAG      6. Iron deficiency anemia, unspecified iron deficiency anemia type  D50.9 280.9 IRON PROFILE      IRON PROFILE      7. Advanced directives, counseling/discussion  Z71.89 V65.49       8. Screening for diabetes mellitus  Z13.1 V77.1       9. Screening for ischemic heart disease  Z13.6 V81.0       10. Screening for depression  Z13.31 V79.0 [facility] 68      11. Body mass index 33.0-33.9, adult  Z68.33 V85.33       12. External hemorrhoids  K64.4 455.3         Plan:    Further recommendations based on labs as ordered. Although I suspect the patient may have some degree of hemorrhoids and possibly some intermittent bleeding or proctitis she does not have any significant symptoms at this time. She will have a hemoglobin and iron profile performed as well. Will defer to GI to decide whether a colonoscopy is indicated at this time. Follow-up and Dispositions    Return in about 6 months (around 5/14/2023). I have reviewed with the patient details of the assessment and plan and all questions were answered. Relevent patient education was performed. Verbal and/or written instructions (see AVS) provided. The most recent lab findings were reviewed with the patient. Plan was discussed with patient who verbally expressed understanding. An After Visit Summary was printed and given to the patient.     Matthew Swain MD

## 2022-11-14 NOTE — PATIENT INSTRUCTIONS

## 2022-11-14 NOTE — PROGRESS NOTES
Chief Complaint   Patient presents with    Annual Wellness Visit       Depression Risk Factor Screening:     3 most recent PHQ Screens 11/14/2022   Little interest or pleasure in doing things Not at all   Feeling down, depressed, irritable, or hopeless Not at all   Total Score PHQ 2 0       Functional Ability and Level of Safety:     Activities of Daily Living  ADL Assessment 11/14/2022   Feeding yourself No Help Needed   Getting from bed to chair No Help Needed   Getting dressed No Help Needed   Bathing or showering No Help Needed   Walk across the room (includes cane/walker) No Help Needed   Using the telphone No Help Needed   Taking your medications No Help Needed   Preparing meals No Help Needed   Managing money (expenses/bills) No Help Needed   Moderately strenuous housework (laundry) No Help Needed   Shopping for personal items (toiletries/medicines) No Help Needed   Shopping for groceries No Help Needed   Driving No Help Needed   Climbing a flight of stairs No Help Needed   Getting to places beyond walking distances No Help Needed       Fall Risk  Fall Risk Assessment, last 12 mths 11/14/2022   Able to walk? Yes   Fall in past 12 months? 0   Do you feel unsteady? 0   Are you worried about falling 0   Number of falls in past 12 months -   Fall with injury? -       Abuse Screen  Abuse Screening Questionnaire 11/14/2022   Do you ever feel afraid of your partner? N   Are you in a relationship with someone who physically or mentally threatens you? N   Is it safe for you to go home?  Y         Patient Care Team   Patient Care Team:  Verenice Robbins MD as PCP - General (Internal Medicine Physician)  Verenice Robbins MD as PCP - [facility]an[facility] Provider  Brennan Beth MD as Physician (Hematology and Oncology)

## 2022-11-15 LAB
ALBUMIN SERPL-MCNC: 3.3 G/DL (ref 3.5–5)
ALBUMIN/GLOB SERPL: 0.9 {RATIO} (ref 1.1–2.2)
ALP SERPL-CCNC: 110 U/L (ref 45–117)
ALT SERPL-CCNC: 19 U/L (ref 12–78)
ANION GAP SERPL CALC-SCNC: 5 MMOL/L (ref 5–15)
APPEARANCE UR: CLEAR
AST SERPL-CCNC: 13 U/L (ref 15–37)
BASOPHILS # BLD: 0.1 K/UL (ref 0–0.1)
BASOPHILS NFR BLD: 1 % (ref 0–1)
BILIRUB SERPL-MCNC: 0.2 MG/DL (ref 0.2–1)
BILIRUB UR QL: NEGATIVE
BUN SERPL-MCNC: 17 MG/DL (ref 6–20)
BUN/CREAT SERPL: 19 (ref 12–20)
CALCIUM SERPL-MCNC: 9.3 MG/DL (ref 8.5–10.1)
CHLORIDE SERPL-SCNC: 107 MMOL/L (ref 97–108)
CHOLEST SERPL-MCNC: 227 MG/DL
CO2 SERPL-SCNC: 31 MMOL/L (ref 21–32)
COLOR UR: ABNORMAL
CREAT SERPL-MCNC: 0.91 MG/DL (ref 0.55–1.02)
DIFFERENTIAL METHOD BLD: ABNORMAL
EOSINOPHIL # BLD: 0.5 K/UL (ref 0–0.4)
EOSINOPHIL NFR BLD: 5 % (ref 0–7)
ERYTHROCYTE [DISTWIDTH] IN BLOOD BY AUTOMATED COUNT: 13.8 % (ref 11.5–14.5)
EST. AVERAGE GLUCOSE BLD GHB EST-MCNC: 117 MG/DL
GLOBULIN SER CALC-MCNC: 3.6 G/DL (ref 2–4)
GLUCOSE SERPL-MCNC: 105 MG/DL (ref 65–100)
GLUCOSE UR STRIP.AUTO-MCNC: NEGATIVE MG/DL
HBA1C MFR BLD: 5.7 % (ref 4–5.6)
HCT VFR BLD AUTO: 37.8 % (ref 35–47)
HDLC SERPL-MCNC: 50 MG/DL
HDLC SERPL: 4.5 {RATIO} (ref 0–5)
HGB BLD-MCNC: 11.6 G/DL (ref 11.5–16)
HGB UR QL STRIP: NEGATIVE
IMM GRANULOCYTES # BLD AUTO: 0 K/UL (ref 0–0.04)
IMM GRANULOCYTES NFR BLD AUTO: 0 % (ref 0–0.5)
IRON SATN MFR SERPL: 8 % (ref 20–50)
IRON SERPL-MCNC: 33 UG/DL (ref 35–150)
KETONES UR QL STRIP.AUTO: ABNORMAL MG/DL
LDLC SERPL CALC-MCNC: 147 MG/DL (ref 0–100)
LEUKOCYTE ESTERASE UR QL STRIP.AUTO: NEGATIVE
LYMPHOCYTES # BLD: 1.9 K/UL (ref 0.8–3.5)
LYMPHOCYTES NFR BLD: 20 % (ref 12–49)
MCH RBC QN AUTO: 28.2 PG (ref 26–34)
MCHC RBC AUTO-ENTMCNC: 30.7 G/DL (ref 30–36.5)
MCV RBC AUTO: 92 FL (ref 80–99)
MONOCYTES # BLD: 0.7 K/UL (ref 0–1)
MONOCYTES NFR BLD: 8 % (ref 5–13)
NEUTS SEG # BLD: 6.3 K/UL (ref 1.8–8)
NEUTS SEG NFR BLD: 66 % (ref 32–75)
NITRITE UR QL STRIP.AUTO: NEGATIVE
NRBC # BLD: 0 K/UL (ref 0–0.01)
NRBC BLD-RTO: 0 PER 100 WBC
PH UR STRIP: 7 [PH] (ref 5–8)
PLATELET # BLD AUTO: 317 K/UL (ref 150–400)
PMV BLD AUTO: 12.8 FL (ref 8.9–12.9)
POTASSIUM SERPL-SCNC: 4.8 MMOL/L (ref 3.5–5.1)
PROT SERPL-MCNC: 6.9 G/DL (ref 6.4–8.2)
PROT UR STRIP-MCNC: NEGATIVE MG/DL
RBC # BLD AUTO: 4.11 M/UL (ref 3.8–5.2)
SODIUM SERPL-SCNC: 143 MMOL/L (ref 136–145)
SP GR UR REFRACTOMETRY: 1.02 (ref 1–1.03)
TIBC SERPL-MCNC: 392 UG/DL (ref 250–450)
TRIGL SERPL-MCNC: 150 MG/DL (ref ?–150)
UROBILINOGEN UR QL STRIP.AUTO: 0.2 EU/DL (ref 0.2–1)
VLDLC SERPL CALC-MCNC: 30 MG/DL
WBC # BLD AUTO: 9.4 K/UL (ref 3.6–11)

## 2022-11-21 ENCOUNTER — HOSPITAL ENCOUNTER (OUTPATIENT)
Dept: NON INVASIVE DIAGNOSTICS | Age: 82
Discharge: HOME | End: 2022-11-21
Attending: INTERNAL MEDICINE
Payer: MEDICARE

## 2022-11-21 VITALS
SYSTOLIC BLOOD PRESSURE: 120 MMHG | BODY MASS INDEX: 33.55 KG/M2 | WEIGHT: 189.38 LBS | HEIGHT: 63 IN | DIASTOLIC BLOOD PRESSURE: 74 MMHG

## 2022-11-21 DIAGNOSIS — R19.7 DIARRHEA, UNSPECIFIED: ICD-10-CM

## 2022-11-21 DIAGNOSIS — D64.81 ANEMIA DUE TO ANTINEOPLASTIC CHEMOTHERAPY: ICD-10-CM

## 2022-11-21 DIAGNOSIS — Z01.810 ENCOUNTER FOR PREPROCEDURAL CARDIOVASCULAR EXAMINATION: ICD-10-CM

## 2022-11-21 DIAGNOSIS — Z08 ENCNTR FOR FOLLOW-UP EXAM AFTER TRTMT FOR MALIGNANT NEOPLASM: ICD-10-CM

## 2022-11-21 DIAGNOSIS — T45.1X5A ANEMIA DUE TO ANTINEOPLASTIC CHEMOTHERAPY: ICD-10-CM

## 2022-11-21 DIAGNOSIS — Z79.899 OTHER LONG TERM (CURRENT) DRUG THERAPY: ICD-10-CM

## 2022-11-21 DIAGNOSIS — R11.2 NAUSEA WITH VOMITING, UNSPECIFIED: ICD-10-CM

## 2022-11-21 DIAGNOSIS — C50.111 MALIGNANT NEOPLASM OF CENTRAL PORTION OF RIGHT FEMALE BREAST (HCC): ICD-10-CM

## 2022-11-21 LAB
ECHO AV AREA PEAK VELOCITY: 1.5 CM2
ECHO AV AREA VTI: 1.8 CM2
ECHO AV AREA/BSA PEAK VELOCITY: 0.8 CM2/M2
ECHO AV AREA/BSA VTI: 1 CM2/M2
ECHO AV MEAN GRADIENT: 7 MMHG
ECHO AV MEAN VELOCITY: 1.3 M/S
ECHO AV PEAK GRADIENT: 11 MMHG
ECHO AV PEAK VELOCITY: 1.7 M/S
ECHO AV VELOCITY RATIO: 0.71
ECHO AV VTI: 39 CM
ECHO EST RA PRESSURE: 10 MMHG
ECHO LA DIAMETER INDEX: 1.85 CM/M2
ECHO LA DIAMETER: 3.5 CM
ECHO LA VOL 4C: 56 ML (ref 22–52)
ECHO LA VOLUME INDEX A4C: 30 ML/M2 (ref 16–34)
ECHO LV E' LATERAL VELOCITY: 11 CM/S
ECHO LV E' SEPTAL VELOCITY: 7 CM/S
ECHO LV FRACTIONAL SHORTENING: 28 % (ref 28–44)
ECHO LV INTERNAL DIMENSION DIASTOLE INDEX: 2.65 CM/M2
ECHO LV INTERNAL DIMENSION DIASTOLIC: 5 CM (ref 3.9–5.3)
ECHO LV INTERNAL DIMENSION SYSTOLIC INDEX: 1.9 CM/M2
ECHO LV INTERNAL DIMENSION SYSTOLIC: 3.6 CM
ECHO LV IVSD: 0.9 CM (ref 0.6–0.9)
ECHO LV MASS 2D: 146.8 G (ref 67–162)
ECHO LV MASS INDEX 2D: 77.7 G/M2 (ref 43–95)
ECHO LV POSTERIOR WALL DIASTOLIC: 0.8 CM (ref 0.6–0.9)
ECHO LV RELATIVE WALL THICKNESS RATIO: 0.32
ECHO LVOT AREA: 2.3 CM2
ECHO LVOT AV VTI INDEX: 0.82
ECHO LVOT DIAM: 1.7 CM
ECHO LVOT MEAN GRADIENT: 3 MMHG
ECHO LVOT PEAK GRADIENT: 5 MMHG
ECHO LVOT PEAK VELOCITY: 1.2 M/S
ECHO LVOT STROKE VOLUME INDEX: 38.5 ML/M2
ECHO LVOT SV: 72.8 ML
ECHO LVOT VTI: 32.1 CM
ECHO MV A VELOCITY: 1.08 M/S
ECHO MV AREA PHT: 4.4 CM2
ECHO MV E DECELERATION TIME (DT): 155.2 MS
ECHO MV E VELOCITY: 1.15 M/S
ECHO MV E/A RATIO: 1.06
ECHO MV E/E' LATERAL: 10.45
ECHO MV E/E' RATIO (AVERAGED): 13.44
ECHO MV E/E' SEPTAL: 16.43
ECHO MV PRESSURE HALF TIME (PHT): 50 MS
ECHO MV REGURGITANT ALIASING (NYQUIST) VELOCITY: 28 CM/S
ECHO MV REGURGITANT PEAK GRADIENT: 135 MMHG
ECHO MV REGURGITANT PEAK VELOCITY: 5.8 M/S
ECHO PULMONARY ARTERY END DIASTOLIC PRESSURE: 8 MMHG
ECHO PV MAX VELOCITY: 1.1 M/S
ECHO PV PEAK GRADIENT: 5 MMHG
ECHO PV REGURGITANT MAX VELOCITY: 1.3 M/S
ECHO RIGHT VENTRICULAR SYSTOLIC PRESSURE (RVSP): 65 MMHG
ECHO RV FREE WALL PEAK S': 10 CM/S
ECHO RV TAPSE: 1.7 CM (ref 1.7–?)
ECHO TV REGURGITANT MAX VELOCITY: 3.71 M/S
ECHO TV REGURGITANT PEAK GRADIENT: 56 MMHG

## 2022-11-21 PROCEDURE — 93306 TTE W/DOPPLER COMPLETE: CPT

## 2023-01-03 RX ORDER — AMLODIPINE BESYLATE 5 MG/1
TABLET ORAL
Qty: 90 TABLET | Refills: 3 | Status: SHIPPED | OUTPATIENT
Start: 2023-01-03

## 2023-02-09 RX ORDER — OMEPRAZOLE 40 MG/1
CAPSULE, DELAYED RELEASE ORAL
Qty: 60 CAPSULE | Refills: 0 | Status: SHIPPED | OUTPATIENT
Start: 2023-02-09

## 2023-02-09 NOTE — TELEPHONE ENCOUNTER
Last Refill: 3-11-22  Last Visit: 11/14/2022  Next Visit: 5-15-23    Requested Prescriptions     Pending Prescriptions Disp Refills    omeprazole (PRILOSEC) 40 mg capsule [Pharmacy Med Name: Omeprazole 40 MG Oral Capsule Delayed Release] 60 Capsule 0     Sig: Take 1 capsule by mouth twice daily

## 2023-02-14 ENCOUNTER — TRANSCRIBE ORDER (OUTPATIENT)
Dept: SCHEDULING | Age: 83
End: 2023-02-14

## 2023-02-14 DIAGNOSIS — R19.7 DIARRHEA, UNSPECIFIED: ICD-10-CM

## 2023-02-14 DIAGNOSIS — D64.81 ANEMIA DUE TO ANTINEOPLASTIC CHEMOTHERAPY: ICD-10-CM

## 2023-02-14 DIAGNOSIS — T45.1X5A ANEMIA DUE TO ANTINEOPLASTIC CHEMOTHERAPY: ICD-10-CM

## 2023-02-14 DIAGNOSIS — E61.1 IRON DEFICIENCY: ICD-10-CM

## 2023-02-14 DIAGNOSIS — Z79.899 OTHER LONG TERM (CURRENT) DRUG THERAPY: ICD-10-CM

## 2023-02-14 DIAGNOSIS — Z01.810 ENCOUNTER FOR PREPROCEDURAL CARDIOVASCULAR EXAMINATION: ICD-10-CM

## 2023-02-14 DIAGNOSIS — R11.2 NAUSEA WITH VOMITING, UNSPECIFIED: ICD-10-CM

## 2023-02-14 DIAGNOSIS — C50.111 MALIGNANT NEOPLASM OF CENTRAL PORTION OF RIGHT FEMALE BREAST (HCC): Primary | ICD-10-CM

## 2023-02-14 DIAGNOSIS — Z08 ENCOUNTER FOR FOLLOW-UP EXAMINATION AFTER COMPLETED TREATMENT FOR MALIGNANT NEOPLASM: ICD-10-CM

## 2023-03-03 ENCOUNTER — ANESTHESIA EVENT (OUTPATIENT)
Dept: ENDOSCOPY | Age: 83
End: 2023-03-03
Payer: MEDICARE

## 2023-03-03 ENCOUNTER — ANESTHESIA (OUTPATIENT)
Dept: ENDOSCOPY | Age: 83
End: 2023-03-03
Payer: MEDICARE

## 2023-03-03 ENCOUNTER — HOSPITAL ENCOUNTER (OUTPATIENT)
Age: 83
Setting detail: OUTPATIENT SURGERY
Discharge: HOME | End: 2023-03-03
Attending: INTERNAL MEDICINE | Admitting: INTERNAL MEDICINE
Payer: MEDICARE

## 2023-03-03 VITALS
HEART RATE: 77 BPM | HEIGHT: 62 IN | TEMPERATURE: 97.9 F | DIASTOLIC BLOOD PRESSURE: 48 MMHG | SYSTOLIC BLOOD PRESSURE: 141 MMHG | RESPIRATION RATE: 13 BRPM | OXYGEN SATURATION: 97 % | BODY MASS INDEX: 34.04 KG/M2 | WEIGHT: 185 LBS

## 2023-03-03 PROCEDURE — 2709999900 HC NON-CHARGEABLE SUPPLY: Performed by: INTERNAL MEDICINE

## 2023-03-03 PROCEDURE — 77030021593 HC FCPS BIOP ENDOSC BSC -A: Performed by: INTERNAL MEDICINE

## 2023-03-03 PROCEDURE — 76060000031 HC ANESTHESIA FIRST 0.5 HR: Performed by: INTERNAL MEDICINE

## 2023-03-03 PROCEDURE — 74011250636 HC RX REV CODE- 250/636: Performed by: INTERNAL MEDICINE

## 2023-03-03 PROCEDURE — 74011250636 HC RX REV CODE- 250/636: Performed by: NURSE ANESTHETIST, CERTIFIED REGISTERED

## 2023-03-03 PROCEDURE — 74011000250 HC RX REV CODE- 250: Performed by: NURSE ANESTHETIST, CERTIFIED REGISTERED

## 2023-03-03 PROCEDURE — 76040000019: Performed by: INTERNAL MEDICINE

## 2023-03-03 PROCEDURE — 88305 TISSUE EXAM BY PATHOLOGIST: CPT

## 2023-03-03 RX ORDER — EPINEPHRINE 0.1 MG/ML
1 INJECTION INTRACARDIAC; INTRAVENOUS
Status: DISCONTINUED | OUTPATIENT
Start: 2023-03-03 | End: 2023-03-03 | Stop reason: HOSPADM

## 2023-03-03 RX ORDER — MIDAZOLAM HYDROCHLORIDE 1 MG/ML
.25-5 INJECTION, SOLUTION INTRAMUSCULAR; INTRAVENOUS
Status: DISCONTINUED | OUTPATIENT
Start: 2023-03-03 | End: 2023-03-03 | Stop reason: HOSPADM

## 2023-03-03 RX ORDER — SODIUM CHLORIDE, SODIUM LACTATE, POTASSIUM CHLORIDE, CALCIUM CHLORIDE 600; 310; 30; 20 MG/100ML; MG/100ML; MG/100ML; MG/100ML
INJECTION, SOLUTION INTRAVENOUS
Status: DISCONTINUED | OUTPATIENT
Start: 2023-03-03 | End: 2023-03-03 | Stop reason: HOSPADM

## 2023-03-03 RX ORDER — ATROPINE SULFATE 0.1 MG/ML
0.5 INJECTION INTRAVENOUS
Status: DISCONTINUED | OUTPATIENT
Start: 2023-03-03 | End: 2023-03-03 | Stop reason: HOSPADM

## 2023-03-03 RX ORDER — NALOXONE HYDROCHLORIDE 0.4 MG/ML
0.4 INJECTION, SOLUTION INTRAMUSCULAR; INTRAVENOUS; SUBCUTANEOUS
Status: DISCONTINUED | OUTPATIENT
Start: 2023-03-03 | End: 2023-03-03 | Stop reason: HOSPADM

## 2023-03-03 RX ORDER — PROPOFOL 10 MG/ML
INJECTION, EMULSION INTRAVENOUS AS NEEDED
Status: DISCONTINUED | OUTPATIENT
Start: 2023-03-03 | End: 2023-03-03 | Stop reason: HOSPADM

## 2023-03-03 RX ORDER — SODIUM CHLORIDE 9 MG/ML
100 INJECTION, SOLUTION INTRAVENOUS CONTINUOUS
Status: DISCONTINUED | OUTPATIENT
Start: 2023-03-03 | End: 2023-03-03 | Stop reason: HOSPADM

## 2023-03-03 RX ORDER — LIDOCAINE HYDROCHLORIDE 20 MG/ML
INJECTION, SOLUTION EPIDURAL; INFILTRATION; INTRACAUDAL; PERINEURAL AS NEEDED
Status: DISCONTINUED | OUTPATIENT
Start: 2023-03-03 | End: 2023-03-03 | Stop reason: HOSPADM

## 2023-03-03 RX ORDER — FLUMAZENIL 0.1 MG/ML
0.2 INJECTION INTRAVENOUS
Status: DISCONTINUED | OUTPATIENT
Start: 2023-03-03 | End: 2023-03-03 | Stop reason: HOSPADM

## 2023-03-03 RX ORDER — SODIUM CHLORIDE 0.9 % (FLUSH) 0.9 %
5-40 SYRINGE (ML) INJECTION EVERY 8 HOURS
Status: DISCONTINUED | OUTPATIENT
Start: 2023-03-03 | End: 2023-03-03 | Stop reason: HOSPADM

## 2023-03-03 RX ORDER — SODIUM CHLORIDE 0.9 % (FLUSH) 0.9 %
5-40 SYRINGE (ML) INJECTION AS NEEDED
Status: DISCONTINUED | OUTPATIENT
Start: 2023-03-03 | End: 2023-03-03 | Stop reason: HOSPADM

## 2023-03-03 RX ORDER — EPHEDRINE SULFATE/0.9% NACL/PF 50 MG/5 ML
SYRINGE (ML) INTRAVENOUS AS NEEDED
Status: DISCONTINUED | OUTPATIENT
Start: 2023-03-03 | End: 2023-03-03 | Stop reason: HOSPADM

## 2023-03-03 RX ORDER — ONDANSETRON 2 MG/ML
INJECTION INTRAMUSCULAR; INTRAVENOUS
Status: DISCONTINUED
Start: 2023-03-03 | End: 2023-03-03 | Stop reason: HOSPADM

## 2023-03-03 RX ORDER — DEXTROMETHORPHAN/PSEUDOEPHED 2.5-7.5/.8
1.2 DROPS ORAL
Status: DISCONTINUED | OUTPATIENT
Start: 2023-03-03 | End: 2023-03-03 | Stop reason: HOSPADM

## 2023-03-03 RX ADMIN — PROPOFOL 100 MG: 10 INJECTION, EMULSION INTRAVENOUS at 09:55

## 2023-03-03 RX ADMIN — PROPOFOL 50 MG: 10 INJECTION, EMULSION INTRAVENOUS at 10:06

## 2023-03-03 RX ADMIN — PROPOFOL 100 MG: 10 INJECTION, EMULSION INTRAVENOUS at 10:00

## 2023-03-03 RX ADMIN — Medication 7.5 MG: at 10:04

## 2023-03-03 RX ADMIN — SODIUM CHLORIDE 100 ML/HR: 9 INJECTION, SOLUTION INTRAVENOUS at 09:16

## 2023-03-03 RX ADMIN — LIDOCAINE HYDROCHLORIDE 100 MG: 20 INJECTION, SOLUTION INTRAVENOUS at 09:55

## 2023-03-03 RX ADMIN — SODIUM CHLORIDE, SODIUM LACTATE, POTASSIUM CHLORIDE, AND CALCIUM CHLORIDE: 600; 310; 30; 20 INJECTION, SOLUTION INTRAVENOUS at 09:53

## 2023-03-03 NOTE — ANESTHESIA PREPROCEDURE EVALUATION
Anesthetic History     PONV          Review of Systems / Medical History  Patient summary reviewed, nursing notes reviewed and pertinent labs reviewed    Pulmonary  Within defined limits                 Neuro/Psych   Within defined limits           Cardiovascular    Hypertension              Exercise tolerance: >4 METS  Comments: Ejection fraction was  estimated to be 65 % in the range of 60 % to 65 %.     GI/Hepatic/Renal           PUD     Endo/Other        Obesity, cancer and anemia  Pertinent negatives: No morbid obesity   Other Findings   Comments: Breast CA           Physical Exam    Airway  Mallampati: III  TM Distance: 4 - 6 cm  Neck ROM: normal range of motion   Mouth opening: Normal     Cardiovascular  Regular rate and rhythm,  S1 and S2 normal,  no murmur, click, rub, or gallop             Dental         Pulmonary  Breath sounds clear to auscultation               Abdominal  GI exam deferred       Other Findings            Anesthetic Plan    ASA: 3  Anesthesia type: total IV anesthesia and general          Induction: Intravenous  Anesthetic plan and risks discussed with: Patient      Propofol MAC/Zofran

## 2023-03-03 NOTE — H&P
Willy Pond MD  [facility], [address] [place] [address]  [phone]  [email or web address]      Gastroenterology Outpatient History and Physical    Patient: Shar Bautista    Physician: Shreya Brown MD    Vital Signs: Blood pressure (!) 196/53, pulse 85, temperature 98 °F (36.7 °C), resp. rate 22, height 5' 2\" (1.575 m), weight 83.9 kg (185 lb), SpO2 99 %, not currently breastfeeding. Allergies: Allergies   Allergen Reactions    [place] Cough Drops [Menthol] Swelling     Mouth swelling, had to go to ER    Bee Sting [Sting, Bee] Swelling    Sulfa (Sulfonamide Antibiotics) Hives       Chief Complaint: rectal bleeding and change in bowel habits    History of Present Illness: Here for colonoscopy to evaluate some rectal bleeding, change in bowels with fecal incontinence.  Has never had a colonoscopy    History:  Past Medical History:   Diagnosis Date    Breast cancer, right ([facility] [address] Dx 4/12/16    Dr Dana Hussein    Exudative age-related macular degeneration of both eyes with active choroidal neovascularization ([facility] [address] 01/29/2021    HTN (hypertension), benign 09/22/2017    Hypertension     Ill-defined condition     macular degeneration    Motion sickness     Nausea & vomiting     Osteopenia of multiple sites 01/29/2021    Pure hypercholesterolemia 01/29/2021      Past Surgical History:   Procedure Laterality Date    HX CATARACT REMOVAL Bilateral 1997 2005    HX ORTHOPAEDIC Left 2008    part knee replacement    UPPER GI ENDOSCOPY,BIOPSY  6/20/2016         UPPER GI ENDOSCOPY,CTRL BLEED  6/20/2016           Social History     Socioeconomic History    Marital status:    Tobacco Use    Smoking status: Never    Smokeless tobacco: Never   Vaping Use    Vaping Use: Never used   Substance and Sexual Activity    Alcohol use: Yes     Comment:  occasional    Drug use: Not Currently      Family History   Problem Relation Age of Onset Heart Disease Mother     Hypertension Mother       Patient Active Problem List   Diagnosis Code    Inversion, nipple right N64.59    Carcinoma of right breast metastatic to lung [facility]) C50.911, C78.00    Acute upper GI bleed K92.2    Acute blood loss anemia D62    Nausea & vomiting R11.2    Pre-syncope R55    Hypovolemia E86.1    Anemia D64.9    Breast cancer, right (HCC) C50.911    Cellulitis L03.90    Duodenal ulcer K26.9    Fatigue R53.83    History of total knee replacement Z96.659    History of uterine fibroid Z86.018    Hyperlipidemia, acquired E78.5    Neuralgia M79.2    Nipple retraction N64.53    Post-menopausal Z78.0    Sialoadenitis K11.20    HTN (hypertension), benign I10    Severe obesity (HCC) E66.01    Pure hypercholesterolemia E78.00    Osteopenia of multiple sites M85.89    Exudative age-related macular degeneration of both eyes with active choroidal neovascularization ([facility] [address] H35.3238         Medications:   Prior to Admission medications    Medication Sig Start Date End Date Taking? Authorizing Provider   omeprazole (PRILOSEC) 40 mg capsule Take 1 capsule by mouth twice daily 2/9/23  Yes Zuly Alfred MD   amLODIPine (NORVASC) 5 mg tablet TAKE 1 TABLET EVERY DAY 1/3/23  Yes Seema Newman MD   vit A/vit C/vit E/zinc/copper (PRESERVISION AREDS PO) Take  by mouth daily. Yes Provider, Historical   atenoloL (TENORMIN) 25 mg tablet TAKE 1 TABLET TWICE DAILY 9/19/22  Yes Seema Newman MD   OTHER Indications: circulation and vein support   Yes Provider, Historical   cholecalciferol (VITAMIN D3) 25 mcg (1,000 unit) cap Take  by mouth daily. Yes Provider, Historical   trastuzumab (HERCEPTIN IV) by IntraVENous route. Yes Provider, Historical   multivitamin (ONE A DAY) tablet Take 1 Tab by mouth daily. Yes Provider, Historical   aspirin delayed-release 81 mg tablet Take  by mouth daily.   Patient not taking: Reported on 3/3/2023    Provider, Historical       Physical Exam:     General: well developed, well nourished   HEENT: unremarkable   Heart: regular rhythm no mumur    Lungs: clear   Abdominal:  benign   Neurological: unremarkable   Extremities: no edema     Findings/Diagnosis: Rectal bleeding, fecal incontinence    Plan of Care/Planned Procedure: Colonoscopy with  monitored anesthesia care sedation       Signed:  Funmi Regan MD 3/3/2023

## 2023-03-03 NOTE — ANESTHESIA POSTPROCEDURE EVALUATION
Procedure(s):  COLONOSCOPY WITH BIOPSY AND POLYPECTOMY  COLON BIOPSY. total IV anesthesia, general    Anesthesia Post Evaluation        Patient location during evaluation: PACU  Note status: Adequate. Level of consciousness: responsive to verbal stimuli and sleepy but conscious  Pain management: satisfactory to patient  Airway patency: patent  Anesthetic complications: no  Cardiovascular status: acceptable  Respiratory status: acceptable  Hydration status: acceptable  Comments: +Post-Anesthesia Evaluation and Assessment    Patient: Arnold Dacosta MRN: [id number]  SSN: [id number]   YOB: 1940  Age: 81 y.o. Sex: female      Cardiovascular Function/Vital Signs    BP (!) 141/48   Pulse 77   Temp 36.6 °C (97.9 °F)   Resp 13   Ht 5' 2\" (1.575 m)   Wt 83.9 kg (185 lb)   SpO2 97%   Breastfeeding No   BMI 33.84 kg/m²     Patient is status post Procedure(s):  COLONOSCOPY WITH BIOPSY AND POLYPECTOMY  COLON BIOPSY. Nausea/Vomiting: Controlled. Postoperative hydration reviewed and adequate. Pain:  Pain Scale 1: Numeric (0 - 10) (03/03/23 1124)  Pain Intensity 1: 0 (03/03/23 1124)   Managed. Neurological Status: At baseline. Mental Status and Level of Consciousness: Arousable. Pulmonary Status:   O2 Device: None (Room air) (03/03/23 1124)   Adequate oxygenation and airway patent. Complications related to anesthesia: None    Post-anesthesia assessment completed. No concerns. Signed By: Lyle Caballero MD    3/3/2023  Post anesthesia nausea and vomiting:  controlled      INITIAL Post-op Vital signs:   Vitals Value Taken Time   /48 03/03/23 1124   Temp 36.6 °C (97.9 °F) 03/03/23 1026   Pulse 78 03/03/23 1125   Resp 15 03/03/23 1125   SpO2 97 % 03/03/23 1125   Vitals shown include unvalidated device data.

## 2023-03-03 NOTE — PROCEDURES
[facility]                  Colonoscopy Operative Report    3/3/2023      Keith Khoury  [id number]  1940    Procedure Type:   Colonoscopy with biopsy     Indications:    Rectal bleeding, fecal incontinence      Pre-operative Diagnosis: see indication above    Post-operative Diagnosis:  See findings below    :  Bahman Zamorano MD    Referring Provider: Magdy Jonas MD      Sedation:  MAC anesthesia Propofol    Pre-Procedural Exam:      Airway: clear,  No airway problems anticipated  Heart: RRR, without gallops or rubs  Lungs: clear bilaterally without wheezes, crackles, or rhonchi  Abdomen: soft, nontender, nondistended, bowel sounds present  Mental Status: awake, alert and oriented to person, place and time     Procedure Details:  After informed consent was obtained with all risks and benefits of procedure explained and preoperative exam completed, the patient was taken to the endoscopy suite and placed in the left lateral decubitus position. Upon sequential sedation as per above, a digital rectal exam was performed . The Olympus videocolonoscope  was inserted in the rectum and carefully advanced to the cecum, which was identified by the ileocecal valve and appendiceal orifice. The cecum was identified by the ileocecal valve and appendiceal orifice. The quality of preparation was good. The colonoscope was slowly withdrawn with careful evaluation between folds. Retroflexion in the rectum was completed demonstrating internal hemorrhoids. Findings:   Rectum:  large 8 to 10 cm exophytic friable circumferential mass begins at the junction of mid and distal rectum and extends to upper rectum. Biopsied.  Medium sized internal hemorrhoids  Sigmoid:     - Diverticulosis  Descending Colon: normal  Transverse Colon: normal  Ascending Colon: normal  Cecum: normal  Terminal Ileum: not intubated      Specimen Removed:   Biopsies of rectal mass    Complications: None.     EBL:  None. Impression:     Malignant appearing rectal mass as above; left sided diverticulosis and internal hemorrhoids    Recommendations: --Await pathology. Refer to medical oncology and colorectal surgery. Discharge Disposition:  Home in the company of a  when able to ambulate. Samuel Shields MD    3/3/2023     SUE Black MD  [facility], [address] [place] [address]  [phone]  [email or web address]

## 2023-03-03 NOTE — PERIOP NOTES
Endoscopy Case End Note:    1012:  Procedure scope was pre-cleaned, per protocol, at bedside by DANIELLE Ward. 1016:  Report received from anesthesia - ANGEL Zepeda CRNA. See anesthesia flowsheet for intra-procedure vital signs and events.

## 2023-03-03 NOTE — DISCHARGE INSTRUCTIONS
Odell Piedra MD  [facility], [address]  [place], [address] [place] [address]  [phone]  [email or web address]    Krishan Bruner  [id number]  1940    COLON DISCHARGE INSTRUCTIONS  Discomfort:  Redness at IV site- apply warm compress to area; if redness or soreness persist- contact your physician  There may be a slight amount of blood passed from the rectum  Gaseous discomfort- walking, belching will help relieve any discomfort  You may not operate a vehicle for 12 hours  You may not engage in an occupation involving machinery or appliances for rest of today  You may not drink alcoholic beverages for at least 12 hours  Avoid making any critical decisions for at least 24 hour  DIET:   Regular diet. - however -  remember your colon is empty and a heavy meal will produce gas. Avoid these foods:  vegetables, fried / greasy foods, carbonated drinks for today      ACTIVITY:  You may resume your normal daily activities it is recommended that you spend the remainder of the day resting -  avoid any strenuous activity. CALL M.D. ANY SIGN OF:   Increasing pain, nausea, vomiting  Abdominal distension (swelling)  New increased bleeding (oral or rectal)  Fever (chills)  Pain in chest area  Bloody discharge from nose or mouth  Shortness of breath     COLONOSCOPY FINDINGS:  Your colonoscopy showed: a mass in the rectum which was biopsied. .    Follow-up Instructions:   Call Dr. Odell Piedra if any questions or problems. Telephone # [phone]  Dr. Jaqui Triana office will notify you of the biopsy results within 7 to 10 days.   Will need to see Dr Alison Acharya and a colorectal surgeon   Patient Education on Sedation / Analgesia Administered for Procedure      For 24 hours after general anesthesia or intravenous analgesia / sedation:  Have someone responsible help you with your care  Limit your activities  Do not drive and operate hazardous machinery  Do not make important personal, legal or business decisions  Do not drink alcoholic beverages  If you have not urinated within 8 hours after discharge, please contact your physician  Resume your medications unless otherwise instructed    For 24 hours after general anesthesia or intravenous analgesia / sedation  you may experience:  Drowsiness, dizziness, sleepiness, or confusion  Difficulty remembering or delayed reaction times  Difficulty with your balance, especially while walking, move slowly and carefully, do not make sudden position changes  Difficulty focusing or blurred vision    You may not be aware of slight changes in your behavior and/or your reaction time because of the medication used during and after your procedure.     Report the following to your physician:  Excessive pain, swelling, redness or odor of or around the surgical area  Temperature over 100.5  Nausea and vomiting lasting longer than 4 hours or if unable to take medications  Any signs of decreased circulation or nerve impairment to extremity: change in color, persistent numbness, tingling, coldness or increase pain  Any questions or concerns    IF YOU REPORT TO AN EMERGENCY ROOM, DOCTOR'S OFFICE OR HOSPITAL WITHIN 24 HOURS AFTER YOUR PROCEDURE, BRING THIS SHEET AND YOUR AFTER VISIT SUMMARY WITH YOU AND GIVE IT TO THE PHYSICIAN OR NURSE ATTENDING YOU.

## 2023-03-03 NOTE — PROGRESS NOTES
Gino Hollis [facility]  1940  [id number]    Situation:  Verbal report received from: Jennifer Ba RN  Procedure: Procedure(s):  COLONOSCOPY WITH BIOPSY AND POLYPECTOMY  COLON BIOPSY    Background:    Preoperative diagnosis: Incontinence of feces, unspecified fecal incontinence type [R15.9]  Postoperative diagnosis: rectal mass    :  Dr. Gregoria Silveira  Assistant(s): Endoscopy Technician-1: Rose Puentes  Endoscopy RN-1: Mery Martinez RN    Specimens:   ID Type Source Tests Collected by Time Destination   1 : rectal mass bx Preservative Rectal  Abel Polk MD 3/3/2023 1008 Pathology     H. Pylori  no    Assessment:  Intra-procedure medications   Anesthesia gave intra-procedure sedation and medications, see anesthesia flow sheet yes    Intravenous fluids: NS@ KVO     Vital signs stable yes    Abdominal assessment: round and soft yes    Recommendation:  Discharge patient per MD order yes.   Return to floor n/a  Family or Friend harriet Mariano  Permission to share finding with family or friend yes

## 2023-03-06 ENCOUNTER — TRANSCRIBE ORDER (OUTPATIENT)
Dept: SCHEDULING | Age: 83
End: 2023-03-06

## 2023-03-07 ENCOUNTER — TRANSCRIBE ORDER (OUTPATIENT)
Dept: SCHEDULING | Age: 83
End: 2023-03-07

## 2023-03-08 ENCOUNTER — TRANSCRIBE ORDER (OUTPATIENT)
Dept: SCHEDULING | Age: 83
End: 2023-03-08

## 2023-03-09 ENCOUNTER — TRANSCRIBE ORDER (OUTPATIENT)
Dept: SCHEDULING | Age: 83
End: 2023-03-09

## 2023-03-20 ENCOUNTER — HOSPITAL ENCOUNTER (OUTPATIENT)
Dept: NON INVASIVE DIAGNOSTICS | Age: 83
Discharge: HOME | End: 2023-03-20
Attending: INTERNAL MEDICINE
Payer: MEDICARE

## 2023-03-20 VITALS
DIASTOLIC BLOOD PRESSURE: 48 MMHG | HEIGHT: 62 IN | SYSTOLIC BLOOD PRESSURE: 141 MMHG | BODY MASS INDEX: 34.04 KG/M2 | WEIGHT: 185 LBS

## 2023-03-20 DIAGNOSIS — R11.2 NAUSEA WITH VOMITING, UNSPECIFIED: ICD-10-CM

## 2023-03-20 DIAGNOSIS — Z08 ENCOUNTER FOR FOLLOW-UP EXAMINATION AFTER COMPLETED TREATMENT FOR MALIGNANT NEOPLASM: ICD-10-CM

## 2023-03-20 DIAGNOSIS — T45.1X5A ANEMIA DUE TO ANTINEOPLASTIC CHEMOTHERAPY: ICD-10-CM

## 2023-03-20 DIAGNOSIS — E61.1 IRON DEFICIENCY: ICD-10-CM

## 2023-03-20 DIAGNOSIS — D64.81 ANEMIA DUE TO ANTINEOPLASTIC CHEMOTHERAPY: ICD-10-CM

## 2023-03-20 DIAGNOSIS — Z01.810 ENCOUNTER FOR PREPROCEDURAL CARDIOVASCULAR EXAMINATION: ICD-10-CM

## 2023-03-20 DIAGNOSIS — Z79.899 OTHER LONG TERM (CURRENT) DRUG THERAPY: ICD-10-CM

## 2023-03-20 DIAGNOSIS — C50.111 MALIGNANT NEOPLASM OF CENTRAL PORTION OF RIGHT FEMALE BREAST (HCC): ICD-10-CM

## 2023-03-20 DIAGNOSIS — R19.7 DIARRHEA, UNSPECIFIED: ICD-10-CM

## 2023-03-20 LAB
ECHO AV AREA PEAK VELOCITY: 1.5 CM2
ECHO AV AREA VTI: 1.4 CM2
ECHO AV AREA/BSA PEAK VELOCITY: 0.8 CM2/M2
ECHO AV AREA/BSA VTI: 0.8 CM2/M2
ECHO AV MEAN GRADIENT: 6 MMHG
ECHO AV MEAN VELOCITY: 1.2 M/S
ECHO AV PEAK GRADIENT: 12 MMHG
ECHO AV PEAK VELOCITY: 1.7 M/S
ECHO AV VELOCITY RATIO: 0.59
ECHO AV VTI: 41.4 CM
ECHO LA DIAMETER INDEX: 2.11 CM/M2
ECHO LA DIAMETER: 3.9 CM
ECHO LV EDV A4C: 100 ML
ECHO LV EDV INDEX A4C: 54 ML/M2
ECHO LV EJECTION FRACTION A4C: 57 %
ECHO LV ESV A4C: 43 ML
ECHO LV ESV INDEX A4C: 23 ML/M2
ECHO LV FRACTIONAL SHORTENING: 37 % (ref 28–44)
ECHO LV INTERNAL DIMENSION DIASTOLE INDEX: 2.32 CM/M2
ECHO LV INTERNAL DIMENSION DIASTOLIC: 4.3 CM (ref 3.9–5.3)
ECHO LV INTERNAL DIMENSION SYSTOLIC INDEX: 1.46 CM/M2
ECHO LV INTERNAL DIMENSION SYSTOLIC: 2.7 CM
ECHO LV IVSD: 1.3 CM (ref 0.6–0.9)
ECHO LV MASS 2D: 184.7 G (ref 67–162)
ECHO LV MASS INDEX 2D: 99.8 G/M2 (ref 43–95)
ECHO LV POSTERIOR WALL DIASTOLIC: 1.1 CM (ref 0.6–0.9)
ECHO LV RELATIVE WALL THICKNESS RATIO: 0.51
ECHO LVOT AREA: 2.5 CM2
ECHO LVOT AV VTI INDEX: 0.59
ECHO LVOT DIAM: 1.8 CM
ECHO LVOT MEAN GRADIENT: 3 MMHG
ECHO LVOT PEAK GRADIENT: 4 MMHG
ECHO LVOT PEAK VELOCITY: 1 M/S
ECHO LVOT STROKE VOLUME INDEX: 33.7 ML/M2
ECHO LVOT SV: 62.3 ML
ECHO LVOT VTI: 24.5 CM
ECHO PV MAX VELOCITY: 1 M/S
ECHO PV PEAK GRADIENT: 4 MMHG
ECHO TV REGURGITANT MAX VELOCITY: 2.7 M/S
ECHO TV REGURGITANT PEAK GRADIENT: 29 MMHG

## 2023-03-20 PROCEDURE — 93308 TTE F-UP OR LMTD: CPT

## 2023-03-20 PROCEDURE — 93308 TTE F-UP OR LMTD: CPT | Performed by: INTERNAL MEDICINE

## 2023-03-20 PROCEDURE — 93356 MYOCRD STRAIN IMG SPCKL TRCK: CPT | Performed by: INTERNAL MEDICINE

## 2023-04-07 ENCOUNTER — HOSPITAL ENCOUNTER (OUTPATIENT)
Dept: PREADMISSION TESTING | Age: 83
End: 2023-04-07
Attending: SURGERY
Payer: MEDICARE

## 2023-04-07 ENCOUNTER — HOSPITAL ENCOUNTER (OUTPATIENT)
Dept: GENERAL RADIOLOGY | Age: 83
End: 2023-04-07
Attending: SURGERY
Payer: MEDICARE

## 2023-04-10 NOTE — ADVANCED PRACTICE NURSE
4/10/23 Nasal cx + MSSA. Rx for Mupiroicin ointment escribed to pt's pharmacy for pt to start today BID x 5 days to B nostrils. CULTURE, MRSA [[id number]] (Order [id number])  Microbiology  Date: 4/7/2023 Department: Mrm Or Preadmit Tsting Released By: Sophia Granger RN (auto-released) Authorizing: Reese Adam MD     CULTURE, MRSA  Order: [id number]  Collected 4/7/2023 09:26     Status: Final result     Specimen Information: Nares; Nasal   0 Result Notes        Component Ref Range & Units 4/7/23 0926 Resulting Agency   Special Requests:   NO SPECIAL REQUESTS  [facility]   Culture result:   MRSA NOT PRESENT. Apparent Staphylococus aureus (not MRSA noted). [address]   Culture result:   Screening of patient nares for MRSA is for surveillance purposes and, if positive, to facilitate isolation considerations in high risk settings. It is not intended for automatic decolonization interventions per se as regimens are not sufficiently effective to warrant routine use. [address]              Specimen Collected: 04/07/23 09:26 Last Resulted: 04/08/23 13:29       Order Details     Lab and Collection Details     Routing     Result History     View Encounter Conversation           Result Care Coordination      Patient Communication     Released  Not seen Back to Top           Collection Information    Specimen ID: [id number]    Nasal    Nares       Collected: 4/7/2023  9:26 AM    [id number]    Resulting Agency: [facility] AT [facility]. Xiomara Cardona,    [address] [place] [address] [address] [facility] [address]        Lab Information    Lab   [facility] AT [facility] Xiomara Cardona,    [address] [place] [address] [address] [facility] [address]                Lab Inquiry View [facility] Lab Information       CULTURE, MRSA: Patient Communication     Released  Not seen     Provider Information    Ordering User Ordering Provider Authorizing Provider   Vera Reynolds RN MD Tierra Ortiz MD   Attending Provider(s) PCP   MD Tim Ortiz MD     How to build your own [facility], MRSA (Order #[id number]) on 4/7/23     Tracking Links    Cosign Tracking Order Transmittal Tracking

## 2023-04-10 NOTE — PERIOP NOTES
Called and instructed the patient to  prescription for Mupirocin ointment called into her preferred pharmacy and to start today. Patient verbalized understanding.

## 2023-04-13 ENCOUNTER — HOSPITAL ENCOUNTER (INPATIENT)
Age: 83
LOS: 2 days | Discharge: HOME | DRG: 333 | End: 2023-04-15
Attending: SURGERY | Admitting: SURGERY
Payer: MEDICARE

## 2023-04-13 DIAGNOSIS — C20 RECTAL CANCER (HCC): Primary | ICD-10-CM

## 2023-04-13 PROCEDURE — 77030035029 HC NDL INSUF VERES DISP COVD -B: Performed by: SURGERY

## 2023-04-13 PROCEDURE — 77030031139 HC SUT VCRL2 J&J -A: Performed by: SURGERY

## 2023-04-13 PROCEDURE — C1758 CATHETER, URETERAL: HCPCS | Performed by: SURGERY

## 2023-04-13 PROCEDURE — 77030002986 HC SUT PROL J&J -A: Performed by: SURGERY

## 2023-04-13 PROCEDURE — 74011250636 HC RX REV CODE- 250/636: Performed by: SURGERY

## 2023-04-13 PROCEDURE — 77030035277 HC OBTRTR BLDELSS DISP INTU -B: Performed by: SURGERY

## 2023-04-13 PROCEDURE — 77030035279 HC SEAL VSL ENDOWR XI INTU -I2: Performed by: SURGERY

## 2023-04-13 PROCEDURE — 77030005513 HC CATH URETH FOL11 MDII -B: Performed by: SURGERY

## 2023-04-13 PROCEDURE — 77030040923 HC STPLR ENDOSC ECHELON J&J -E: Performed by: SURGERY

## 2023-04-13 PROCEDURE — 77030002966 HC SUT PDS J&J -A: Performed by: SURGERY

## 2023-04-13 PROCEDURE — 2709999900 HC NON-CHARGEABLE SUPPLY: Performed by: SURGERY

## 2023-04-13 PROCEDURE — 77030002933 HC SUT MCRYL J&J -A: Performed by: SURGERY

## 2023-04-13 PROCEDURE — 8E0W4CZ ROBOTIC ASSISTED PROCEDURE OF TRUNK REGION, PERCUTANEOUS ENDOSCOPIC APPROACH: ICD-10-PCS | Performed by: SURGERY

## 2023-04-13 PROCEDURE — 77030035278 HC STPLR SEAL ENDOWR INTU -B: Performed by: SURGERY

## 2023-04-13 PROCEDURE — 74011250636 HC RX REV CODE- 250/636: Performed by: STUDENT IN AN ORGANIZED HEALTH CARE EDUCATION/TRAINING PROGRAM

## 2023-04-13 PROCEDURE — 74011000250 HC RX REV CODE- 250: Performed by: SURGERY

## 2023-04-13 PROCEDURE — 77030020703 HC SEAL CANN DISP INTU -B: Performed by: SURGERY

## 2023-04-13 PROCEDURE — 77030025171 HC FCPS ENDOSC DISP 2 J&J -B: Performed by: SURGERY

## 2023-04-13 PROCEDURE — 88305 TISSUE EXAM BY PATHOLOGIST: CPT

## 2023-04-13 PROCEDURE — 76210000016 HC OR PH I REC 1 TO 1.5 HR: Performed by: SURGERY

## 2023-04-13 PROCEDURE — 76010000878 HC OR TIME 3 TO 3.5HR INTENSV - TIER 2: Performed by: SURGERY

## 2023-04-13 PROCEDURE — 0DBP4ZZ EXCISION OF RECTUM, PERCUTANEOUS ENDOSCOPIC APPROACH: ICD-10-PCS | Performed by: SURGERY

## 2023-04-13 PROCEDURE — 0T9B80Z DRAINAGE OF BLADDER WITH DRAINAGE DEVICE, VIA NATURAL OR ARTIFICIAL OPENING ENDOSCOPIC: ICD-10-PCS | Performed by: UROLOGY

## 2023-04-13 PROCEDURE — 74011250637 HC RX REV CODE- 250/637: Performed by: SURGERY

## 2023-04-13 PROCEDURE — 76060000037 HC ANESTHESIA 3 TO 3.5 HR: Performed by: SURGERY

## 2023-04-13 PROCEDURE — 77030032522 HC SHT STPL PK ENDOWR INTU -B: Performed by: SURGERY

## 2023-04-13 PROCEDURE — 77030016151 HC PROTCTR LNS DFOG COVD -B: Performed by: SURGERY

## 2023-04-13 PROCEDURE — 74011250637 HC RX REV CODE- 250/637: Performed by: ANESTHESIOLOGY

## 2023-04-13 PROCEDURE — 2709999900 HC NON-CHARGEABLE SUPPLY

## 2023-04-13 PROCEDURE — 65270000029 HC RM PRIVATE

## 2023-04-13 PROCEDURE — 0DBN4ZX EXCISION OF SIGMOID COLON, PERCUTANEOUS ENDOSCOPIC APPROACH, DIAGNOSTIC: ICD-10-PCS | Performed by: SURGERY

## 2023-04-13 PROCEDURE — 77030008756 HC TU IRR SUC STRY -B: Performed by: SURGERY

## 2023-04-13 PROCEDURE — 77030018673: Performed by: SURGERY

## 2023-04-13 PROCEDURE — 88309 TISSUE EXAM BY PATHOLOGIST: CPT

## 2023-04-13 PROCEDURE — 77030032523 HC RELD STPL PK ENDORS INTU -C: Performed by: SURGERY

## 2023-04-13 RX ORDER — MIDAZOLAM HYDROCHLORIDE 1 MG/ML
0.5 INJECTION, SOLUTION INTRAMUSCULAR; INTRAVENOUS
Status: DISCONTINUED | OUTPATIENT
Start: 2023-04-13 | End: 2023-04-13 | Stop reason: HOSPADM

## 2023-04-13 RX ORDER — SODIUM CHLORIDE 0.9 % (FLUSH) 0.9 %
5-40 SYRINGE (ML) INJECTION AS NEEDED
Status: DISCONTINUED | OUTPATIENT
Start: 2023-04-13 | End: 2023-04-15 | Stop reason: HOSPADM

## 2023-04-13 RX ORDER — ONDANSETRON 4 MG/1
4 TABLET, ORALLY DISINTEGRATING ORAL
Status: DISCONTINUED | OUTPATIENT
Start: 2023-04-13 | End: 2023-04-15 | Stop reason: HOSPADM

## 2023-04-13 RX ORDER — OXYCODONE HYDROCHLORIDE 5 MG/1
10 TABLET ORAL
Status: DISCONTINUED | OUTPATIENT
Start: 2023-04-13 | End: 2023-04-15 | Stop reason: HOSPADM

## 2023-04-13 RX ORDER — SODIUM CHLORIDE 0.9 % (FLUSH) 0.9 %
5-40 SYRINGE (ML) INJECTION EVERY 8 HOURS
Status: DISCONTINUED | OUTPATIENT
Start: 2023-04-13 | End: 2023-04-15 | Stop reason: HOSPADM

## 2023-04-13 RX ORDER — HYDROMORPHONE HYDROCHLORIDE 1 MG/ML
0.5 INJECTION, SOLUTION INTRAMUSCULAR; INTRAVENOUS; SUBCUTANEOUS
Status: DISCONTINUED | OUTPATIENT
Start: 2023-04-13 | End: 2023-04-15 | Stop reason: HOSPADM

## 2023-04-13 RX ORDER — MIDAZOLAM HYDROCHLORIDE 1 MG/ML
1 INJECTION, SOLUTION INTRAMUSCULAR; INTRAVENOUS AS NEEDED
Status: DISCONTINUED | OUTPATIENT
Start: 2023-04-13 | End: 2023-04-13 | Stop reason: HOSPADM

## 2023-04-13 RX ORDER — SODIUM CHLORIDE 0.9 % (FLUSH) 0.9 %
5-40 SYRINGE (ML) INJECTION AS NEEDED
Status: DISCONTINUED | OUTPATIENT
Start: 2023-04-13 | End: 2023-04-13 | Stop reason: HOSPADM

## 2023-04-13 RX ORDER — MORPHINE SULFATE 4 MG/ML
2 INJECTION INTRAVENOUS
Status: DISCONTINUED | OUTPATIENT
Start: 2023-04-13 | End: 2023-04-13 | Stop reason: HOSPADM

## 2023-04-13 RX ORDER — ATENOLOL 25 MG/1
25 TABLET ORAL 2 TIMES DAILY
Status: DISCONTINUED | OUTPATIENT
Start: 2023-04-13 | End: 2023-04-15 | Stop reason: HOSPADM

## 2023-04-13 RX ORDER — LIDOCAINE HYDROCHLORIDE 10 MG/ML
0.1 INJECTION, SOLUTION EPIDURAL; INFILTRATION; INTRACAUDAL; PERINEURAL AS NEEDED
Status: DISCONTINUED | OUTPATIENT
Start: 2023-04-13 | End: 2023-04-13 | Stop reason: HOSPADM

## 2023-04-13 RX ORDER — ONDANSETRON 2 MG/ML
4 INJECTION INTRAMUSCULAR; INTRAVENOUS AS NEEDED
Status: DISCONTINUED | OUTPATIENT
Start: 2023-04-13 | End: 2023-04-13 | Stop reason: HOSPADM

## 2023-04-13 RX ORDER — SODIUM CHLORIDE 9 MG/ML
25 INJECTION, SOLUTION INTRAVENOUS CONTINUOUS
Status: DISCONTINUED | OUTPATIENT
Start: 2023-04-13 | End: 2023-04-13 | Stop reason: HOSPADM

## 2023-04-13 RX ORDER — DIPHENHYDRAMINE HYDROCHLORIDE 50 MG/ML
12.5 INJECTION, SOLUTION INTRAMUSCULAR; INTRAVENOUS AS NEEDED
Status: DISCONTINUED | OUTPATIENT
Start: 2023-04-13 | End: 2023-04-13 | Stop reason: HOSPADM

## 2023-04-13 RX ORDER — SODIUM CHLORIDE, SODIUM LACTATE, POTASSIUM CHLORIDE, CALCIUM CHLORIDE 600; 310; 30; 20 MG/100ML; MG/100ML; MG/100ML; MG/100ML
100 INJECTION, SOLUTION INTRAVENOUS CONTINUOUS
Status: DISCONTINUED | OUTPATIENT
Start: 2023-04-13 | End: 2023-04-13 | Stop reason: HOSPADM

## 2023-04-13 RX ORDER — AMLODIPINE BESYLATE 5 MG/1
5 TABLET ORAL EVERY EVENING
Status: DISCONTINUED | OUTPATIENT
Start: 2023-04-13 | End: 2023-04-15 | Stop reason: HOSPADM

## 2023-04-13 RX ORDER — ACETAMINOPHEN 325 MG/1
650 TABLET ORAL EVERY 6 HOURS
Status: DISCONTINUED | OUTPATIENT
Start: 2023-04-13 | End: 2023-04-15 | Stop reason: HOSPADM

## 2023-04-13 RX ORDER — FENTANYL CITRATE 50 UG/ML
50 INJECTION, SOLUTION INTRAMUSCULAR; INTRAVENOUS AS NEEDED
Status: DISCONTINUED | OUTPATIENT
Start: 2023-04-13 | End: 2023-04-13 | Stop reason: HOSPADM

## 2023-04-13 RX ORDER — BUPIVACAINE HYDROCHLORIDE AND EPINEPHRINE 2.5; 5 MG/ML; UG/ML
INJECTION, SOLUTION EPIDURAL; INFILTRATION; INTRACAUDAL; PERINEURAL AS NEEDED
Status: DISCONTINUED | OUTPATIENT
Start: 2023-04-13 | End: 2023-04-13 | Stop reason: HOSPADM

## 2023-04-13 RX ORDER — ONDANSETRON 2 MG/ML
4 INJECTION INTRAMUSCULAR; INTRAVENOUS
Status: DISCONTINUED | OUTPATIENT
Start: 2023-04-13 | End: 2023-04-15 | Stop reason: HOSPADM

## 2023-04-13 RX ORDER — OXYCODONE HYDROCHLORIDE 5 MG/1
5 TABLET ORAL
Status: DISCONTINUED | OUTPATIENT
Start: 2023-04-13 | End: 2023-04-15 | Stop reason: HOSPADM

## 2023-04-13 RX ORDER — SODIUM CHLORIDE 0.9 % (FLUSH) 0.9 %
5-40 SYRINGE (ML) INJECTION EVERY 8 HOURS
Status: DISCONTINUED | OUTPATIENT
Start: 2023-04-13 | End: 2023-04-13 | Stop reason: HOSPADM

## 2023-04-13 RX ORDER — HYDROMORPHONE HYDROCHLORIDE 1 MG/ML
0.25 INJECTION, SOLUTION INTRAMUSCULAR; INTRAVENOUS; SUBCUTANEOUS
Status: DISCONTINUED | OUTPATIENT
Start: 2023-04-13 | End: 2023-04-15 | Stop reason: HOSPADM

## 2023-04-13 RX ORDER — ENOXAPARIN SODIUM 100 MG/ML
40 INJECTION SUBCUTANEOUS DAILY
Status: DISCONTINUED | OUTPATIENT
Start: 2023-04-14 | End: 2023-04-15 | Stop reason: HOSPADM

## 2023-04-13 RX ORDER — SODIUM CHLORIDE, SODIUM LACTATE, POTASSIUM CHLORIDE, CALCIUM CHLORIDE 600; 310; 30; 20 MG/100ML; MG/100ML; MG/100ML; MG/100ML
75 INJECTION, SOLUTION INTRAVENOUS CONTINUOUS
Status: DISCONTINUED | OUTPATIENT
Start: 2023-04-13 | End: 2023-04-13 | Stop reason: HOSPADM

## 2023-04-13 RX ORDER — FENTANYL CITRATE 50 UG/ML
25 INJECTION, SOLUTION INTRAMUSCULAR; INTRAVENOUS
Status: DISCONTINUED | OUTPATIENT
Start: 2023-04-13 | End: 2023-04-13 | Stop reason: HOSPADM

## 2023-04-13 RX ORDER — ACETAMINOPHEN 500 MG
1000 TABLET ORAL ONCE
Status: COMPLETED | OUTPATIENT
Start: 2023-04-13 | End: 2023-04-13

## 2023-04-13 RX ORDER — HYDROMORPHONE HYDROCHLORIDE 1 MG/ML
0.5 INJECTION, SOLUTION INTRAMUSCULAR; INTRAVENOUS; SUBCUTANEOUS
Status: DISCONTINUED | OUTPATIENT
Start: 2023-04-13 | End: 2023-04-13 | Stop reason: HOSPADM

## 2023-04-13 RX ORDER — ACETAMINOPHEN 325 MG/1
650 TABLET ORAL ONCE
Status: DISCONTINUED | OUTPATIENT
Start: 2023-04-13 | End: 2023-04-13 | Stop reason: HOSPADM

## 2023-04-13 RX ORDER — ROPIVACAINE HYDROCHLORIDE 5 MG/ML
30 INJECTION, SOLUTION EPIDURAL; INFILTRATION; PERINEURAL AS NEEDED
Status: DISCONTINUED | OUTPATIENT
Start: 2023-04-13 | End: 2023-04-13 | Stop reason: HOSPADM

## 2023-04-13 RX ORDER — SODIUM CHLORIDE, SODIUM LACTATE, POTASSIUM CHLORIDE, CALCIUM CHLORIDE 600; 310; 30; 20 MG/100ML; MG/100ML; MG/100ML; MG/100ML
25 INJECTION, SOLUTION INTRAVENOUS CONTINUOUS
Status: DISCONTINUED | OUTPATIENT
Start: 2023-04-13 | End: 2023-04-13 | Stop reason: HOSPADM

## 2023-04-13 RX ORDER — SODIUM CHLORIDE, SODIUM LACTATE, POTASSIUM CHLORIDE, CALCIUM CHLORIDE 600; 310; 30; 20 MG/100ML; MG/100ML; MG/100ML; MG/100ML
75 INJECTION, SOLUTION INTRAVENOUS CONTINUOUS
Status: DISCONTINUED | OUTPATIENT
Start: 2023-04-13 | End: 2023-04-15 | Stop reason: HOSPADM

## 2023-04-13 RX ORDER — SCOLOPAMINE TRANSDERMAL SYSTEM 1 MG/1
1 PATCH, EXTENDED RELEASE TRANSDERMAL
Status: DISCONTINUED | OUTPATIENT
Start: 2023-04-13 | End: 2023-04-13 | Stop reason: HOSPADM

## 2023-04-13 RX ADMIN — HYDROMORPHONE HYDROCHLORIDE 0.25 MG: 1 INJECTION, SOLUTION INTRAMUSCULAR; INTRAVENOUS; SUBCUTANEOUS at 16:37

## 2023-04-13 RX ADMIN — SODIUM CHLORIDE, PRESERVATIVE FREE 10 ML: 5 INJECTION INTRAVENOUS at 20:22

## 2023-04-13 RX ADMIN — HYDROMORPHONE HYDROCHLORIDE 0.25 MG: 1 INJECTION, SOLUTION INTRAMUSCULAR; INTRAVENOUS; SUBCUTANEOUS at 13:00

## 2023-04-13 RX ADMIN — ACETAMINOPHEN 650 MG: 325 TABLET ORAL at 20:21

## 2023-04-13 RX ADMIN — SODIUM CHLORIDE, PRESERVATIVE FREE 10 ML: 5 INJECTION INTRAVENOUS at 14:00

## 2023-04-13 RX ADMIN — SODIUM CHLORIDE, POTASSIUM CHLORIDE, SODIUM LACTATE AND CALCIUM CHLORIDE 25 ML/HR: 600; 310; 30; 20 INJECTION, SOLUTION INTRAVENOUS at 07:15

## 2023-04-13 RX ADMIN — SODIUM CHLORIDE, POTASSIUM CHLORIDE, SODIUM LACTATE AND CALCIUM CHLORIDE 75 ML/HR: 600; 310; 30; 20 INJECTION, SOLUTION INTRAVENOUS at 13:00

## 2023-04-13 RX ADMIN — ACETAMINOPHEN 1000 MG: 500 TABLET ORAL at 06:18

## 2023-04-13 RX ADMIN — ATENOLOL 25 MG: 25 TABLET ORAL at 20:21

## 2023-04-13 RX ADMIN — Medication 3 AMPULE: at 06:18

## 2023-04-13 RX ADMIN — AMLODIPINE BESYLATE 5 MG: 5 TABLET ORAL at 20:21

## 2023-04-14 LAB
ANION GAP SERPL CALC-SCNC: 5 MMOL/L (ref 5–15)
BASOPHILS # BLD: 0 K/UL (ref 0–0.1)
BASOPHILS NFR BLD: 0 % (ref 0–1)
BUN SERPL-MCNC: 14 MG/DL (ref 6–20)
BUN/CREAT SERPL: 17 (ref 12–20)
CALCIUM SERPL-MCNC: 8.2 MG/DL (ref 8.5–10.1)
CHLORIDE SERPL-SCNC: 110 MMOL/L (ref 97–108)
CO2 SERPL-SCNC: 23 MMOL/L (ref 21–32)
CREAT SERPL-MCNC: 0.82 MG/DL (ref 0.55–1.02)
DIFFERENTIAL METHOD BLD: ABNORMAL
EOSINOPHIL # BLD: 0 K/UL (ref 0–0.4)
EOSINOPHIL NFR BLD: 0 % (ref 0–7)
ERYTHROCYTE [DISTWIDTH] IN BLOOD BY AUTOMATED COUNT: 17.2 % (ref 11.5–14.5)
GLUCOSE SERPL-MCNC: 131 MG/DL (ref 65–100)
HCT VFR BLD AUTO: 26.2 % (ref 35–47)
HGB BLD-MCNC: 7.9 G/DL (ref 11.5–16)
IMM GRANULOCYTES # BLD AUTO: 0.1 K/UL (ref 0–0.04)
IMM GRANULOCYTES NFR BLD AUTO: 1 % (ref 0–0.5)
LYMPHOCYTES # BLD: 1.4 K/UL (ref 0.8–3.5)
LYMPHOCYTES NFR BLD: 10 % (ref 12–49)
MCH RBC QN AUTO: 24 PG (ref 26–34)
MCHC RBC AUTO-ENTMCNC: 30.2 G/DL (ref 30–36.5)
MCV RBC AUTO: 79.6 FL (ref 80–99)
MONOCYTES # BLD: 0.8 K/UL (ref 0–1)
MONOCYTES NFR BLD: 5 % (ref 5–13)
NEUTS SEG # BLD: 12.2 K/UL (ref 1.8–8)
NEUTS SEG NFR BLD: 84 % (ref 32–75)
NRBC # BLD: 0 K/UL (ref 0–0.01)
NRBC BLD-RTO: 0 PER 100 WBC
PLATELET # BLD AUTO: 290 K/UL (ref 150–400)
PMV BLD AUTO: 11.1 FL (ref 8.9–12.9)
POTASSIUM SERPL-SCNC: 4.4 MMOL/L (ref 3.5–5.1)
RBC # BLD AUTO: 3.29 M/UL (ref 3.8–5.2)
SODIUM SERPL-SCNC: 138 MMOL/L (ref 136–145)
WBC # BLD AUTO: 14.5 K/UL (ref 3.6–11)

## 2023-04-14 PROCEDURE — 80048 BASIC METABOLIC PNL TOTAL CA: CPT

## 2023-04-14 PROCEDURE — 85025 COMPLETE CBC W/AUTO DIFF WBC: CPT

## 2023-04-14 PROCEDURE — 36415 COLL VENOUS BLD VENIPUNCTURE: CPT

## 2023-04-14 PROCEDURE — 74011250637 HC RX REV CODE- 250/637: Performed by: SURGERY

## 2023-04-14 PROCEDURE — 74011250636 HC RX REV CODE- 250/636: Performed by: SURGERY

## 2023-04-14 PROCEDURE — 65270000029 HC RM PRIVATE

## 2023-04-14 PROCEDURE — 51798 US URINE CAPACITY MEASURE: CPT

## 2023-04-14 PROCEDURE — 74011000250 HC RX REV CODE- 250: Performed by: SURGERY

## 2023-04-14 RX ORDER — OXYCODONE HYDROCHLORIDE 5 MG/1
5 TABLET ORAL
Qty: 12 TABLET | Refills: 0 | Status: SHIPPED | OUTPATIENT
Start: 2023-04-14 | End: 2023-04-17

## 2023-04-14 RX ORDER — ACETAMINOPHEN 325 MG/1
650 TABLET ORAL EVERY 6 HOURS
Qty: 56 TABLET | Refills: 0 | Status: SHIPPED | OUTPATIENT
Start: 2023-04-14 | End: 2023-04-21

## 2023-04-14 RX ORDER — ENOXAPARIN SODIUM 100 MG/ML
40 INJECTION SUBCUTANEOUS DAILY
Qty: 11.2 ML | Refills: 0 | Status: SHIPPED | OUTPATIENT
Start: 2023-04-15 | End: 2023-05-13

## 2023-04-14 RX ADMIN — ACETAMINOPHEN 650 MG: 325 TABLET ORAL at 14:32

## 2023-04-14 RX ADMIN — SODIUM CHLORIDE, POTASSIUM CHLORIDE, SODIUM LACTATE AND CALCIUM CHLORIDE 75 ML/HR: 600; 310; 30; 20 INJECTION, SOLUTION INTRAVENOUS at 19:29

## 2023-04-14 RX ADMIN — SODIUM CHLORIDE, PRESERVATIVE FREE 10 ML: 5 INJECTION INTRAVENOUS at 14:37

## 2023-04-14 RX ADMIN — ATENOLOL 25 MG: 25 TABLET ORAL at 08:43

## 2023-04-14 RX ADMIN — HYDROMORPHONE HYDROCHLORIDE 0.25 MG: 1 INJECTION, SOLUTION INTRAMUSCULAR; INTRAVENOUS; SUBCUTANEOUS at 10:51

## 2023-04-14 RX ADMIN — SODIUM CHLORIDE, POTASSIUM CHLORIDE, SODIUM LACTATE AND CALCIUM CHLORIDE 75 ML/HR: 600; 310; 30; 20 INJECTION, SOLUTION INTRAVENOUS at 00:45

## 2023-04-14 RX ADMIN — ACETAMINOPHEN 650 MG: 325 TABLET ORAL at 00:44

## 2023-04-14 RX ADMIN — ACETAMINOPHEN 650 MG: 325 TABLET ORAL at 06:19

## 2023-04-14 RX ADMIN — ATENOLOL 25 MG: 25 TABLET ORAL at 18:08

## 2023-04-14 RX ADMIN — ACETAMINOPHEN 650 MG: 325 TABLET ORAL at 20:40

## 2023-04-14 RX ADMIN — SODIUM CHLORIDE, PRESERVATIVE FREE 10 ML: 5 INJECTION INTRAVENOUS at 06:16

## 2023-04-14 RX ADMIN — SODIUM CHLORIDE, PRESERVATIVE FREE 10 ML: 5 INJECTION INTRAVENOUS at 23:33

## 2023-04-14 RX ADMIN — AMLODIPINE BESYLATE 5 MG: 5 TABLET ORAL at 18:08

## 2023-04-15 VITALS
DIASTOLIC BLOOD PRESSURE: 59 MMHG | SYSTOLIC BLOOD PRESSURE: 172 MMHG | OXYGEN SATURATION: 96 % | HEART RATE: 84 BPM | RESPIRATION RATE: 17 BRPM | HEIGHT: 62 IN | TEMPERATURE: 98.4 F | WEIGHT: 181.66 LBS | BODY MASS INDEX: 33.43 KG/M2

## 2023-04-15 LAB
ANION GAP SERPL CALC-SCNC: 3 MMOL/L (ref 5–15)
BASOPHILS # BLD: 0.1 K/UL (ref 0–0.1)
BASOPHILS NFR BLD: 1 % (ref 0–1)
BUN SERPL-MCNC: 16 MG/DL (ref 6–20)
BUN/CREAT SERPL: 17 (ref 12–20)
CALCIUM SERPL-MCNC: 8.8 MG/DL (ref 8.5–10.1)
CHLORIDE SERPL-SCNC: 111 MMOL/L (ref 97–108)
CO2 SERPL-SCNC: 26 MMOL/L (ref 21–32)
CREAT SERPL-MCNC: 0.95 MG/DL (ref 0.55–1.02)
DIFFERENTIAL METHOD BLD: ABNORMAL
EOSINOPHIL # BLD: 0.2 K/UL (ref 0–0.4)
EOSINOPHIL NFR BLD: 2 % (ref 0–7)
ERYTHROCYTE [DISTWIDTH] IN BLOOD BY AUTOMATED COUNT: 17.6 % (ref 11.5–14.5)
GLUCOSE SERPL-MCNC: 100 MG/DL (ref 65–100)
HCT VFR BLD AUTO: 30.4 % (ref 35–47)
HGB BLD-MCNC: 8.9 G/DL (ref 11.5–16)
IMM GRANULOCYTES # BLD AUTO: 0 K/UL (ref 0–0.04)
IMM GRANULOCYTES NFR BLD AUTO: 0 % (ref 0–0.5)
LYMPHOCYTES # BLD: 2.7 K/UL (ref 0.8–3.5)
LYMPHOCYTES NFR BLD: 22 % (ref 12–49)
MCH RBC QN AUTO: 24.4 PG (ref 26–34)
MCHC RBC AUTO-ENTMCNC: 29.3 G/DL (ref 30–36.5)
MCV RBC AUTO: 83.3 FL (ref 80–99)
MONOCYTES # BLD: 1 K/UL (ref 0–1)
MONOCYTES NFR BLD: 8 % (ref 5–13)
NEUTS SEG # BLD: 8.2 K/UL (ref 1.8–8)
NEUTS SEG NFR BLD: 67 % (ref 32–75)
NRBC # BLD: 0 K/UL (ref 0–0.01)
NRBC BLD-RTO: 0 PER 100 WBC
PLATELET # BLD AUTO: 327 K/UL (ref 150–400)
PMV BLD AUTO: 11.6 FL (ref 8.9–12.9)
POTASSIUM SERPL-SCNC: 4.5 MMOL/L (ref 3.5–5.1)
RBC # BLD AUTO: 3.65 M/UL (ref 3.8–5.2)
SODIUM SERPL-SCNC: 140 MMOL/L (ref 136–145)
WBC # BLD AUTO: 12.3 K/UL (ref 3.6–11)

## 2023-04-15 PROCEDURE — 36415 COLL VENOUS BLD VENIPUNCTURE: CPT

## 2023-04-15 PROCEDURE — 74011000250 HC RX REV CODE- 250: Performed by: SURGERY

## 2023-04-15 PROCEDURE — 74011250637 HC RX REV CODE- 250/637: Performed by: SURGERY

## 2023-04-15 PROCEDURE — 74011250636 HC RX REV CODE- 250/636: Performed by: SURGERY

## 2023-04-15 PROCEDURE — 85025 COMPLETE CBC W/AUTO DIFF WBC: CPT

## 2023-04-15 PROCEDURE — 80048 BASIC METABOLIC PNL TOTAL CA: CPT

## 2023-04-15 RX ADMIN — ENOXAPARIN SODIUM 40 MG: 100 INJECTION SUBCUTANEOUS at 09:11

## 2023-04-15 RX ADMIN — SODIUM CHLORIDE, PRESERVATIVE FREE 10 ML: 5 INJECTION INTRAVENOUS at 06:52

## 2023-04-15 RX ADMIN — ACETAMINOPHEN 650 MG: 325 TABLET ORAL at 06:46

## 2023-04-15 RX ADMIN — ATENOLOL 25 MG: 25 TABLET ORAL at 09:10

## 2023-04-17 ENCOUNTER — PATIENT OUTREACH (OUTPATIENT)
Dept: CASE MANAGEMENT | Age: 83
End: 2023-04-17

## 2023-04-17 NOTE — PROGRESS NOTES
Care Transitions Initial Call    Call within 2 business days of discharge: Yes     Patient Current Location: [place]    Patient: Tricia Bruno Patient : 1940 MRN: [id number]    Last Discharge [address]       Date Complaint Diagnosis Description Type Department Provider    23  Rectal cancer [facility]) Admission (Discharged) Benny Springer MD            Was this an external facility discharge? No     Challenges to be reviewed by the provider   Additional needs identified to be addressed with provider: yes      Patient declined CTN offer to move PCP appt up from 5/15/23, patient also declines [facility] post hospital visit    Patient reports she has a call in to Dr oJan Beck office as she was not able to get lovenox until this afternoon per her pharmacy--patient also reports she needs someone to show her how to inject lovenox. CTN will follow up with patient to ensure she gets medication and that surgeon office can assist with injection education. Abnormal labs at discharge:    WBC 12.3  H/H 8.9/30.4    Patient has follow up with surgeon Dr Joan Beck 23 per patient report    Patient does not have ambulatory HIPPA on file    Patient does not have ACP on file           Method of communication with provider : chart routing    Discussed COVID-19 related testing which was not done at this time. Advance Care Planning:   Does patient have an Advance Directive: decision makers updated, not on file. Inpatient Readmission Risk score: Unplanned Readmit Risk Score: 11.9    Was this a readmission?  no       Patients top risk factors for readmission:  patient declines sooner appt with PCP or DH followup, patient does not have lovenox and states she does not know how to administer    Interventions to address risk factors:  CTN provided [facility] contact number to patient, patient states she already has a call in to surgeon office regarding lovenox concerns--CTN will follow up with patient later today to ensure resolution    Care Transition Nurse (CTN) contacted the patient by telephone to perform post hospital discharge assessment. Verified name and  with patient as identifiers. Provided introduction to self, and explanation of the CTN role. CTN reviewed discharge instructions, medical action plan and red flags with patient who verbalized understanding. Were discharge instructions available to patient? yes. Reviewed appropriate site of care based on symptoms and resources available to patient including: PCP and Specialist. Patient given an opportunity to ask questions and does not have any further questions or concerns at this time. The patient agrees to contact the PCP office for questions related to their healthcare. Medication reconciliation was performed with patient, who verbalizes understanding of administration of home medications. Advised obtaining a 90-day supply of all daily and as-needed medications. Referral to Pharm D needed: no     Home Health/Outpatient orders at discharge: none    Durable Medical Equipment ordered at discharge: None    Was patient discharged with a pulse oximeter? no    Discussed follow-up appointments. If no appointment was previously scheduled, appointment scheduling offered: yes. Is follow up appointment scheduled within 7 days of discharge? no.   [facility] follow up appointment(s):   Future Appointments   Date Time Provider Zain Kern   5/15/2023 10:45 AM Cesia Puga MD [facility] BS AMB   2023 10:00 AM Cesia Puga MD [facility] BS AMB     Non-[facility] follow up appointment(s): Dr Pearl Davis 23    Plan for follow-up call in 1-2 days based on severity of symptoms and risk factors. Plan for next call:  lovenox and administration. CTN provided contact information for future needs. Goals Addressed                   This Visit's Progress     Prevent complications post hospitalization.         23  Patient declines making new appt with PCP for VALENTIN, declines [facility] appt. Patient states she has appt on 5/15/23 with PCP for BP check. Patient states she has a followup with surgeon Dr Kev Obando 4/28/23. Patient reports she has a call in to Dr Kev Obando office this morning as she was unable to get lovenox until this afternoon as her pharmacy did not have in stock until then, she also states she needs someone to show her how to perform injections. She will followup with patient tomorrow to ensure she gets medication and instructions on injections. CTN reviewed s/sx infection with patient, patient denies fever, redness, drainage from incisions. Patient states she will call surgeon if needed  Patient reports she is able to eat and drink without nausea, is on a low fiber diet. Patient reports she has had chicken noodle soup, eggs and toast since discharge. Patient reports her larger incision area is painful, is taking tylenol every 6 hours, she reports this works. CTN instructed pt use pain med if needed and call MD if pain is not controlled. Patient reports she is having frequent stools that are thicker than diarrhea, no blood observed. Using teachback patient instructed to ambulate and to ensure adequate fluids while having frequent stools. Patient provided [facility] contact number to call if needed. CTN will follow up tomorrow with patient regarding lovenox and instruction. --lynda

## 2023-04-18 ENCOUNTER — PATIENT OUTREACH (OUTPATIENT)
Dept: CASE MANAGEMENT | Age: 83
End: 2023-04-18

## 2023-04-26 ENCOUNTER — PATIENT OUTREACH (OUTPATIENT)
Dept: CASE MANAGEMENT | Age: 83
End: 2023-04-26

## 2023-04-26 NOTE — PROGRESS NOTES
Care Transitions Outreach Attempt       Attempted to reach patient for transitions of care follow up. Unable to reach patient. Patient: Amanda Moralez Patient : 1940 MRN: [id number]    Last Discharge [address]       Date Complaint Diagnosis Description Type Department Provider    23  Rectal cancer [facility]) Admission (Discharged) Nico Bob MD          Noted following upcoming appointments from discharge chart review:   [facility] follow up appointment(s): No future appointments. Non-[facility] follow up appointment(s): Dr Cipriano Lentz 23     Goals        Prevent complications post hospitalization. 23  Patient declines making new appt with PCP for SAMEERA LUNA, declines [facility] appt. Patient states she has appt on 5/15/23 with PCP for BP check. Patient states she has a followup with surgeon Dr Cipriano Lentz 23. Patient reports she has a call in to Dr Cipriano Lentz office this morning as she was unable to get lovenox until this afternoon as her pharmacy did not have in stock until then, she also states she needs someone to show her how to perform injections. She will followup with patient tomorrow to ensure she gets medication and instructions on injections. CTN reviewed s/sx infection with patient, patient denies fever, redness, drainage from incisions. Patient states she will call surgeon if needed  Patient reports she is able to eat and drink without nausea, is on a low fiber diet. Patient reports she has had chicken noodle soup, eggs and toast since discharge. Patient reports her larger incision area is painful, is taking tylenol every 6 hours, she reports this works. CTN instructed pt use pain med if needed and call MD if pain is not controlled. Patient reports she is having frequent stools that are thicker than diarrhea, no blood observed. Using teachback patient instructed to ambulate and to ensure adequate fluids while having frequent stools.   Patient provided [facility] contact number to call if needed. CTN will follow up tomorrow with patient regarding lovenox and instruction. --lynda    04/18/23  Patient spouse reports Dr Betzy Hilton informed patient that she did not have to take the lovenox as patient is ambulating throughout the day. CTN reminded spouse to continue to have patient walk, drink plenty of fluids, avoid crossing legs, perform foot pumps when sitting . Monitor for swelling and/ or pain in calves, call MD to report concerns. --lynda    04/26/23  CTN unable to reach patient on phone, LM on   Patient has appt with surgeon Dr Betzy Hilton 4/28/23.   CTN will follow up after surgeon appt for plan of care for patient---lynda

## 2023-05-02 RX ORDER — ENOXAPARIN SODIUM 100 MG/ML
40 INJECTION SUBCUTANEOUS DAILY
Qty: 11.2 ML | Refills: 0 | COMMUNITY
Start: 2023-04-15 | End: 2023-05-13

## 2023-05-15 ENCOUNTER — OFFICE VISIT (OUTPATIENT)
Facility: CLINIC | Age: 83
End: 2023-05-15
Payer: MEDICARE

## 2023-05-15 VITALS
WEIGHT: 179 LBS | SYSTOLIC BLOOD PRESSURE: 128 MMHG | DIASTOLIC BLOOD PRESSURE: 72 MMHG | HEIGHT: 62 IN | RESPIRATION RATE: 18 BRPM | HEART RATE: 67 BPM | BODY MASS INDEX: 32.94 KG/M2 | TEMPERATURE: 98.4 F | OXYGEN SATURATION: 99 %

## 2023-05-15 DIAGNOSIS — H35.3231 EXUDATIVE AGE-RELATED MACULAR DEGENERATION, BILATERAL, WITH ACTIVE CHOROIDAL NEOVASCULARIZATION (HCC): ICD-10-CM

## 2023-05-15 DIAGNOSIS — I10 HTN (HYPERTENSION), BENIGN: Primary | ICD-10-CM

## 2023-05-15 DIAGNOSIS — M85.89 OSTEOPENIA OF MULTIPLE SITES: ICD-10-CM

## 2023-05-15 DIAGNOSIS — C20 RECTAL CANCER (HCC): ICD-10-CM

## 2023-05-15 PROCEDURE — G8427 DOCREV CUR MEDS BY ELIG CLIN: HCPCS | Performed by: INTERNAL MEDICINE

## 2023-05-15 PROCEDURE — 3078F DIAST BP <80 MM HG: CPT | Performed by: INTERNAL MEDICINE

## 2023-05-15 PROCEDURE — G8399 PT W/DXA RESULTS DOCUMENT: HCPCS | Performed by: INTERNAL MEDICINE

## 2023-05-15 PROCEDURE — 99213 OFFICE O/P EST LOW 20 MIN: CPT | Performed by: INTERNAL MEDICINE

## 2023-05-15 PROCEDURE — 1123F ACP DISCUSS/DSCN MKR DOCD: CPT | Performed by: INTERNAL MEDICINE

## 2023-05-15 PROCEDURE — G8417 CALC BMI ABV UP PARAM F/U: HCPCS | Performed by: INTERNAL MEDICINE

## 2023-05-15 PROCEDURE — 1036F TOBACCO NON-USER: CPT | Performed by: INTERNAL MEDICINE

## 2023-05-15 PROCEDURE — 1090F PRES/ABSN URINE INCON ASSESS: CPT | Performed by: INTERNAL MEDICINE

## 2023-05-15 PROCEDURE — 3074F SYST BP LT 130 MM HG: CPT | Performed by: INTERNAL MEDICINE

## 2023-05-15 NOTE — PROGRESS NOTES
Drew Fletcher is a 81 y.o. female presenting for Follow-up Chronic Condition (6 mo fu)  . 1. Have you been to the ER, urgent care clinic since your last visit? Hospitalized since your last visit? [id number] [facility] 4/13/23- 4/15/23    2. Have you seen or consulted any other health care providers outside of the [address] [place] [address] since your last visit? Include any pap smears or colon screening.   Dr Mauro Rogers

## 2023-05-15 NOTE — PROGRESS NOTES
Michelle Morin is a 81 y.o. female and presents with Follow-up Chronic Condition (6 mo fu)  . Subjective:  Mrs. Follow-up presents today for 6-month follow-up for hypertension. Her history is significant for right breast cancer, rectal cancer status post resection recently, macular degeneration. She remains on Herceptin every 3 weeks and has an echocardiogram done every 3 months. She still feels a little fatigued at this time. She was mildly anemic after her surgery and was felt to have had some blood loss from her rectal cancer and surgery. Her CBC is being monitored by her oncologist.  She is taking a fiber daily and is moving her bowels better. She remains on atenolol 25 mg twice a day and amlodipine 5 mg daily for hypertension with good results. She has no shortness of breath, chest pain, palpitations, PND, orthopnea, or pedal edema.     Past Medical History:   Diagnosis Date    Arthritis     knees, hands    Breast cancer, right ([address] Dx 4/12/16    Dr Julien Angulo    Exudative age-related macular degeneration of both eyes with active choroidal neovascularization ([address] 01/29/2021    HTN (hypertension), benign 09/22/2017    Hypertension     Macular degeneration     unable to drive, limited reading    Motion sickness     Nausea & vomiting     Osteopenia of multiple sites 01/29/2021    Postherpetic neuralgia     right temple and scalp    PUD (peptic ulcer disease)     Pure hypercholesterolemia 01/29/2021     Past Surgical History:   Procedure Laterality Date    CATARACT REMOVAL Bilateral 1997 2005    COLONOSCOPY N/A 03/03/2023    COLONOSCOPY WITH BIOPSY AND POLYPECTOMY performed by Basil Montes MD at [facility] ENDOSCOPY    ORTHOPEDIC SURGERY Left 01/01/2008    part knee replacement    UPPER GI ENDOSCOPY,BIOPSY  06/20/2016         UPPER GI ENDOSCOPY,CTRL BLEED  06/20/2016         VASCULAR SURGERY Left 2016    [place]     Allergies   Allergen Reactions    Menthol Swelling     Mouth swelling, had

## 2023-06-26 ENCOUNTER — TRANSCRIBE ORDERS (OUTPATIENT)
Facility: HOSPITAL | Age: 83
End: 2023-06-26

## 2023-06-26 DIAGNOSIS — Z79.899 OTHER LONG TERM (CURRENT) DRUG THERAPY: ICD-10-CM

## 2023-06-26 DIAGNOSIS — Z08 ENCOUNTER FOR FOLLOW-UP EXAMINATION AFTER TREATMENT FOR MALIGNANT NEOPLASM: ICD-10-CM

## 2023-06-26 DIAGNOSIS — E61.1 IRON DEFICIENCY: ICD-10-CM

## 2023-06-26 DIAGNOSIS — C50.111 MALIGNANT NEOPLASM OF CENTRAL PORTION OF RIGHT FEMALE BREAST, UNSPECIFIED ESTROGEN RECEPTOR STATUS (HCC): Primary | ICD-10-CM

## 2023-06-26 DIAGNOSIS — R11.2 NAUSEA AND VOMITING, UNSPECIFIED VOMITING TYPE: ICD-10-CM

## 2023-06-26 DIAGNOSIS — C20 MALIGNANT NEOPLASM OF RECTUM (HCC): ICD-10-CM

## 2023-06-26 DIAGNOSIS — D64.81 ANEMIA DUE TO ANTINEOPLASTIC CHEMOTHERAPY: ICD-10-CM

## 2023-06-26 DIAGNOSIS — R19.7 DIARRHEA, UNSPECIFIED TYPE: ICD-10-CM

## 2023-06-26 DIAGNOSIS — T45.1X5A ANEMIA DUE TO ANTINEOPLASTIC CHEMOTHERAPY: ICD-10-CM

## 2023-06-27 ENCOUNTER — HOSPITAL ENCOUNTER (OUTPATIENT)
Facility: HOSPITAL | Age: 83
Discharge: HOME | End: 2023-06-29
Attending: INTERNAL MEDICINE
Payer: MEDICARE

## 2023-06-27 VITALS
BODY MASS INDEX: 32.94 KG/M2 | HEIGHT: 62 IN | DIASTOLIC BLOOD PRESSURE: 72 MMHG | WEIGHT: 179.01 LBS | SYSTOLIC BLOOD PRESSURE: 128 MMHG

## 2023-06-27 DIAGNOSIS — C20 MALIGNANT NEOPLASM OF RECTUM (HCC): ICD-10-CM

## 2023-06-27 DIAGNOSIS — Z79.899 OTHER LONG TERM (CURRENT) DRUG THERAPY: ICD-10-CM

## 2023-06-27 DIAGNOSIS — D64.81 ANEMIA DUE TO ANTINEOPLASTIC CHEMOTHERAPY: ICD-10-CM

## 2023-06-27 DIAGNOSIS — C50.111 MALIGNANT NEOPLASM OF CENTRAL PORTION OF RIGHT FEMALE BREAST, UNSPECIFIED ESTROGEN RECEPTOR STATUS (HCC): ICD-10-CM

## 2023-06-27 DIAGNOSIS — R19.7 DIARRHEA, UNSPECIFIED TYPE: ICD-10-CM

## 2023-06-27 DIAGNOSIS — E61.1 IRON DEFICIENCY: ICD-10-CM

## 2023-06-27 DIAGNOSIS — Z08 ENCOUNTER FOR FOLLOW-UP EXAMINATION AFTER TREATMENT FOR MALIGNANT NEOPLASM: ICD-10-CM

## 2023-06-27 DIAGNOSIS — T45.1X5A ANEMIA DUE TO ANTINEOPLASTIC CHEMOTHERAPY: ICD-10-CM

## 2023-06-27 DIAGNOSIS — R11.2 NAUSEA AND VOMITING, UNSPECIFIED VOMITING TYPE: ICD-10-CM

## 2023-06-27 PROCEDURE — 93306 TTE W/DOPPLER COMPLETE: CPT

## 2023-06-27 PROCEDURE — 93308 TTE F-UP OR LMTD: CPT

## 2023-06-28 LAB
ECHO BSA: 1.88 M2
ECHO LV EDV A2C: 55 ML
ECHO LV EDV A4C: 81 ML
ECHO LV EDV BP: 68 ML (ref 56–104)
ECHO LV EDV INDEX A4C: 45 ML/M2
ECHO LV EDV INDEX BP: 37 ML/M2
ECHO LV EDV NDEX A2C: 30 ML/M2
ECHO LV EJECTION FRACTION A2C: 48 %
ECHO LV EJECTION FRACTION A4C: 57 %
ECHO LV EJECTION FRACTION BIPLANE: 50 % (ref 55–100)
ECHO LV ESV A2C: 29 ML
ECHO LV ESV A4C: 35 ML
ECHO LV ESV BP: 34 ML (ref 19–49)
ECHO LV ESV INDEX A2C: 16 ML/M2
ECHO LV ESV INDEX A4C: 19 ML/M2
ECHO LV ESV INDEX BP: 19 ML/M2
ECHO LV FRACTIONAL SHORTENING: 29 % (ref 28–44)
ECHO LV GLOBAL LONGITUDINAL STRAIN (GLS): -14.9 %
ECHO LV GLOBAL LONGITUDINAL STRAIN (GLS): -15 %
ECHO LV GLOBAL LONGITUDINAL STRAIN (GLS): -15.2 %
ECHO LV GLOBAL LONGITUDINAL STRAIN (GLS): -15.8 %
ECHO LV INTERNAL DIMENSION DIASTOLE INDEX: 3.19 CM/M2
ECHO LV INTERNAL DIMENSION DIASTOLIC: 5.8 CM (ref 3.9–5.3)
ECHO LV INTERNAL DIMENSION SYSTOLIC INDEX: 2.25 CM/M2
ECHO LV INTERNAL DIMENSION SYSTOLIC: 4.1 CM
ECHO LV IVSD: 0.9 CM (ref 0.6–0.9)
ECHO LV MASS 2D: 203.5 G (ref 67–162)
ECHO LV MASS INDEX 2D: 111.8 G/M2 (ref 43–95)
ECHO LV POSTERIOR WALL DIASTOLIC: 0.9 CM (ref 0.6–0.9)
ECHO LV RELATIVE WALL THICKNESS RATIO: 0.31

## 2023-10-06 ENCOUNTER — HOSPITAL ENCOUNTER (OUTPATIENT)
Facility: HOSPITAL | Age: 83
End: 2023-10-06
Attending: INTERNAL MEDICINE
Payer: MEDICARE

## 2023-10-06 VITALS — BODY MASS INDEX: 32.94 KG/M2 | WEIGHT: 179.01 LBS | HEIGHT: 62 IN

## 2023-10-06 DIAGNOSIS — E61.1 IRON DEFICIENCY: ICD-10-CM

## 2023-10-06 DIAGNOSIS — Z79.899 NEED FOR PROPHYLACTIC CHEMOTHERAPY: ICD-10-CM

## 2023-10-06 DIAGNOSIS — Z08 ENCOUNTER FOR FOLLOW-UP EXAMINATION AFTER COMPLETED TREATMENT FOR MALIGNANT NEOPLASM: ICD-10-CM

## 2023-10-06 DIAGNOSIS — C20 RECTAL CANCER (HCC): ICD-10-CM

## 2023-10-06 DIAGNOSIS — C50.111 CANCER OF CENTRAL PORTION OF RIGHT BREAST (HCC): ICD-10-CM

## 2023-10-06 DIAGNOSIS — R11.2 SEVERE NAUSEA AND VOMITING THAT HAS LASTED A LONG TIME: ICD-10-CM

## 2023-10-06 DIAGNOSIS — R19.7 DIARRHEA OF PRESUMED INFECTIOUS ORIGIN: ICD-10-CM

## 2023-10-06 DIAGNOSIS — Z01.810 PRE-OPERATIVE CARDIOVASCULAR EXAMINATION: ICD-10-CM

## 2023-10-06 LAB
ECHO AO ROOT DIAM: 3 CM
ECHO AO ROOT INDEX: 1.65 CM/M2
ECHO AV AREA PEAK VELOCITY: 2.2 CM2
ECHO AV AREA/BSA PEAK VELOCITY: 1.2 CM2/M2
ECHO AV PEAK GRADIENT: 13 MMHG
ECHO AV PEAK VELOCITY: 1.8 M/S
ECHO AV VELOCITY RATIO: 0.72
ECHO BSA: 1.88 M2
ECHO EST RA PRESSURE: 3 MMHG
ECHO LA DIAMETER INDEX: 2.2 CM/M2
ECHO LA DIAMETER: 4 CM
ECHO LA TO AORTIC ROOT RATIO: 1.33
ECHO LA VOL 2C: 87 ML (ref 22–52)
ECHO LA VOL 2C: 91 ML (ref 22–52)
ECHO LA VOL 4C: 77 ML (ref 22–52)
ECHO LA VOL 4C: 82 ML (ref 22–52)
ECHO LA VOLUME AREA LENGTH: 91 ML
ECHO LA VOLUME INDEX AREA LENGTH: 50 ML/M2 (ref 16–34)
ECHO LV E' LATERAL VELOCITY: 7 CM/S
ECHO LV E' SEPTAL VELOCITY: 6 CM/S
ECHO LV FRACTIONAL SHORTENING: 22 % (ref 28–44)
ECHO LV INTERNAL DIMENSION DIASTOLE INDEX: 2.69 CM/M2
ECHO LV INTERNAL DIMENSION DIASTOLIC: 4.9 CM (ref 3.9–5.3)
ECHO LV INTERNAL DIMENSION SYSTOLIC INDEX: 2.09 CM/M2
ECHO LV INTERNAL DIMENSION SYSTOLIC: 3.8 CM
ECHO LV IVSD: 1.2 CM (ref 0.6–0.9)
ECHO LV MASS 2D: 226.4 G (ref 67–162)
ECHO LV MASS INDEX 2D: 124.4 G/M2 (ref 43–95)
ECHO LV POSTERIOR WALL DIASTOLIC: 1.2 CM (ref 0.6–0.9)
ECHO LV RELATIVE WALL THICKNESS RATIO: 0.49
ECHO LVOT AREA: 3.1 CM2
ECHO LVOT DIAM: 2 CM
ECHO LVOT PEAK GRADIENT: 7 MMHG
ECHO LVOT PEAK VELOCITY: 1.3 M/S
ECHO MV A VELOCITY: 0.96 M/S
ECHO MV E DECELERATION TIME (DT): 121.8 MS
ECHO MV E VELOCITY: 1.3 M/S
ECHO MV E/A RATIO: 1.35
ECHO MV E/E' LATERAL: 18.57
ECHO MV E/E' RATIO (AVERAGED): 20.12
ECHO MV E/E' SEPTAL: 21.67
ECHO MV REGURGITANT PEAK GRADIENT: 159 MMHG
ECHO MV REGURGITANT PEAK VELOCITY: 6.3 M/S
ECHO MV REGURGITANT VTIA: 257.3 CM
ECHO PULMONARY ARTERY END DIASTOLIC PRESSURE: 3 MMHG
ECHO PV REGURGITANT MAX VELOCITY: 0.9 M/S
ECHO RA VOLUME: 55 ML
ECHO RA VOLUME: 58 ML
ECHO RIGHT VENTRICULAR SYSTOLIC PRESSURE (RVSP): 42 MMHG
ECHO RV TAPSE: 2 CM (ref 1.7–?)
ECHO TV REGURGITANT MAX VELOCITY: 3.13 M/S
ECHO TV REGURGITANT PEAK GRADIENT: 40 MMHG

## 2023-10-06 PROCEDURE — 93306 TTE W/DOPPLER COMPLETE: CPT

## 2023-10-09 RX ORDER — ATENOLOL 25 MG/1
TABLET ORAL
Qty: 180 TABLET | Refills: 10 | Status: SHIPPED | OUTPATIENT
Start: 2023-10-09

## 2023-10-09 NOTE — TELEPHONE ENCOUNTER
Requested Prescriptions     Pending Prescriptions Disp Refills    atenolol (TENORMIN) 25 MG tablet [Pharmacy Med Name: ATENOLOL 25 MG Tablet] 180 tablet 10     Sig: TAKE 1 TABLET TWICE DAILY     Last fill 9/19/2022  Last OV 5/15/2023  Next OV 11/20/2023    Arthur Mukherjee LPN

## 2023-10-26 ENCOUNTER — NURSE ONLY (OUTPATIENT)
Facility: CLINIC | Age: 83
End: 2023-10-26
Payer: MEDICARE

## 2023-10-26 VITALS
OXYGEN SATURATION: 100 % | BODY MASS INDEX: 34.45 KG/M2 | WEIGHT: 187.2 LBS | SYSTOLIC BLOOD PRESSURE: 130 MMHG | HEART RATE: 78 BPM | TEMPERATURE: 98.2 F | RESPIRATION RATE: 16 BRPM | DIASTOLIC BLOOD PRESSURE: 78 MMHG | HEIGHT: 62 IN

## 2023-10-26 DIAGNOSIS — Z23 NEEDS FLU SHOT: Primary | ICD-10-CM

## 2023-10-26 PROCEDURE — G0008 ADMIN INFLUENZA VIRUS VAC: HCPCS | Performed by: INTERNAL MEDICINE

## 2023-10-26 PROCEDURE — 90694 VACC AIIV4 NO PRSRV 0.5ML IM: CPT | Performed by: INTERNAL MEDICINE

## 2023-10-26 NOTE — PROGRESS NOTES
After obtaining written consent and per orders of Dr. Anne, injection of Fluad given by Radha Cormier MA. Patient tolerated procedure well. VIS was given to them. No reactions noted.

## 2023-11-20 ENCOUNTER — OFFICE VISIT (OUTPATIENT)
Facility: CLINIC | Age: 83
End: 2023-11-20
Payer: MEDICARE

## 2023-11-20 VITALS
OXYGEN SATURATION: 99 % | DIASTOLIC BLOOD PRESSURE: 72 MMHG | SYSTOLIC BLOOD PRESSURE: 126 MMHG | HEART RATE: 67 BPM | HEIGHT: 62 IN | WEIGHT: 189.4 LBS | RESPIRATION RATE: 20 BRPM | BODY MASS INDEX: 34.85 KG/M2 | TEMPERATURE: 99.1 F

## 2023-11-20 DIAGNOSIS — R73.02 IGT (IMPAIRED GLUCOSE TOLERANCE): ICD-10-CM

## 2023-11-20 DIAGNOSIS — Z00.00 MEDICARE ANNUAL WELLNESS VISIT, SUBSEQUENT: Primary | ICD-10-CM

## 2023-11-20 DIAGNOSIS — E78.5 HYPERLIPIDEMIA, ACQUIRED: ICD-10-CM

## 2023-11-20 DIAGNOSIS — I10 HTN (HYPERTENSION), BENIGN: ICD-10-CM

## 2023-11-20 DIAGNOSIS — C78.01 MALIGNANT NEOPLASM METASTATIC TO RIGHT LUNG (HCC): ICD-10-CM

## 2023-11-20 PROCEDURE — G8484 FLU IMMUNIZE NO ADMIN: HCPCS | Performed by: INTERNAL MEDICINE

## 2023-11-20 PROCEDURE — 3078F DIAST BP <80 MM HG: CPT | Performed by: INTERNAL MEDICINE

## 2023-11-20 PROCEDURE — 1123F ACP DISCUSS/DSCN MKR DOCD: CPT | Performed by: INTERNAL MEDICINE

## 2023-11-20 PROCEDURE — 3074F SYST BP LT 130 MM HG: CPT | Performed by: INTERNAL MEDICINE

## 2023-11-20 PROCEDURE — G0439 PPPS, SUBSEQ VISIT: HCPCS | Performed by: INTERNAL MEDICINE

## 2023-11-20 ASSESSMENT — PATIENT HEALTH QUESTIONNAIRE - PHQ9
2. FEELING DOWN, DEPRESSED OR HOPELESS: 0
SUM OF ALL RESPONSES TO PHQ9 QUESTIONS 1 & 2: 0
SUM OF ALL RESPONSES TO PHQ QUESTIONS 1-9: 0
1. LITTLE INTEREST OR PLEASURE IN DOING THINGS: 0

## 2023-11-20 ASSESSMENT — LIFESTYLE VARIABLES
HOW OFTEN DO YOU HAVE A DRINK CONTAINING ALCOHOL: MONTHLY OR LESS
HOW MANY STANDARD DRINKS CONTAINING ALCOHOL DO YOU HAVE ON A TYPICAL DAY: 1 OR 2

## 2023-11-20 NOTE — PROGRESS NOTES
April Garg is a 81 y.o. female presenting for Annual Exam  .     1. Have you been to the ER, urgent care clinic since your last visit? Hospitalized since your last visit? No    2. Have you seen or consulted any other health care providers outside of the [address] [place] [address] since your last visit? Include any pap smears or colon screening.   Eye Alfredito Minor, Dr Royce Martinez
without lesions  Neck - supple, no significant adenopathy   Chest - clear to auscultation, no wheezes, rales or rhonchi, symmetric air entry   Heart - normal rate, regular rhythm, normal S1, S2, no murmurs, rubs, clicks or gallops   Abdomen - soft, nontender, nondistended, no masses or organomegaly  Lymph- no adenopathy palpable  Ext-peripheral pulses normal, no pedal edema, no clubbing or cyanosis  Skin-Warm and dry. no hyperpigmentation, vitiligo, or suspicious lesions  Neuro -alert, oriented, normal speech, no focal findings or movement disorder noted      Assessment/Plan:  Kay Voss was seen today for annual exam.    Diagnoses and all orders for this visit:    HTN (hypertension), benign    Malignant neoplasm metastatic to right lung ([address])    Hyperlipidemia, acquired    IGT (impaired glucose tolerance)    Medicare annual wellness visit, subsequent          ICD-10-CM    1. HTN (hypertension), benign  I10       2. Malignant neoplasm metastatic to right lung (HCC)  C78.01       3. Hyperlipidemia, acquired  E78.5       4. IGT (impaired glucose tolerance)  R73.02       5. Medicare annual wellness visit, subsequent  Z00.00         Plan:    Continue current medical regimen as outlined above. I have reviewed her labs done per her oncologist which are stable. Plan follow-up in 1 year unless otherwise indicated. Follow-up and Dispositions    Return in 1 year (on 11/20/2024). I have reviewed with the patient details of the assessment and plan and all questions were answered. Relevent patient education was performed. Verbal and/or written instructions (see AVS) provided. The most recent lab findings were reviewed with the patient. Plan was discussed with patient who verbally expressed understanding. An After Visit Summary was printed and given to the patient.     Jordan Vogt MD

## 2023-11-20 NOTE — PATIENT INSTRUCTIONS

## 2024-01-02 RX ORDER — OMEPRAZOLE 40 MG/1
40 CAPSULE, DELAYED RELEASE ORAL 2 TIMES DAILY
Qty: 60 CAPSULE | Refills: 3 | Status: SHIPPED | OUTPATIENT
Start: 2024-01-02

## 2024-01-02 NOTE — TELEPHONE ENCOUNTER
Pharmacy: [facility] [address]      omeprazole (PRILOSEC) 40 MG delayed release capsule [[id number]]      Order Details  Dose, Route, Frequency: As Directed   Dispense Quantity: -- Refills: --          Sig: Take 1 capsule by mouth twice daily

## 2024-01-18 ENCOUNTER — HOSPITAL ENCOUNTER (OUTPATIENT)
Facility: HOSPITAL | Age: 84
Discharge: HOME | End: 2024-01-20
Attending: INTERNAL MEDICINE
Payer: MEDICARE

## 2024-01-18 VITALS
DIASTOLIC BLOOD PRESSURE: 72 MMHG | WEIGHT: 189.38 LBS | HEIGHT: 62 IN | BODY MASS INDEX: 34.85 KG/M2 | SYSTOLIC BLOOD PRESSURE: 126 MMHG

## 2024-01-18 DIAGNOSIS — C50.111 MALIGNANT NEOPLASM OF CENTRAL PORTION OF RIGHT FEMALE BREAST, UNSPECIFIED ESTROGEN RECEPTOR STATUS (HCC): ICD-10-CM

## 2024-01-18 DIAGNOSIS — R11.2 NAUSEA AND VOMITING, UNSPECIFIED VOMITING TYPE: ICD-10-CM

## 2024-01-18 DIAGNOSIS — Z79.899 OTHER LONG TERM (CURRENT) DRUG THERAPY: ICD-10-CM

## 2024-01-18 DIAGNOSIS — D64.81 ANEMIA DUE TO ANTINEOPLASTIC CHEMOTHERAPY: ICD-10-CM

## 2024-01-18 DIAGNOSIS — R19.7 DIARRHEA, UNSPECIFIED TYPE: ICD-10-CM

## 2024-01-18 DIAGNOSIS — Z01.810 ENCOUNTER FOR PRE-OPERATIVE CARDIOVASCULAR CLEARANCE: ICD-10-CM

## 2024-01-18 DIAGNOSIS — C20 MALIGNANT NEOPLASM OF RECTUM (HCC): ICD-10-CM

## 2024-01-18 DIAGNOSIS — T45.1X5A ANEMIA DUE TO ANTINEOPLASTIC CHEMOTHERAPY: ICD-10-CM

## 2024-01-18 DIAGNOSIS — Z08 ENCOUNTER FOR FOLLOW-UP EXAMINATION AFTER COMPLETED TREATMENT FOR MALIGNANT NEOPLASM: ICD-10-CM

## 2024-01-18 DIAGNOSIS — E61.1 IRON DEFICIENCY: ICD-10-CM

## 2024-01-18 LAB
ECHO AO ASC DIAM: 2.6 CM
ECHO AO ASCENDING AORTA INDEX: 1.39 CM/M2
ECHO AV AREA PEAK VELOCITY: 2.1 CM2
ECHO AV AREA VTI: 2.1 CM2
ECHO AV AREA/BSA PEAK VELOCITY: 1.1 CM2/M2
ECHO AV AREA/BSA VTI: 1.1 CM2/M2
ECHO AV MEAN GRADIENT: 7 MMHG
ECHO AV MEAN VELOCITY: 1.3 M/S
ECHO AV PEAK GRADIENT: 11 MMHG
ECHO AV PEAK VELOCITY: 1.7 M/S
ECHO AV VELOCITY RATIO: 0.76
ECHO AV VTI: 45 CM
ECHO BSA: 1.94 M2
ECHO EST RA PRESSURE: 3 MMHG
ECHO LA VOL A-L A2C: 77 ML (ref 22–52)
ECHO LA VOL A-L A4C: 72 ML (ref 22–52)
ECHO LA VOL MOD A2C: 73 ML (ref 22–52)
ECHO LA VOL MOD A4C: 66 ML (ref 22–52)
ECHO LA VOLUME AREA LENGTH: 76 ML
ECHO LA VOLUME INDEX A-L A2C: 41 ML/M2 (ref 16–34)
ECHO LA VOLUME INDEX A-L A4C: 39 ML/M2 (ref 16–34)
ECHO LA VOLUME INDEX AREA LENGTH: 41 ML/M2 (ref 16–34)
ECHO LA VOLUME INDEX MOD A2C: 39 ML/M2 (ref 16–34)
ECHO LA VOLUME INDEX MOD A4C: 35 ML/M2 (ref 16–34)
ECHO LV E' LATERAL VELOCITY: 7 CM/S
ECHO LV E' SEPTAL VELOCITY: 5 CM/S
ECHO LV EDV A4C: 95 ML
ECHO LV EDV INDEX A4C: 51 ML/M2
ECHO LV EJECTION FRACTION A4C: 66 %
ECHO LV ESV A4C: 33 ML
ECHO LV ESV INDEX A4C: 18 ML/M2
ECHO LV FRACTIONAL SHORTENING: 31 % (ref 28–44)
ECHO LV INTERNAL DIMENSION DIASTOLE INDEX: 2.57 CM/M2
ECHO LV INTERNAL DIMENSION DIASTOLIC: 4.8 CM (ref 3.9–5.3)
ECHO LV INTERNAL DIMENSION SYSTOLIC INDEX: 1.76 CM/M2
ECHO LV INTERNAL DIMENSION SYSTOLIC: 3.3 CM
ECHO LV IVSD: 0.9 CM (ref 0.6–0.9)
ECHO LV MASS 2D: 137.1 G (ref 67–162)
ECHO LV MASS INDEX 2D: 73.3 G/M2 (ref 43–95)
ECHO LV POSTERIOR WALL DIASTOLIC: 0.8 CM (ref 0.6–0.9)
ECHO LV RELATIVE WALL THICKNESS RATIO: 0.33
ECHO LVOT AREA: 2.8 CM2
ECHO LVOT AV VTI INDEX: 0.77
ECHO LVOT DIAM: 1.9 CM
ECHO LVOT MEAN GRADIENT: 4 MMHG
ECHO LVOT PEAK GRADIENT: 7 MMHG
ECHO LVOT PEAK VELOCITY: 1.3 M/S
ECHO LVOT STROKE VOLUME INDEX: 52.7 ML/M2
ECHO LVOT SV: 98.6 ML
ECHO LVOT VTI: 34.8 CM
ECHO MV A VELOCITY: 1.27 M/S
ECHO MV AREA VTI: 2.9 CM2
ECHO MV E DECELERATION TIME (DT): 179.7 MS
ECHO MV E VELOCITY: 1.33 M/S
ECHO MV E/A RATIO: 1.05
ECHO MV E/E' LATERAL: 19
ECHO MV E/E' RATIO (AVERAGED): 22.8
ECHO MV LVOT VTI INDEX: 0.97
ECHO MV MAX VELOCITY: 1.4 M/S
ECHO MV MEAN GRADIENT: 4 MMHG
ECHO MV MEAN VELOCITY: 0.9 M/S
ECHO MV PEAK GRADIENT: 8 MMHG
ECHO MV VTI: 33.6 CM
ECHO PULMONARY ARTERY END DIASTOLIC PRESSURE: 6 MMHG
ECHO PV REGURGITANT MAX VELOCITY: 1.2 M/S
ECHO RIGHT VENTRICULAR SYSTOLIC PRESSURE (RVSP): 44 MMHG
ECHO RV FREE WALL PEAK S': 9 CM/S
ECHO RV INTERNAL DIMENSION: 4.1 CM
ECHO RV TAPSE: 2.2 CM (ref 1.7–?)
ECHO TV REGURGITANT MAX VELOCITY: 3.22 M/S
ECHO TV REGURGITANT PEAK GRADIENT: 41 MMHG

## 2024-01-18 PROCEDURE — 93306 TTE W/DOPPLER COMPLETE: CPT

## 2024-01-22 ENCOUNTER — HOSPITAL ENCOUNTER (OUTPATIENT)
Facility: HOSPITAL | Age: 84
Discharge: HOME | End: 2024-01-25
Attending: INTERNAL MEDICINE
Payer: MEDICARE

## 2024-01-22 VITALS — BODY MASS INDEX: 33.83 KG/M2 | WEIGHT: 185 LBS

## 2024-01-22 DIAGNOSIS — Z08 ENCOUNTER FOR FOLLOW-UP EXAMINATION AFTER COMPLETED TREATMENT FOR MALIGNANT NEOPLASM: ICD-10-CM

## 2024-01-22 DIAGNOSIS — T45.1X5A ANEMIA DUE TO ANTINEOPLASTIC CHEMOTHERAPY: ICD-10-CM

## 2024-01-22 DIAGNOSIS — Z79.899 OTHER LONG TERM (CURRENT) DRUG THERAPY: ICD-10-CM

## 2024-01-22 DIAGNOSIS — C20 MALIGNANT NEOPLASM OF RECTUM (HCC): ICD-10-CM

## 2024-01-22 DIAGNOSIS — C50.111 MALIGNANT NEOPLASM OF CENTRAL PORTION OF RIGHT FEMALE BREAST, UNSPECIFIED ESTROGEN RECEPTOR STATUS (HCC): ICD-10-CM

## 2024-01-22 DIAGNOSIS — R11.2 NAUSEA AND VOMITING, UNSPECIFIED VOMITING TYPE: ICD-10-CM

## 2024-01-22 DIAGNOSIS — Z01.810 ENCOUNTER FOR PREPROCEDURAL CARDIOVASCULAR EXAMINATION: ICD-10-CM

## 2024-01-22 DIAGNOSIS — D64.81 ANEMIA DUE TO ANTINEOPLASTIC CHEMOTHERAPY: ICD-10-CM

## 2024-01-22 DIAGNOSIS — E61.1 IRON DEFICIENCY: ICD-10-CM

## 2024-01-22 DIAGNOSIS — R19.7 DIARRHEA, UNSPECIFIED TYPE: ICD-10-CM

## 2024-01-22 LAB
GLUCOSE BLD STRIP.AUTO-MCNC: 125 MG/DL (ref 65–117)
SERVICE CMNT-IMP: ABNORMAL

## 2024-01-22 PROCEDURE — 82962 GLUCOSE BLOOD TEST: CPT

## 2024-01-22 PROCEDURE — A9609 HC RX DIAGNOSTIC RADIOPHARMACEUTICAL: HCPCS | Performed by: INTERNAL MEDICINE

## 2024-01-22 PROCEDURE — 78815 PET IMAGE W/CT SKULL-THIGH: CPT

## 2024-01-22 PROCEDURE — 3430000000 HC RX DIAGNOSTIC RADIOPHARMACEUTICAL: Performed by: INTERNAL MEDICINE

## 2024-01-22 RX ORDER — AMLODIPINE BESYLATE 5 MG/1
TABLET ORAL
Qty: 90 TABLET | Refills: 3 | Status: SHIPPED | OUTPATIENT
Start: 2024-01-22

## 2024-01-22 RX ORDER — FLUDEOXYGLUCOSE F-18 500 MCI/ML
10 INJECTION INTRAVENOUS
Status: COMPLETED | OUTPATIENT
Start: 2024-01-22 | End: 2024-01-22

## 2024-01-22 RX ADMIN — FLUDEOXYGLUCOSE F-18 10 MILLICURIE: 500 INJECTION INTRAVENOUS at 10:55

## 2024-01-22 NOTE — TELEPHONE ENCOUNTER
Last Refill: 1-3-23  Last Visit: 11/20/2023   Next Visit: 11-25-24    Requested Prescriptions     Pending Prescriptions Disp Refills    amLODIPine (NORVASC) 5 MG tablet [Pharmacy Med Name: AMLODIPINE BESYLATE 5 MG Tablet] 90 tablet 3     Sig: TAKE 1 TABLET EVERY DAY

## 2024-03-21 ENCOUNTER — TRANSCRIBE ORDERS (OUTPATIENT)
Facility: HOSPITAL | Age: 84
End: 2024-03-21

## 2024-03-21 DIAGNOSIS — Z01.810 PRE-OPERATIVE CARDIOVASCULAR EXAMINATION: ICD-10-CM

## 2024-03-21 DIAGNOSIS — C50.111 MALIGNANT NEOPLASM OF CENTRAL PORTION OF RIGHT FEMALE BREAST, UNSPECIFIED ESTROGEN RECEPTOR STATUS (HCC): Primary | ICD-10-CM

## 2024-03-21 DIAGNOSIS — R11.2 NAUSEA AND VOMITING, UNSPECIFIED VOMITING TYPE: ICD-10-CM

## 2024-04-19 ENCOUNTER — HOSPITAL ENCOUNTER (OUTPATIENT)
Facility: HOSPITAL | Age: 84
End: 2024-04-19
Attending: INTERNAL MEDICINE
Payer: MEDICARE

## 2024-04-19 VITALS — BODY MASS INDEX: 34.04 KG/M2 | HEIGHT: 62 IN | WEIGHT: 185 LBS

## 2024-04-19 DIAGNOSIS — C50.111 MALIGNANT NEOPLASM OF CENTRAL PORTION OF RIGHT FEMALE BREAST, UNSPECIFIED ESTROGEN RECEPTOR STATUS (HCC): ICD-10-CM

## 2024-04-19 DIAGNOSIS — Z01.810 PRE-OPERATIVE CARDIOVASCULAR EXAMINATION: ICD-10-CM

## 2024-04-19 DIAGNOSIS — R11.2 NAUSEA AND VOMITING, UNSPECIFIED VOMITING TYPE: ICD-10-CM

## 2024-04-19 LAB
ECHO AO ROOT DIAM: 3.2 CM
ECHO AO ROOT INDEX: 1.73 CM/M2
ECHO AV AREA PEAK VELOCITY: 2.2 CM2
ECHO AV AREA VTI: 2.3 CM2
ECHO AV AREA/BSA PEAK VELOCITY: 1.2 CM2/M2
ECHO AV AREA/BSA VTI: 1.2 CM2/M2
ECHO AV MEAN GRADIENT: 6 MMHG
ECHO AV MEAN VELOCITY: 1.2 M/S
ECHO AV PEAK GRADIENT: 12 MMHG
ECHO AV PEAK VELOCITY: 1.7 M/S
ECHO AV VELOCITY RATIO: 0.71
ECHO AV VTI: 42.6 CM
ECHO BSA: 1.92 M2
ECHO LA DIAMETER INDEX: 2.27 CM/M2
ECHO LA DIAMETER: 4.2 CM
ECHO LA TO AORTIC ROOT RATIO: 1.31
ECHO LA VOL A-L A2C: 67 ML (ref 22–52)
ECHO LA VOL A-L A4C: 79 ML (ref 22–52)
ECHO LA VOL MOD A2C: 62 ML (ref 22–52)
ECHO LA VOL MOD A4C: 75 ML (ref 22–52)
ECHO LA VOLUME AREA LENGTH: 78 ML
ECHO LA VOLUME INDEX A-L A2C: 36 ML/M2 (ref 16–34)
ECHO LA VOLUME INDEX A-L A4C: 43 ML/M2 (ref 16–34)
ECHO LA VOLUME INDEX AREA LENGTH: 42 ML/M2 (ref 16–34)
ECHO LA VOLUME INDEX MOD A2C: 34 ML/M2 (ref 16–34)
ECHO LA VOLUME INDEX MOD A4C: 41 ML/M2 (ref 16–34)
ECHO LV E' LATERAL VELOCITY: 7 CM/S
ECHO LV E' SEPTAL VELOCITY: 7 CM/S
ECHO LV EDV A2C: 95 ML
ECHO LV EDV NDEX A2C: 51 ML/M2
ECHO LV EJECTION FRACTION A2C: 40 %
ECHO LV ESV A2C: 57 ML
ECHO LV ESV INDEX A2C: 31 ML/M2
ECHO LV FRACTIONAL SHORTENING: 20 % (ref 28–44)
ECHO LV INTERNAL DIMENSION DIASTOLE INDEX: 2.65 CM/M2
ECHO LV INTERNAL DIMENSION DIASTOLIC: 4.9 CM (ref 3.9–5.3)
ECHO LV INTERNAL DIMENSION SYSTOLIC INDEX: 2.11 CM/M2
ECHO LV INTERNAL DIMENSION SYSTOLIC: 3.9 CM
ECHO LV IVSD: 1.1 CM (ref 0.6–0.9)
ECHO LV MASS 2D: 200.5 G (ref 67–162)
ECHO LV MASS INDEX 2D: 108.4 G/M2 (ref 43–95)
ECHO LV POSTERIOR WALL DIASTOLIC: 1.1 CM (ref 0.6–0.9)
ECHO LV RELATIVE WALL THICKNESS RATIO: 0.45
ECHO LVOT AREA: 3.1 CM2
ECHO LVOT AV VTI INDEX: 0.74
ECHO LVOT DIAM: 2 CM
ECHO LVOT MEAN GRADIENT: 4 MMHG
ECHO LVOT PEAK GRADIENT: 6 MMHG
ECHO LVOT PEAK VELOCITY: 1.2 M/S
ECHO LVOT STROKE VOLUME INDEX: 53.8 ML/M2
ECHO LVOT SV: 99.5 ML
ECHO LVOT VTI: 31.7 CM
ECHO MV A VELOCITY: 1.05 M/S
ECHO MV E DECELERATION TIME (DT): 121.6 MS
ECHO MV E VELOCITY: 1.06 M/S
ECHO MV E/A RATIO: 1.01
ECHO MV E/E' LATERAL: 15.14
ECHO MV E/E' RATIO (AVERAGED): 15.14
ECHO RA VOLUME: 35 ML
ECHO RA VOLUME: 37 ML
ECHO RV TAPSE: 1.7 CM (ref 1.7–?)
ECHO TV REGURGITANT MAX VELOCITY: 2.95 M/S
ECHO TV REGURGITANT PEAK GRADIENT: 35 MMHG

## 2024-04-19 PROCEDURE — 93306 TTE W/DOPPLER COMPLETE: CPT

## 2024-04-22 LAB
ECHO AO ROOT DIAM: 3.2 CM
ECHO AO ROOT INDEX: 1.73 CM/M2
ECHO AV AREA PEAK VELOCITY: 2.2 CM2
ECHO AV AREA VTI: 2.3 CM2
ECHO AV AREA/BSA PEAK VELOCITY: 1.2 CM2/M2
ECHO AV AREA/BSA VTI: 1.2 CM2/M2
ECHO AV MEAN GRADIENT: 6 MMHG
ECHO AV MEAN VELOCITY: 1.2 M/S
ECHO AV PEAK GRADIENT: 12 MMHG
ECHO AV PEAK VELOCITY: 1.7 M/S
ECHO AV VELOCITY RATIO: 0.71
ECHO AV VTI: 42.6 CM
ECHO BSA: 1.92 M2
ECHO EST RA PRESSURE: 6 MMHG
ECHO LA DIAMETER INDEX: 2.27 CM/M2
ECHO LA DIAMETER: 4.2 CM
ECHO LA TO AORTIC ROOT RATIO: 1.31
ECHO LA VOL A-L A2C: 67 ML (ref 22–52)
ECHO LA VOL A-L A4C: 79 ML (ref 22–52)
ECHO LA VOL MOD A2C: 62 ML (ref 22–52)
ECHO LA VOL MOD A4C: 75 ML (ref 22–52)
ECHO LA VOLUME AREA LENGTH: 78 ML
ECHO LA VOLUME INDEX A-L A2C: 36 ML/M2 (ref 16–34)
ECHO LA VOLUME INDEX A-L A4C: 43 ML/M2 (ref 16–34)
ECHO LA VOLUME INDEX AREA LENGTH: 42 ML/M2 (ref 16–34)
ECHO LA VOLUME INDEX MOD A2C: 34 ML/M2 (ref 16–34)
ECHO LA VOLUME INDEX MOD A4C: 41 ML/M2 (ref 16–34)
ECHO LV E' LATERAL VELOCITY: 7 CM/S
ECHO LV E' SEPTAL VELOCITY: 7 CM/S
ECHO LV EDV A2C: 95 ML
ECHO LV EDV NDEX A2C: 51 ML/M2
ECHO LV EJECTION FRACTION A2C: 40 %
ECHO LV ESV A2C: 57 ML
ECHO LV ESV INDEX A2C: 31 ML/M2
ECHO LV FRACTIONAL SHORTENING: 20 % (ref 28–44)
ECHO LV INTERNAL DIMENSION DIASTOLE INDEX: 2.65 CM/M2
ECHO LV INTERNAL DIMENSION DIASTOLIC: 4.9 CM (ref 3.9–5.3)
ECHO LV INTERNAL DIMENSION SYSTOLIC INDEX: 2.11 CM/M2
ECHO LV INTERNAL DIMENSION SYSTOLIC: 3.9 CM
ECHO LV IVSD: 1.1 CM (ref 0.6–0.9)
ECHO LV MASS 2D: 200.5 G (ref 67–162)
ECHO LV MASS INDEX 2D: 108.4 G/M2 (ref 43–95)
ECHO LV POSTERIOR WALL DIASTOLIC: 1.1 CM (ref 0.6–0.9)
ECHO LV RELATIVE WALL THICKNESS RATIO: 0.45
ECHO LVOT AREA: 3.1 CM2
ECHO LVOT AV VTI INDEX: 0.74
ECHO LVOT DIAM: 2 CM
ECHO LVOT MEAN GRADIENT: 4 MMHG
ECHO LVOT PEAK GRADIENT: 6 MMHG
ECHO LVOT PEAK VELOCITY: 1.2 M/S
ECHO LVOT STROKE VOLUME INDEX: 53.8 ML/M2
ECHO LVOT SV: 99.5 ML
ECHO LVOT VTI: 31.7 CM
ECHO MV A VELOCITY: 1.05 M/S
ECHO MV E DECELERATION TIME (DT): 121.6 MS
ECHO MV E VELOCITY: 1.06 M/S
ECHO MV E/A RATIO: 1.01
ECHO MV E/E' LATERAL: 15.14
ECHO MV E/E' RATIO (AVERAGED): 15.14
ECHO RA VOLUME: 35 ML
ECHO RA VOLUME: 37 ML
ECHO RIGHT VENTRICULAR SYSTOLIC PRESSURE (RVSP): 41 MMHG
ECHO RV TAPSE: 1.7 CM (ref 1.7–?)
ECHO TV REGURGITANT MAX VELOCITY: 2.95 M/S
ECHO TV REGURGITANT PEAK GRADIENT: 35 MMHG

## 2024-10-18 ENCOUNTER — HOSPITAL ENCOUNTER (OUTPATIENT)
Facility: HOSPITAL | Age: 84
Discharge: HOME | End: 2024-10-21
Attending: INTERNAL MEDICINE
Payer: MEDICARE

## 2024-10-18 VITALS — BODY MASS INDEX: 32.91 KG/M2 | WEIGHT: 180 LBS

## 2024-10-18 DIAGNOSIS — C20 MALIGNANT NEOPLASM OF RECTUM (HCC): ICD-10-CM

## 2024-10-18 DIAGNOSIS — C50.111 MALIGNANT NEOPLASM OF CENTRAL PORTION OF RIGHT FEMALE BREAST, UNSPECIFIED ESTROGEN RECEPTOR STATUS (HCC): ICD-10-CM

## 2024-10-18 LAB
GLUCOSE BLD STRIP.AUTO-MCNC: 117 MG/DL (ref 65–117)
SERVICE CMNT-IMP: NORMAL

## 2024-10-18 PROCEDURE — 78815 PET IMAGE W/CT SKULL-THIGH: CPT

## 2024-10-18 PROCEDURE — 3430000000 HC RX DIAGNOSTIC RADIOPHARMACEUTICAL: Performed by: INTERNAL MEDICINE

## 2024-10-18 PROCEDURE — A9609 HC RX DIAGNOSTIC RADIOPHARMACEUTICAL: HCPCS | Performed by: INTERNAL MEDICINE

## 2024-10-18 PROCEDURE — 82962 GLUCOSE BLOOD TEST: CPT

## 2024-10-18 RX ORDER — FLUDEOXYGLUCOSE F-18 500 MCI/ML
10 INJECTION INTRAVENOUS
Status: COMPLETED | OUTPATIENT
Start: 2024-10-18 | End: 2024-10-18

## 2024-10-18 RX ADMIN — FLUDEOXYGLUCOSE F-18 10 MILLICURIE: 500 INJECTION INTRAVENOUS at 09:45

## 2024-10-28 ENCOUNTER — HOSPITAL ENCOUNTER (OUTPATIENT)
Facility: HOSPITAL | Age: 84
Discharge: HOME | End: 2024-10-28
Attending: SURGERY
Payer: MEDICARE

## 2024-10-28 VITALS
RESPIRATION RATE: 16 BRPM | TEMPERATURE: 97.9 F | SYSTOLIC BLOOD PRESSURE: 143 MMHG | HEART RATE: 83 BPM | DIASTOLIC BLOOD PRESSURE: 64 MMHG

## 2024-10-28 PROCEDURE — 99203 OFFICE O/P NEW LOW 30 MIN: CPT | Performed by: SURGERY

## 2024-10-28 PROCEDURE — 99212 OFFICE O/P EST SF 10 MIN: CPT

## 2024-10-28 NOTE — DISCHARGE INSTRUCTIONS
Discharge Instructions [facility]  [address]   [address], [address]  [place] [address]   Telephone: [phone]     FAX [phone]    NAME:  Beverly Wilson  YOB: 1940  MEDICAL RECORD NUMBER:  [id number]  DATE:  10/28/2024    CPT code:E&M-Level 3 (16961)    WOUND CARE ORDERS:  Right breast    Povidone- iodine betadine can be bought in the drugstore, you can soak a small piece of gauze with the betadine and place on the wound and place a bandage over it. May change more than once daily if there is a lot of drainage or as needed.     Home Health Agency: None  TREATMENT ORDERS:    Elevate leg(s) above the level of the heart when sitting.   Avoid prolonged standing in one place.  Do no get dressing/wrap wet.  Follow Diet as prescribed:   [] Diet as tolerated: [] Calorie Diabetic Diet: Low carb and no Sugar [] No Added Salt:no more then 2,000 mg daily  [] Increase Protein: [] Limit the amount of liquid you are drinking and avoid drinking in between meals (limit to 2 quarts daily)     Return Appointment:  [] Return Appointment: With Dr. Chicas in  *** Week(s)- does not need return appointment  [] Nurse Visit :   [] Ordered tests:    Electronically signed Pili Aquino RN on 10/28/2024 at 8:58 AM     Wound Care Center Information: Should you experience any significant changes in your wound(s) or have questions about your wound care, please contact the [facility] at MONDAY - FRIDAY 8:00 am - 4:30.  If you need help with your wound outside these hours and cannot wait until we are again available, contact your PCP or go to the hospital emergency room.   PLEASE NOTE: IF YOU ARE UNABLE TO OBTAIN WOUND SUPPLIES, CONTINUE TO USE THE SUPPLIES YOU HAVE AVAILABLE UNTIL YOU ARE ABLE TO REACH US. IT IS MOST IMPORTANT TO KEEP THE WOUND COVERED AT ALL TIMES.     Physician Signature:_______________________    Date: ___________ Time:  ____________

## 2024-10-28 NOTE — FLOWSHEET NOTE
10/28/24 0827   Wound 10/28/24 Breast Right   Date First Assessed/Time First Assessed: 10/28/24 0845   Present on Original Admission: Yes  Wound Approximate Age at First Assessment (Weeks): 52 weeks  Primary Wound Type: Other (comment)  Location: Breast  Wound Location Orientation: Right   Wound Image    Wound Etiology Other  (Inverted nipple, cancer)   Dressing Status Old drainage noted   Wound Cleansed Cleansed with saline   Wound Length (cm) 7.5 cm   Wound Width (cm) 5 cm   Wound Depth (cm) 0.1 cm   Wound Surface Area (cm^2) 37.5 cm^2   Wound Volume (cm^3) 3.75 cm^3   Wound Assessment [place]/red;Sl[place]   Drainage Amount Moderate (25-50%)   Drainage Description Serosanguinous   Odor None   [place]-wound Assessment Fragile   Margins Undefined edges   Wound Thickness Description not for Pressure Injury Full thickness   Pain Assessment   Pain Assessment None - Denies Pain       BP (!) 143/64   Pulse 83   Temp 97.9 °F (36.6 °C) (Temporal)   Resp 16

## 2024-10-28 NOTE — PROGRESS NOTES
Wound care    The patient is an 84-year-old woman who was referred to the wound care center by Dr. Kendra Villegas regarding a draining site on the right breast.    The patient was first seen in the wound care center on 10/28/2024.    The patient reports that she has had cancer of the right breast for 8 years.  She reportedly has metastatic disease.  She has been treated by chemotherapy with tumor response, but has recently developed a recurrent mass in the right breast associated with skin ulceration and a small amount of drainage.  The patient reports that she will be seeing Dr. Villegas again soon, but at this point the patient does not want to try an alternative chemotherapy.    The patient does not have history of diabetes mellitus.  She does not describe anginal symptoms and has no history of MI or coronary intervention.  She does not not report shortness of breath.  The patient has limited ambulation because of knee pain.  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    Past medical history also includes hypertension, hyperlipidemia.        Reported weight 180 pounds height 5 feet 2 inches    Physical examination    Vital signs blood pressure 143/64 pulse 83 temperature 97.9 respiration 16    The patient is an alert woman in no acute distress.    Head and neck examination showed no jaundice.  Lungs are clear bilaterally without rales rhonchi or wheezes.  Heart is regular without murmur gallop or rub.  The patient is alert and oriented.  She moves all extremities equally.  Facial movement is symmetrical.  Speech is normal.    Examination of the right breast revealed a mass retraction of the central breast and ulceration of the skin associated with multiple slightly erythematous nodules.          Impression:    Recurrent breast cancer with ulceration of overlying skin and likely skin implants of tumor.      Plan:    Wound care: As long as the drainage is not producing odor, I would

## 2024-11-13 RX ORDER — ATENOLOL 25 MG/1
TABLET ORAL
Qty: 180 TABLET | Refills: 3 | Status: SHIPPED | OUTPATIENT
Start: 2024-11-13

## 2024-11-13 NOTE — TELEPHONE ENCOUNTER
PCP: CRISTELA Alcocer MD    Last appt: 11/20/2023    Future Appointments   Date Time Provider Department Center   11/25/2024  9:30 AM CRISTELA Alcocer MD [facility]       Requested Prescriptions     Pending Prescriptions Disp Refills    atenolol (TENORMIN) 25 MG tablet [Pharmacy Med Name: Atenolol Oral Tablet 25 MG] 180 tablet 3     Sig: TAKE 1 TABLET TWICE DAILY

## 2024-11-25 NOTE — TELEPHONE ENCOUNTER
PCP: CRISTELA Alcocer MD    Last appt: 11/20/2023    Future Appointments   Date Time Provider Department Center   12/31/2024 10:45 AM CRISTELA Alcocer MD [facility]       Requested Prescriptions     Pending Prescriptions Disp Refills    amLODIPine (NORVASC) 5 MG tablet [Pharmacy Med Name: amLODIPine Besylate Oral Tablet 5 MG] 90 tablet 3     Sig: TAKE 1 TABLET EVERY DAY

## 2024-11-26 RX ORDER — AMLODIPINE BESYLATE 5 MG/1
TABLET ORAL
Qty: 90 TABLET | Refills: 3 | Status: SHIPPED | OUTPATIENT
Start: 2024-11-26

## 2024-12-31 ENCOUNTER — OFFICE VISIT (OUTPATIENT)
Facility: CLINIC | Age: 84
End: 2024-12-31
Payer: MEDICARE

## 2024-12-31 VITALS
WEIGHT: 183 LBS | RESPIRATION RATE: 17 BRPM | DIASTOLIC BLOOD PRESSURE: 68 MMHG | HEART RATE: 74 BPM | SYSTOLIC BLOOD PRESSURE: 134 MMHG | OXYGEN SATURATION: 98 % | BODY MASS INDEX: 33.68 KG/M2 | TEMPERATURE: 98 F | HEIGHT: 62 IN

## 2024-12-31 DIAGNOSIS — Z00.00 MEDICARE ANNUAL WELLNESS VISIT, SUBSEQUENT: ICD-10-CM

## 2024-12-31 DIAGNOSIS — C78.00 CARCINOMA OF RIGHT BREAST METASTATIC TO LUNG (HCC): ICD-10-CM

## 2024-12-31 DIAGNOSIS — C20 RECTAL CANCER (HCC): ICD-10-CM

## 2024-12-31 DIAGNOSIS — C50.911 CARCINOMA OF RIGHT BREAST METASTATIC TO LUNG (HCC): ICD-10-CM

## 2024-12-31 DIAGNOSIS — R73.02 IGT (IMPAIRED GLUCOSE TOLERANCE): ICD-10-CM

## 2024-12-31 DIAGNOSIS — H35.3231 EXUDATIVE AGE-RELATED MACULAR DEGENERATION, BILATERAL, WITH ACTIVE CHOROIDAL NEOVASCULARIZATION (HCC): ICD-10-CM

## 2024-12-31 DIAGNOSIS — M85.89 OSTEOPENIA OF MULTIPLE SITES: ICD-10-CM

## 2024-12-31 DIAGNOSIS — E55.9 VITAMIN D DEFICIENCY: ICD-10-CM

## 2024-12-31 DIAGNOSIS — I10 HTN (HYPERTENSION), BENIGN: Primary | ICD-10-CM

## 2024-12-31 DIAGNOSIS — C78.01 MALIGNANT NEOPLASM METASTATIC TO RIGHT LUNG (HCC): ICD-10-CM

## 2024-12-31 PROCEDURE — 1159F MED LIST DOCD IN RCRD: CPT | Performed by: INTERNAL MEDICINE

## 2024-12-31 PROCEDURE — 3075F SYST BP GE 130 - 139MM HG: CPT | Performed by: INTERNAL MEDICINE

## 2024-12-31 PROCEDURE — G0439 PPPS, SUBSEQ VISIT: HCPCS | Performed by: INTERNAL MEDICINE

## 2024-12-31 PROCEDURE — 1123F ACP DISCUSS/DSCN MKR DOCD: CPT | Performed by: INTERNAL MEDICINE

## 2024-12-31 PROCEDURE — 3078F DIAST BP <80 MM HG: CPT | Performed by: INTERNAL MEDICINE

## 2024-12-31 PROCEDURE — 1125F AMNT PAIN NOTED PAIN PRSNT: CPT | Performed by: INTERNAL MEDICINE

## 2024-12-31 PROCEDURE — G8484 FLU IMMUNIZE NO ADMIN: HCPCS | Performed by: INTERNAL MEDICINE

## 2024-12-31 SDOH — ECONOMIC STABILITY: INCOME INSECURITY: HOW HARD IS IT FOR YOU TO PAY FOR THE VERY BASICS LIKE FOOD, HOUSING, MEDICAL CARE, AND HEATING?: NOT HARD AT ALL

## 2024-12-31 SDOH — ECONOMIC STABILITY: FOOD INSECURITY: WITHIN THE PAST 12 MONTHS, THE FOOD YOU BOUGHT JUST DIDN'T LAST AND YOU DIDN'T HAVE MONEY TO GET MORE.: NEVER TRUE

## 2024-12-31 SDOH — ECONOMIC STABILITY: FOOD INSECURITY: WITHIN THE PAST 12 MONTHS, YOU WORRIED THAT YOUR FOOD WOULD RUN OUT BEFORE YOU GOT MONEY TO BUY MORE.: NEVER TRUE

## 2024-12-31 ASSESSMENT — PATIENT HEALTH QUESTIONNAIRE - PHQ9
SUM OF ALL RESPONSES TO PHQ QUESTIONS 1-9: 0
1. LITTLE INTEREST OR PLEASURE IN DOING THINGS: NOT AT ALL
2. FEELING DOWN, DEPRESSED OR HOPELESS: NOT AT ALL
SUM OF ALL RESPONSES TO PHQ QUESTIONS 1-9: 0
SUM OF ALL RESPONSES TO PHQ9 QUESTIONS 1 & 2: 0

## 2024-12-31 ASSESSMENT — LIFESTYLE VARIABLES
HOW MANY STANDARD DRINKS CONTAINING ALCOHOL DO YOU HAVE ON A TYPICAL DAY: PATIENT DOES NOT DRINK
HOW OFTEN DO YOU HAVE A DRINK CONTAINING ALCOHOL: NEVER

## 2024-12-31 NOTE — PROGRESS NOTES
Medicare Annual Wellness Visit    Beverly Wilson is here for Medicare AWV    Assessment & Plan   HTN (hypertension), benign  Malignant neoplasm metastatic to right lung (HCC)  Exudative age-related macular degeneration, bilateral, with active choroidal neovascularization (HCC)  Osteopenia of multiple sites  Rectal cancer (HCC)  Carcinoma of right breast metastatic to lung (HCC)  IGT (impaired glucose tolerance)  Vitamin D deficiency  Medicare annual wellness visit, subsequent    Recommendations for Preventive Services Due: see orders and patient instructions/AVS.  Recommended screening schedule for the next 5-10 years is provided to the patient in written form: see Patient Instructions/AVS.     Return in 1 year (on 12/31/2025) for Medicare AWV.     Subjective       Patient's complete Health Risk Assessment and screening values have been reviewed and are found in Flowsheets. The following problems were reviewed today and where indicated follow up appointments were made and/or referrals ordered.    Positive Risk Factor Screenings with Interventions:              Inactivity:  On average, how many days per week do you engage in moderate to strenuous exercise (like a brisk walk)?: 0 days (!) Abnormal  On average, how many minutes do you engage in exercise at this level?: 0 min  Interventions:  See AVS for additional education material     Abnormal BMI (obese):  Body mass index is 33.46 kg/m². (!) Abnormal  Interventions:  See AVS for additional education material        Dentist Screen:  Have you seen the dentist within the past year?: (!) No    Intervention:  See AVS for additional education material    Hearing Screen:  Do you or your family notice any trouble with your hearing that hasn't been managed with hearing aids?: (!) Yes    Interventions:  See AVS for additional education material      ADL's:   Patient reports needing help with:  Select all that apply: (!) Transportation  Interventions:  See AVS for additional

## 2024-12-31 NOTE — PROGRESS NOTES
Beverly Wilson is a 84 y.o. female     Chief Complaint   Patient presents with    Medicare AWV     Pt needs handicapped sticker.    /68 (Site: Left Upper Arm, Position: Sitting, Cuff Size: Large Adult)   Pulse 74   Temp 98 °F (36.7 °C) (Temporal)   Resp 17   Ht 1.575 m (5' 2.01\")   Wt 83 kg (183 lb)   SpO2 98%   BMI 33.46 kg/m²     Health Maintenance Due   Topic Date Due    Pneumococcal 65+ years Vaccine (1 of 2 - PCV) Never done    Shingles vaccine (1 of 2) Never done    Respiratory Syncytial Virus (RSV) Pregnant or age 60 yrs+ (1 - 1-dose 75+ series) Never done    Flu vaccine (1) 08/01/2024    Depression Screen  11/20/2024    Annual Wellness Visit (Medicare)  11/20/2024         \"Have you been to the ER, urgent care clinic since your last visit?  Hospitalized since your last visit?\"    NO    “Have you seen or consulted any other health care providers outside of [facility] since your last visit?”    NO

## 2024-12-31 NOTE — PATIENT INSTRUCTIONS
Learning About Being Active as an Older Adult  Why is being active important as you get older?     Being active is one of the best things you can do for your health. And it's never too late to start. Being active--or getting active, if you aren't already--has definite benefits. It can:  Give you more energy,  Keep your mind sharp.  Improve balance to reduce your risk of falls.  Help you manage chronic illness with fewer medicines.  No matter how old you are, how fit you are, or what health problems you have, there is a form of activity that will work for you. And the more physical activity you can do, the better your overall health will be.  What kinds of activity can help you stay healthy?  Being more active will make your daily activities easier. Physical activity includes planned exercise and things you do in daily life. There are four types of activity:  Aerobic.  Doing aerobic activity makes your heart and lungs strong.  Includes walking, dancing, and gardening.  Aim for at least 2½ hours spread throughout the week.  It improves your energy and can help you sleep better.  Muscle-strengthening.  This type of activity can help maintain muscle and strengthen bones.  Includes climbing stairs, using resistance bands, and lifting or carrying heavy loads.  Aim for at least twice a week.  It can help protect the knees and other joints.  Stretching.  Stretching gives you better range of motion in joints and muscles.  Includes upper arm stretches, calf stretches, and gentle yoga.  Aim for at least twice a week, preferably after your muscles are warmed up from other activities.  It can help you function better in daily life.  Balancing.  This helps you stay coordinated and have good posture.  Includes heel-to-toe walking, soila chi, and certain types of yoga.  Aim for at least 3 days a week.  It can reduce your risk of falling.  Even if you have a hard time meeting the recommendations, it's better to be more active

## 2025-03-20 ENCOUNTER — OFFICE VISIT (OUTPATIENT)
Facility: CLINIC | Age: 85
End: 2025-03-20

## 2025-03-20 VITALS
BODY MASS INDEX: 31.94 KG/M2 | WEIGHT: 173.6 LBS | HEIGHT: 62 IN | OXYGEN SATURATION: 97 % | SYSTOLIC BLOOD PRESSURE: 146 MMHG | HEART RATE: 76 BPM | TEMPERATURE: 97.9 F | DIASTOLIC BLOOD PRESSURE: 84 MMHG | RESPIRATION RATE: 18 BRPM

## 2025-03-20 DIAGNOSIS — M25.551 RIGHT HIP PAIN: Primary | ICD-10-CM

## 2025-03-20 RX ORDER — PREDNISONE 20 MG/1
20 TABLET ORAL DAILY
Qty: 5 TABLET | Refills: 0 | Status: SHIPPED | OUTPATIENT
Start: 2025-03-20 | End: 2025-03-25

## 2025-03-20 SDOH — ECONOMIC STABILITY: FOOD INSECURITY: WITHIN THE PAST 12 MONTHS, YOU WORRIED THAT YOUR FOOD WOULD RUN OUT BEFORE YOU GOT MONEY TO BUY MORE.: NEVER TRUE

## 2025-03-20 SDOH — ECONOMIC STABILITY: FOOD INSECURITY: WITHIN THE PAST 12 MONTHS, THE FOOD YOU BOUGHT JUST DIDN'T LAST AND YOU DIDN'T HAVE MONEY TO GET MORE.: NEVER TRUE

## 2025-03-20 ASSESSMENT — PATIENT HEALTH QUESTIONNAIRE - PHQ9
SUM OF ALL RESPONSES TO PHQ QUESTIONS 1-9: 0
2. FEELING DOWN, DEPRESSED OR HOPELESS: NOT AT ALL
1. LITTLE INTEREST OR PLEASURE IN DOING THINGS: NOT AT ALL
SUM OF ALL RESPONSES TO PHQ QUESTIONS 1-9: 0

## 2025-03-20 NOTE — PROGRESS NOTES
Beverly Wilson is a 84 y.o. female     Chief Complaint   Patient presents with    R leg injury     Pt fell 2/15/25; on her butt and R leg.     Pt says her granddaughter in law who she says is an NP, stated pt needs an xray and/or CT.    BP (!) 146/84 (BP Site: Right Upper Arm, Patient Position: Sitting, BP Cuff Size: Large Adult)   Pulse 76   Temp 97.9 °F (36.6 °C) (Temporal)   Resp 18   Ht 1.575 m (5' 2.01\")   Wt 78.7 kg (173 lb 9.6 oz)   SpO2 97%   BMI 31.74 kg/m²     Health Maintenance Due   Topic Date Due    Shingles vaccine (1 of 2) Never done    Pneumococcal 50+ years Vaccine (1 of 2 - PCV) Never done    Respiratory Syncytial Virus (RSV) Pregnant or age 60 yrs+ (1 - 1-dose 75+ series) Never done    Flu vaccine (1) 08/01/2024         \"Have you been to the ER, urgent care clinic since your last visit?  Hospitalized since your last visit?\"    NO    “Have you seen or consulted any other health care providers outside of [facility] since your last visit?”    NO

## 2025-03-20 NOTE — PROGRESS NOTES
Beverly Wilson is a 84 y.o. female and presents with R leg injury  .    Subjective:    Mrs. Follow-up presents today with complaint of right leg pain.  She states that she fell and started to develop discomfort that radiates into her right groin and thigh.  This has been persistent over the past month.  The pain is exacerbated with weightbearing.  She denies any back pain.  She denies any weakness of her right lower extremity but has difficulty extending and or lifting her right leg.  Past Medical History:   Diagnosis Date    Arthritis     knees, hands    Breast cancer, right (HCC) Dx 4/12/16    Dr Kendra Villegas    Exudative age-related macular degeneration of both eyes with active choroidal neovascularization (HCC) 01/29/2021    HTN (hypertension), benign 09/22/2017    Hypertension     Macular degeneration     unable to drive, limited reading    Motion sickness     Nausea & vomiting     Osteopenia of multiple sites 01/29/2021    Postherpetic neuralgia     right temple and scalp    PUD (peptic ulcer disease)     Pure hypercholesterolemia 01/29/2021     Past Surgical History:   Procedure Laterality Date    CATARACT REMOVAL Bilateral 1997 2005    COLONOSCOPY N/A 03/03/2023    COLONOSCOPY WITH BIOPSY AND POLYPECTOMY performed by Ivan Trinidad Jr., MD at [facility] ENDOSCOPY    ORTHOPEDIC SURGERY Left 01/01/2008    part knee replacement    UPPER GI ENDOSCOPY,BIOPSY  06/20/2016         UPPER GI ENDOSCOPY,CTRL BLEED  06/20/2016         VASCULAR SURGERY Left 2016    [place]     Allergies   Allergen Reactions    Menthol Swelling     Mouth swelling, had to go to ER    Bee Venom Swelling    Sulfa Antibiotics Hives     Current Outpatient Medications   Medication Sig Dispense Refill    predniSONE (DELTASONE) 20 MG tablet Take 1 tablet by mouth daily for 5 days 5 tablet 0    Ado-Trastuzumab Emtansine (KADCYLA IV) Infuse intravenously      amLODIPine (NORVASC) 5 MG tablet TAKE 1 TABLET EVERY DAY 90 tablet 3    atenolol

## 2025-04-24 ENCOUNTER — TRANSCRIBE ORDERS (OUTPATIENT)
Facility: HOSPITAL | Age: 85
End: 2025-04-24

## 2025-04-24 DIAGNOSIS — C50.111 MALIGNANT NEOPLASM OF CENTRAL PORTION OF RIGHT FEMALE BREAST, UNSPECIFIED ESTROGEN RECEPTOR STATUS (HCC): Primary | ICD-10-CM

## 2025-04-24 DIAGNOSIS — C20 MALIGNANT NEOPLASM OF RECTUM (HCC): ICD-10-CM

## 2025-04-24 DIAGNOSIS — Z01.810 ENCOUNTER FOR PREPROCEDURAL CARDIOVASCULAR EXAMINATION: ICD-10-CM

## 2025-04-24 DIAGNOSIS — T45.1X5A ANEMIA DUE TO ANTINEOPLASTIC CHEMOTHERAPY: ICD-10-CM

## 2025-04-24 DIAGNOSIS — Z08 ENCNTR FOR FOLLOW-UP EXAM AFTER TRTMT FOR MALIGNANT NEOPLASM: ICD-10-CM

## 2025-04-24 DIAGNOSIS — D64.81 ANEMIA DUE TO ANTINEOPLASTIC CHEMOTHERAPY: ICD-10-CM

## 2025-04-24 DIAGNOSIS — E61.1 IRON DEFICIENCY: ICD-10-CM

## 2025-04-24 DIAGNOSIS — R19.7 DIARRHEA, UNSPECIFIED TYPE: ICD-10-CM

## 2025-04-24 DIAGNOSIS — Z79.899 OTHER LONG TERM (CURRENT) DRUG THERAPY: ICD-10-CM

## 2025-05-08 ENCOUNTER — HOSPITAL ENCOUNTER (OUTPATIENT)
Facility: HOSPITAL | Age: 85
Discharge: HOME | End: 2025-05-11
Attending: INTERNAL MEDICINE
Payer: MEDICARE

## 2025-05-08 VITALS — WEIGHT: 171 LBS | BODY MASS INDEX: 31.27 KG/M2

## 2025-05-08 DIAGNOSIS — Z01.810 ENCOUNTER FOR PREPROCEDURAL CARDIOVASCULAR EXAMINATION: ICD-10-CM

## 2025-05-08 DIAGNOSIS — C20 MALIGNANT NEOPLASM OF RECTUM (HCC): ICD-10-CM

## 2025-05-08 DIAGNOSIS — Z79.899 OTHER LONG TERM (CURRENT) DRUG THERAPY: ICD-10-CM

## 2025-05-08 DIAGNOSIS — C50.111 MALIGNANT NEOPLASM OF CENTRAL PORTION OF RIGHT FEMALE BREAST, UNSPECIFIED ESTROGEN RECEPTOR STATUS (HCC): ICD-10-CM

## 2025-05-08 DIAGNOSIS — E61.1 IRON DEFICIENCY: ICD-10-CM

## 2025-05-08 DIAGNOSIS — R19.7 DIARRHEA, UNSPECIFIED TYPE: ICD-10-CM

## 2025-05-08 DIAGNOSIS — T45.1X5A ANEMIA DUE TO ANTINEOPLASTIC CHEMOTHERAPY: ICD-10-CM

## 2025-05-08 DIAGNOSIS — D64.81 ANEMIA DUE TO ANTINEOPLASTIC CHEMOTHERAPY: ICD-10-CM

## 2025-05-08 DIAGNOSIS — Z08 ENCNTR FOR FOLLOW-UP EXAM AFTER TRTMT FOR MALIGNANT NEOPLASM: ICD-10-CM

## 2025-05-08 LAB
GLUCOSE BLD STRIP.AUTO-MCNC: 113 MG/DL (ref 65–117)
SERVICE CMNT-IMP: NORMAL

## 2025-05-08 PROCEDURE — 82962 GLUCOSE BLOOD TEST: CPT

## 2025-05-08 PROCEDURE — A9609 HC RX DIAGNOSTIC RADIOPHARMACEUTICAL: HCPCS | Performed by: INTERNAL MEDICINE

## 2025-05-08 PROCEDURE — 78815 PET IMAGE W/CT SKULL-THIGH: CPT

## 2025-05-08 PROCEDURE — 3430000000 HC RX DIAGNOSTIC RADIOPHARMACEUTICAL: Performed by: INTERNAL MEDICINE

## 2025-05-08 RX ORDER — FLUDEOXYGLUCOSE F-18 500 MCI/ML
10 INJECTION INTRAVENOUS
Status: COMPLETED | OUTPATIENT
Start: 2025-05-08 | End: 2025-05-08

## 2025-05-08 RX ADMIN — FLUDEOXYGLUCOSE F-18 10 MILLICURIE: 500 INJECTION INTRAVENOUS at 14:30

## 2025-06-12 ENCOUNTER — OFFICE VISIT (OUTPATIENT)
Facility: CLINIC | Age: 85
End: 2025-06-12

## 2025-06-12 VITALS
HEART RATE: 92 BPM | HEIGHT: 62 IN | WEIGHT: 174 LBS | RESPIRATION RATE: 18 BRPM | SYSTOLIC BLOOD PRESSURE: 150 MMHG | OXYGEN SATURATION: 97 % | DIASTOLIC BLOOD PRESSURE: 72 MMHG | BODY MASS INDEX: 32.02 KG/M2 | TEMPERATURE: 99.7 F

## 2025-06-12 DIAGNOSIS — R30.0 DYSURIA: Primary | ICD-10-CM

## 2025-06-12 DIAGNOSIS — H35.3231 EXUDATIVE AGE-RELATED MACULAR DEGENERATION, BILATERAL, WITH ACTIVE CHOROIDAL NEOVASCULARIZATION (HCC): ICD-10-CM

## 2025-06-12 DIAGNOSIS — C50.911 CARCINOMA OF RIGHT BREAST METASTATIC TO LUNG (HCC): ICD-10-CM

## 2025-06-12 DIAGNOSIS — C78.00 CARCINOMA OF RIGHT BREAST METASTATIC TO LUNG (HCC): ICD-10-CM

## 2025-06-12 RX ORDER — CIPROFLOXACIN 250 MG/1
250 TABLET, FILM COATED ORAL 2 TIMES DAILY
Qty: 14 TABLET | Refills: 0 | Status: SHIPPED | OUTPATIENT
Start: 2025-06-12 | End: 2025-06-19

## 2025-06-12 NOTE — PROGRESS NOTES
Beverly Wilson is a 84 y.o. female     Chief Complaint   Patient presents with    Urinary Tract Infection       BP (!) 150/72 (BP Site: Left Upper Arm, Patient Position: Sitting, BP Cuff Size: Large Adult)   Pulse 92   Temp 99.7 °F (37.6 °C) (Temporal)   Resp 18   Ht 1.575 m (5' 2.01\")   Wt 78.9 kg (174 lb)   SpO2 97%   BMI 31.81 kg/m²     Health Maintenance Due   Topic Date Due    Shingles vaccine (1 of 2) Never done    Pneumococcal 50+ years Vaccine (1 of 2 - PCV) Never done    Respiratory Syncytial Virus (RSV) Pregnant or age 60 yrs+ (1 - 1-dose 75+ series) Never done         \"Have you been to the ER, urgent care clinic since your last visit?  Hospitalized since your last visit?\"    NO    “Have you seen or consulted any other health care providers outside of [facility] since your last visit?”    NO                   
TABLET EVERY DAY 90 tablet 3    atenolol (TENORMIN) 25 MG tablet TAKE 1 TABLET TWICE DAILY 180 tablet 3    omeprazole (PRILOSEC) 40 MG delayed release capsule Take 1 capsule by mouth 2 times daily 60 capsule 3    Multiple Vitamins-Iron (MULTIVITAMIN PLUS IRON ADULT PO) Take by mouth daily      Multiple Vitamins-Minerals (PRESERVISION AREDS 2 PO) Take by mouth daily      vitamin D 25 MCG (1000 UT) CAPS Take by mouth daily       No current facility-administered medications for this visit.     Social History     Socioeconomic History    Marital status:    Tobacco Use    Smoking status: Never    Smokeless tobacco: Never   Vaping Use    Vaping status: Never Used   Substance and Sexual Activity    Alcohol use: Not Currently    Drug use: Not Currently    Sexual activity: Not Currently     Partners: Male     Social Drivers of Health     Financial Resource Strain: Low Risk  (12/31/2024)    Overall Financial Resource Strain (CARDIA)     Difficulty of Paying Living Expenses: Not hard at all   Food Insecurity: No Food Insecurity (3/20/2025)    Hunger Vital Sign     Worried About Running Out of Food in the Last Year: Never true     Ran Out of Food in the Last Year: Never true   Transportation Needs: No Transportation Needs (3/20/2025)    PRAPARE - Transportation     Lack of Transportation (Medical): No     Lack of Transportation (Non-Medical): No   Physical Activity: Inactive (12/31/2024)    Exercise Vital Sign     Days of Exercise per Week: 0 days     Minutes of Exercise per Session: 0 min   Housing Stability: Low Risk  (3/20/2025)    Housing Stability Vital Sign     Unable to Pay for Housing in the Last Year: No     Number of Times Moved in the Last Year: 0     Homeless in the Last Year: No     Family History   Problem Relation Age of Onset    Heart Disease Mother     Hypertension Mother        Review of Systems  Constitutional:  negative for fevers, chills, anorexia and weight loss  Eyes:    negative for visual

## 2025-06-13 LAB
APPEARANCE UR: ABNORMAL
BACTERIA URNS QL MICRO: ABNORMAL /HPF
BILIRUB UR QL: NEGATIVE
COLOR UR: ABNORMAL
EPITH CASTS URNS QL MICRO: ABNORMAL /LPF
GLUCOSE UR STRIP.AUTO-MCNC: NEGATIVE MG/DL
HGB UR QL STRIP: NEGATIVE
HYALINE CASTS URNS QL MICRO: ABNORMAL /LPF (ref 0–5)
KETONES UR QL STRIP.AUTO: NEGATIVE MG/DL
LEUKOCYTE ESTERASE UR QL STRIP.AUTO: ABNORMAL
NITRITE UR QL STRIP.AUTO: NEGATIVE
PH UR STRIP: 5.5 (ref 5–8)
PROT UR STRIP-MCNC: ABNORMAL MG/DL
RBC #/AREA URNS HPF: ABNORMAL /HPF (ref 0–5)
SP GR UR REFRACTOMETRY: 1.01 (ref 1–1.03)
URINE CULTURE IF INDICATED: ABNORMAL
UROBILINOGEN UR QL STRIP.AUTO: 0.2 EU/DL (ref 0.2–1)
WBC URNS QL MICRO: >100 /HPF (ref 0–4)

## 2025-06-15 LAB
BACTERIA SPEC CULT: ABNORMAL
BACTERIA SPEC CULT: ABNORMAL
CC UR VC: ABNORMAL
SERVICE CMNT-IMP: ABNORMAL

## 2025-06-16 ENCOUNTER — RESULTS FOLLOW-UP (OUTPATIENT)
Facility: CLINIC | Age: 85
End: 2025-06-16

## 2025-07-11 ENCOUNTER — TELEPHONE (OUTPATIENT)
Facility: CLINIC | Age: 85
End: 2025-07-11

## 2025-07-11 DIAGNOSIS — R39.89 SUSPECTED UTI: ICD-10-CM

## 2025-07-11 DIAGNOSIS — R39.89 SUSPECTED UTI: Primary | ICD-10-CM

## 2025-07-11 DIAGNOSIS — R30.0 DYSURIA: Primary | ICD-10-CM

## 2025-07-11 RX ORDER — CIPROFLOXACIN 500 MG/1
500 TABLET, FILM COATED ORAL 2 TIMES DAILY
Qty: 14 TABLET | Refills: 0 | Status: SHIPPED | OUTPATIENT
Start: 2025-07-11 | End: 2025-07-18

## 2025-07-11 NOTE — TELEPHONE ENCOUNTER
Called and s/w patient, per pt, Dr. Alcocer told her he'll just call it in if she calls back with more issues. She says she's been here for 40+ years and Dr. Alcocer and Paco (PSR) knows her really well. I did state to patient its been almost a month since her last UA and that you'd like to retest her before sending abx because there may be something else wrong. She insist on not coming and you sending it in for her.

## 2025-07-11 NOTE — TELEPHONE ENCOUNTER
Patient would like a refill of Cipro. She states she has another UTI and says it can be sent to [facility] on [address]

## 2025-07-12 LAB
APPEARANCE UR: ABNORMAL
BACTERIA URNS QL MICRO: ABNORMAL /HPF
BILIRUB UR QL: NEGATIVE
COLOR UR: ABNORMAL
EPITH CASTS URNS QL MICRO: ABNORMAL /LPF
GLUCOSE UR STRIP.AUTO-MCNC: NEGATIVE MG/DL
HGB UR QL STRIP: NEGATIVE
KETONES UR QL STRIP.AUTO: ABNORMAL MG/DL
LEUKOCYTE ESTERASE UR QL STRIP.AUTO: ABNORMAL
NITRITE UR QL STRIP.AUTO: NEGATIVE
PH UR STRIP: 5.5 (ref 5–8)
PROT UR STRIP-MCNC: 100 MG/DL
RBC #/AREA URNS HPF: ABNORMAL /HPF (ref 0–5)
SP GR UR REFRACTOMETRY: 1.02 (ref 1–1.03)
URINE CULTURE IF INDICATED: ABNORMAL
UROBILINOGEN UR QL STRIP.AUTO: 0.2 EU/DL (ref 0.2–1)
WBC URNS QL MICRO: >100 /HPF (ref 0–4)

## 2025-07-14 LAB
BACTERIA SPEC CULT: ABNORMAL
CC UR VC: ABNORMAL
SERVICE CMNT-IMP: ABNORMAL

## (undated) DEVICE — 1200 GUARD II KIT W/5MM TUBE W/O VAC TUBE: Brand: GUARDIAN

## (undated) DEVICE — GRASPER ENDOSCP TIP L10MM ANVIL ROT RATCH HNDL DISP

## (undated) DEVICE — STAPLER SHEATH: Brand: ENDOWRIST

## (undated) DEVICE — STAPLER 45 RELOAD: Brand: ENDOWRIST

## (undated) DEVICE — CANISTER, RIGID, 3000CC: Brand: MEDLINE INDUSTRIES, INC.

## (undated) DEVICE — SOLUTION IRRIG 3000ML 0.9% SOD CHL USP UROMATIC PLAS CONT

## (undated) DEVICE — SUTURE SZ 0 27IN 5/8 CIR UR-6  TAPER PT VIOLET ABSRB VICRYL J603H

## (undated) DEVICE — INSUFFLATION NEEDLE: Brand: SURGINEEDLE

## (undated) DEVICE — Device

## (undated) DEVICE — VESSEL SEALER: Brand: ENDOWRIST

## (undated) DEVICE — GENERAL LAPAROSCOPY-MRMC: Brand: MEDLINE INDUSTRIES, INC.

## (undated) DEVICE — 40580 - THE PINK PAD - ADVANCED TRENDELENBURG POSITIONING KIT: Brand: 40580 - THE PINK PAD - ADVANCED TRENDELENBURG POSITIONING KIT

## (undated) DEVICE — SUTURE MCRYL SZ 4-0 L27IN ABSRB UD L19MM PS-2 1/2 CIR PRIM Y426H

## (undated) DEVICE — CYSTO MRMC: Brand: MEDLINE INDUSTRIES, INC.

## (undated) DEVICE — SYR 3ML LL TIP 1/10ML GRAD --

## (undated) DEVICE — HYPODERMIC SAFETY NEEDLE: Brand: MAGELLAN

## (undated) DEVICE — CATH IV AUTOGRD BC PNK 20GA 25 -- INSYTE

## (undated) DEVICE — TUBING, SUCTION, 1/4" X 10', STRAIGHT: Brand: MEDLINE

## (undated) DEVICE — GLOVE SURG SZ 75 L12IN FNGR THK79MIL GRN LTX FREE

## (undated) DEVICE — SOLUTION IRRIGATION H2O 0797305] ICU MEDICAL INC]

## (undated) DEVICE — Z DISCONTINUED PER MEDLINE LINE GAS SAMPLING O2/CO2 LNG AD 13 FT NSL W/ TBNG FILTERLINE

## (undated) DEVICE — LEGGINGS: Brand: CONVERTORS

## (undated) DEVICE — SUTURE PDS II SZ 0 L60IN ABSRB VLT L48MM CTX 1/2 CIR Z990G

## (undated) DEVICE — ELECTRODE,RADIOTRANSLUCENT,FOAM,5PK: Brand: MEDLINE

## (undated) DEVICE — SPONGE LAPAROTOMY W18XL18IN WHITE STRUNG RADIOPAQUE STERILE

## (undated) DEVICE — TOTAL TRAY, 16FR 10ML SIL FOLEY, URN: Brand: MEDLINE

## (undated) DEVICE — DRAPE,ROBOTICS,STERILE: Brand: MEDLINE

## (undated) DEVICE — SUTURE PROL SZ 2-0 L36IN NONABSORBABLE BLU SH L26MM 1/2 CIR 8523H

## (undated) DEVICE — ARM DRAPE

## (undated) DEVICE — MARKER,SKIN,WI/RULER AND LABELS: Brand: MEDLINE

## (undated) DEVICE — BASIN EMSIS 16OZ GRAPHITE PLAS KID SHP MOLD GRAD FOR ORAL

## (undated) DEVICE — OBTRTR BLDELSS OPT 8MM DISP -- DA VINICI XI - SNGL USE

## (undated) DEVICE — FCPS RAD JAW 4LC 240CM W/NDL -- BX/40

## (undated) DEVICE — JELLY,LUBE,STERILE,FLIP TOP,TUBE,2-OZ: Brand: MEDLINE

## (undated) DEVICE — SUTURE VCRL SZ 3-0 L27IN ABSRB UD L26MM SH 1/2 CIR J416H

## (undated) DEVICE — GLOVE ORANGE PI 7   MSG9070

## (undated) DEVICE — STAPLER INT DIA29MM CLS STPL H1.5-2.2MM OPN LEG L5.2MM 26

## (undated) DEVICE — SOLUTION IRRIG 1000ML STRL H2O USP PLAS POUR BTL

## (undated) DEVICE — SEAL

## (undated) DEVICE — SET ADMIN 16ML TBNG L100IN 2 Y INJ SITE IV PIGGY BK DISP (ORDER IN MULIPLES OF 48)

## (undated) DEVICE — NEONATAL-ADULT SPO2 SENSOR: Brand: NELLCOR

## (undated) DEVICE — VISUALIZATION SYSTEM: Brand: CLEARIFY

## (undated) DEVICE — CONTAINER SPEC 20 ML LID NEUT BUFF FORMALIN 10 % POLYPR STS

## (undated) DEVICE — OPEN-END URETERAL CATHETER: Brand: COOK

## (undated) DEVICE — SYR 10ML LUER LOK 1/5ML GRAD --

## (undated) DEVICE — SEAL UNIV 5-8MM DISP BX/10 -- DA VINCI XI - SNGL USE

## (undated) DEVICE — 3M™ IOBAN™ 2 ANTIMICROBIAL INCISE DRAPE 6650EZ: Brand: IOBAN™ 2

## (undated) DEVICE — TOWEL 4 PLY TISS 19X30 SUE WHT

## (undated) DEVICE — BLADE SCALP SURG SAFLOK 15 --